# Patient Record
Sex: MALE | Race: WHITE | Employment: OTHER | URBAN - METROPOLITAN AREA
[De-identification: names, ages, dates, MRNs, and addresses within clinical notes are randomized per-mention and may not be internally consistent; named-entity substitution may affect disease eponyms.]

---

## 2021-02-03 ENCOUNTER — HOSPITAL ENCOUNTER (EMERGENCY)
Age: 71
Discharge: HOME | End: 2021-02-03
Payer: MEDICARE

## 2021-02-03 ENCOUNTER — APPOINTMENT (OUTPATIENT)
Dept: GENERAL RADIOLOGY | Age: 71
End: 2021-02-03
Payer: MEDICARE

## 2021-02-03 VITALS
DIASTOLIC BLOOD PRESSURE: 113 MMHG | RESPIRATION RATE: 13 BRPM | SYSTOLIC BLOOD PRESSURE: 179 MMHG | WEIGHT: 190 LBS | OXYGEN SATURATION: 97 % | BODY MASS INDEX: 28.79 KG/M2 | TEMPERATURE: 98.6 F | HEIGHT: 68 IN | HEART RATE: 100 BPM

## 2021-02-03 DIAGNOSIS — R51.9 ACUTE NONINTRACTABLE HEADACHE, UNSPECIFIED HEADACHE TYPE: ICD-10-CM

## 2021-02-03 DIAGNOSIS — M79.10 MYALGIA: ICD-10-CM

## 2021-02-03 DIAGNOSIS — Z20.822 PERSON UNDER INVESTIGATION FOR COVID-19: Primary | ICD-10-CM

## 2021-02-03 LAB
RAPID INFLUENZA  B AGN: NEGATIVE
RAPID INFLUENZA A AGN: NEGATIVE

## 2021-02-03 PROCEDURE — U0002 COVID-19 LAB TEST NON-CDC: HCPCS

## 2021-02-03 PROCEDURE — 6370000000 HC RX 637 (ALT 250 FOR IP): Performed by: PHYSICIAN ASSISTANT

## 2021-02-03 PROCEDURE — 87804 INFLUENZA ASSAY W/OPTIC: CPT

## 2021-02-03 PROCEDURE — 71045 X-RAY EXAM CHEST 1 VIEW: CPT

## 2021-02-03 PROCEDURE — 99283 EMERGENCY DEPT VISIT LOW MDM: CPT

## 2021-02-03 PROCEDURE — U0003 INFECTIOUS AGENT DETECTION BY NUCLEIC ACID (DNA OR RNA); SEVERE ACUTE RESPIRATORY SYNDROME CORONAVIRUS 2 (SARS-COV-2) (CORONAVIRUS DISEASE [COVID-19]), AMPLIFIED PROBE TECHNIQUE, MAKING USE OF HIGH THROUGHPUT TECHNOLOGIES AS DESCRIBED BY CMS-2020-01-R: HCPCS

## 2021-02-03 RX ORDER — ACETAMINOPHEN 500 MG
1000 TABLET ORAL ONCE
Status: COMPLETED | OUTPATIENT
Start: 2021-02-03 | End: 2021-02-03

## 2021-02-03 RX ORDER — LANOLIN ALCOHOL/MO/W.PET/CERES
3 CREAM (GRAM) TOPICAL DAILY
COMMUNITY

## 2021-02-03 RX ORDER — HALOPERIDOL 5 MG
5 TABLET ORAL 2 TIMES DAILY
COMMUNITY

## 2021-02-03 RX ADMIN — ACETAMINOPHEN 1000 MG: 500 TABLET ORAL at 16:50

## 2021-02-03 ASSESSMENT — PAIN SCALES - GENERAL: PAINLEVEL_OUTOF10: 1

## 2021-02-03 ASSESSMENT — ENCOUNTER SYMPTOMS
RESPIRATORY NEGATIVE: 1
DIARRHEA: 0
CHEST TIGHTNESS: 0
NAUSEA: 0
ABDOMINAL PAIN: 0
BACK PAIN: 0
SHORTNESS OF BREATH: 0
VOMITING: 0
COUGH: 0
COLOR CHANGE: 0
CONSTIPATION: 0

## 2021-02-04 ENCOUNTER — CARE COORDINATION (OUTPATIENT)
Dept: CARE COORDINATION | Age: 71
End: 2021-02-04

## 2021-02-04 LAB — SARS-COV-2, PCR: NOT DETECTED

## 2021-02-04 NOTE — CARE COORDINATION
exposure line [phone], local [facility] department [place] [facility]: ([phone]) and PCP office for questions related to their healthcare. CTN/ACM provided contact information for future needs. Reviewed and educated patient on any new and changed medications related to discharge diagnosis     Patient/family/caregiver given information for [employer] and agrees to enroll no    Plan for follow-up call in 5-7 days based on severity of symptoms and risk factors.     COVID Education sent to pt to review via [employer]

## 2021-02-10 ENCOUNTER — CARE COORDINATION (OUTPATIENT)
Dept: CARE COORDINATION | Age: 71
End: 2021-02-10

## 2024-08-31 ENCOUNTER — HOSPITAL ENCOUNTER (INPATIENT)
Age: 74
LOS: 4 days | Discharge: HOME HEALTH | DRG: 698 | End: 2024-09-04
Attending: STUDENT IN AN ORGANIZED HEALTH CARE EDUCATION/TRAINING PROGRAM | Admitting: INTERNAL MEDICINE
Payer: MEDICARE

## 2024-08-31 ENCOUNTER — APPOINTMENT (OUTPATIENT)
Dept: CT IMAGING | Age: 74
DRG: 698 | End: 2024-08-31
Payer: MEDICARE

## 2024-08-31 DIAGNOSIS — N30.01 ACUTE CYSTITIS WITH HEMATURIA: ICD-10-CM

## 2024-08-31 DIAGNOSIS — N32.0 BLADDER OUTLET OBSTRUCTION: ICD-10-CM

## 2024-08-31 DIAGNOSIS — N17.9 ACUTE RENAL FAILURE, UNSPECIFIED ACUTE RENAL FAILURE TYPE (HCC): Primary | ICD-10-CM

## 2024-08-31 DIAGNOSIS — E87.1 HYPONATREMIA: ICD-10-CM

## 2024-08-31 PROBLEM — N39.0 UTI (URINARY TRACT INFECTION): Status: ACTIVE | Noted: 2024-08-31

## 2024-08-31 PROBLEM — R31.9 HEMATURIA: Status: ACTIVE | Noted: 2024-08-31

## 2024-08-31 PROBLEM — A41.9 SEPSIS (HCC): Status: ACTIVE | Noted: 2024-08-31

## 2024-08-31 LAB
ALBUMIN SERPL-MCNC: 3.9 G/DL (ref 3.4–5)
ALBUMIN/GLOB SERPL: 1.1 {RATIO} (ref 1.1–2.2)
ALP SERPL-CCNC: 96 U/L (ref 40–129)
ALT SERPL-CCNC: 17 U/L (ref 10–40)
ANION GAP SERPL CALCULATED.3IONS-SCNC: 17 MMOL/L (ref 3–16)
AST SERPL-CCNC: 16 U/L (ref 15–37)
BACTERIA URNS QL MICRO: ABNORMAL /HPF
BASOPHILS # BLD: 0 K/UL (ref 0–0.2)
BASOPHILS NFR BLD: 0 %
BILIRUB SERPL-MCNC: 1.4 MG/DL (ref 0–1)
BILIRUB UR QL STRIP.AUTO: NEGATIVE
BUN SERPL-MCNC: 70 MG/DL (ref 7–20)
CALCIUM SERPL-MCNC: 8.7 MG/DL (ref 8.3–10.6)
CHLORIDE SERPL-SCNC: 97 MMOL/L (ref 99–110)
CLARITY UR: ABNORMAL
CO2 SERPL-SCNC: 15 MMOL/L (ref 21–32)
COLOR UR: YELLOW
CREAT SERPL-MCNC: 5 MG/DL (ref 0.8–1.3)
DEPRECATED RDW RBC AUTO: 15.7 % (ref 12.4–15.4)
EOSINOPHIL # BLD: 0 K/UL (ref 0–0.6)
EOSINOPHIL NFR BLD: 0 %
EPI CELLS #/AREA URNS AUTO: 0 /HPF (ref 0–5)
GFR SERPLBLD CREATININE-BSD FMLA CKD-EPI: 11 ML/MIN/{1.73_M2}
GLUCOSE SERPL-MCNC: 160 MG/DL (ref 70–99)
GLUCOSE UR STRIP.AUTO-MCNC: NEGATIVE MG/DL
HCT VFR BLD AUTO: 44.6 % (ref 40.5–52.5)
HGB BLD-MCNC: 14.3 G/DL (ref 13.5–17.5)
HGB UR QL STRIP.AUTO: ABNORMAL
HYALINE CASTS #/AREA URNS AUTO: 1 /LPF (ref 0–8)
KETONES UR STRIP.AUTO-MCNC: NEGATIVE MG/DL
LEUKOCYTE ESTERASE UR QL STRIP.AUTO: ABNORMAL
LIPASE SERPL-CCNC: 21 U/L (ref 13–60)
LYMPHOCYTES # BLD: 1.5 K/UL (ref 1–5.1)
LYMPHOCYTES NFR BLD: 8 %
MCH RBC QN AUTO: 27.8 PG (ref 26–34)
MCHC RBC AUTO-ENTMCNC: 32.1 G/DL (ref 31–36)
MCV RBC AUTO: 86.6 FL (ref 80–100)
METAMYELOCYTES NFR BLD MANUAL: 1 %
MONOCYTES # BLD: 1.7 K/UL (ref 0–1.3)
MONOCYTES NFR BLD: 9 %
NEUTROPHILS # BLD: 15.3 K/UL (ref 1.7–7.7)
NEUTROPHILS NFR BLD: 68 %
NEUTS BAND NFR BLD MANUAL: 14 % (ref 0–7)
NITRITE UR QL STRIP.AUTO: POSITIVE
PH UR STRIP.AUTO: 6 [PH] (ref 5–8)
PLATELET # BLD AUTO: 235 K/UL (ref 135–450)
PMV BLD AUTO: 7.3 FL (ref 5–10.5)
POTASSIUM SERPL-SCNC: 4.5 MMOL/L (ref 3.5–5.1)
PROT SERPL-MCNC: 7.6 G/DL (ref 6.4–8.2)
PROT UR STRIP.AUTO-MCNC: 100 MG/DL
RBC # BLD AUTO: 5.15 M/UL (ref 4.2–5.9)
RBC CLUMPS #/AREA URNS AUTO: 108 /HPF (ref 0–4)
SODIUM SERPL-SCNC: 129 MMOL/L (ref 136–145)
SP GR UR STRIP.AUTO: 1.01 (ref 1–1.03)
UA COMPLETE W REFLEX CULTURE PNL UR: YES
UA DIPSTICK W REFLEX MICRO PNL UR: YES
URN SPEC COLLECT METH UR: ABNORMAL
UROBILINOGEN UR STRIP-ACNC: 0.2 E.U./DL
WBC # BLD AUTO: 18.4 K/UL (ref 4–11)
WBC #/AREA URNS AUTO: 2950 /HPF (ref 0–5)

## 2024-08-31 PROCEDURE — 2580000003 HC RX 258: Performed by: PHYSICIAN ASSISTANT

## 2024-08-31 PROCEDURE — 2500000003 HC RX 250 WO HCPCS: Performed by: INTERNAL MEDICINE

## 2024-08-31 PROCEDURE — 81001 URINALYSIS AUTO W/SCOPE: CPT

## 2024-08-31 PROCEDURE — 51702 INSERT TEMP BLADDER CATH: CPT

## 2024-08-31 PROCEDURE — 96361 HYDRATE IV INFUSION ADD-ON: CPT

## 2024-08-31 PROCEDURE — 87086 URINE CULTURE/COLONY COUNT: CPT

## 2024-08-31 PROCEDURE — 80053 COMPREHEN METABOLIC PANEL: CPT

## 2024-08-31 PROCEDURE — 99285 EMERGENCY DEPT VISIT HI MDM: CPT

## 2024-08-31 PROCEDURE — 74176 CT ABD & PELVIS W/O CONTRAST: CPT

## 2024-08-31 PROCEDURE — 96374 THER/PROPH/DIAG INJ IV PUSH: CPT

## 2024-08-31 PROCEDURE — 85025 COMPLETE CBC W/AUTO DIFF WBC: CPT

## 2024-08-31 PROCEDURE — 6360000002 HC RX W HCPCS: Performed by: PHYSICIAN ASSISTANT

## 2024-08-31 PROCEDURE — 6370000000 HC RX 637 (ALT 250 FOR IP): Performed by: UROLOGY

## 2024-08-31 PROCEDURE — 2580000003 HC RX 258: Performed by: INTERNAL MEDICINE

## 2024-08-31 PROCEDURE — 2060000000 HC ICU INTERMEDIATE R&B

## 2024-08-31 PROCEDURE — 96375 TX/PRO/DX INJ NEW DRUG ADDON: CPT

## 2024-08-31 PROCEDURE — 6370000000 HC RX 637 (ALT 250 FOR IP): Performed by: INTERNAL MEDICINE

## 2024-08-31 PROCEDURE — 83690 ASSAY OF LIPASE: CPT

## 2024-08-31 PROCEDURE — 6360000002 HC RX W HCPCS: Performed by: INTERNAL MEDICINE

## 2024-08-31 RX ORDER — 0.9 % SODIUM CHLORIDE 0.9 %
500 INTRAVENOUS SOLUTION INTRAVENOUS PRN
Status: DISCONTINUED | OUTPATIENT
Start: 2024-08-31 | End: 2024-09-04 | Stop reason: HOSPADM

## 2024-08-31 RX ORDER — OLANZAPINE 5 MG/1
5 TABLET ORAL EVERY 8 HOURS PRN
Status: DISCONTINUED | OUTPATIENT
Start: 2024-08-31 | End: 2024-08-31 | Stop reason: SDUPTHER

## 2024-08-31 RX ORDER — OLANZAPINE 5 MG/1
5 TABLET ORAL NIGHTLY
COMMUNITY

## 2024-08-31 RX ORDER — HEPARIN SODIUM 5000 [USP'U]/ML
5000 INJECTION, SOLUTION INTRAVENOUS; SUBCUTANEOUS EVERY 8 HOURS SCHEDULED
Status: DISCONTINUED | OUTPATIENT
Start: 2024-08-31 | End: 2024-09-04 | Stop reason: HOSPADM

## 2024-08-31 RX ORDER — 0.9 % SODIUM CHLORIDE 0.9 %
500 INTRAVENOUS SOLUTION INTRAVENOUS ONCE
Status: COMPLETED | OUTPATIENT
Start: 2024-08-31 | End: 2024-08-31

## 2024-08-31 RX ORDER — OLANZAPINE 5 MG/1
5 TABLET ORAL EVERY 8 HOURS PRN
COMMUNITY

## 2024-08-31 RX ORDER — NIFEDIPINE 30 MG/1
90 TABLET, EXTENDED RELEASE ORAL DAILY
Status: DISCONTINUED | OUTPATIENT
Start: 2024-09-01 | End: 2024-09-04 | Stop reason: HOSPADM

## 2024-08-31 RX ORDER — POTASSIUM CHLORIDE 1500 MG/1
40 TABLET, EXTENDED RELEASE ORAL PRN
Status: DISCONTINUED | OUTPATIENT
Start: 2024-08-31 | End: 2024-08-31

## 2024-08-31 RX ORDER — SODIUM CHLORIDE 9 MG/ML
INJECTION, SOLUTION INTRAVENOUS CONTINUOUS
Status: DISCONTINUED | OUTPATIENT
Start: 2024-08-31 | End: 2024-09-01

## 2024-08-31 RX ORDER — ONDANSETRON 4 MG/1
4 TABLET, ORALLY DISINTEGRATING ORAL EVERY 8 HOURS PRN
Status: DISCONTINUED | OUTPATIENT
Start: 2024-08-31 | End: 2024-09-04 | Stop reason: HOSPADM

## 2024-08-31 RX ORDER — SENNOSIDES A AND B 8.6 MG/1
1 TABLET, FILM COATED ORAL DAILY PRN
Status: DISCONTINUED | OUTPATIENT
Start: 2024-08-31 | End: 2024-09-04 | Stop reason: HOSPADM

## 2024-08-31 RX ORDER — SODIUM BICARBONATE 325 MG/1
1300 TABLET ORAL 2 TIMES DAILY
Status: ON HOLD | COMMUNITY
End: 2024-09-04 | Stop reason: HOSPADM

## 2024-08-31 RX ORDER — CARVEDILOL 6.25 MG/1
6.25 TABLET ORAL 2 TIMES DAILY WITH MEALS
Status: DISCONTINUED | OUTPATIENT
Start: 2024-08-31 | End: 2024-09-04 | Stop reason: HOSPADM

## 2024-08-31 RX ORDER — ACETAMINOPHEN 650 MG/1
650 SUPPOSITORY RECTAL EVERY 6 HOURS PRN
Status: DISCONTINUED | OUTPATIENT
Start: 2024-08-31 | End: 2024-09-04 | Stop reason: HOSPADM

## 2024-08-31 RX ORDER — OLANZAPINE 5 MG/1
10 TABLET ORAL NIGHTLY
Status: DISCONTINUED | OUTPATIENT
Start: 2024-08-31 | End: 2024-09-04 | Stop reason: HOSPADM

## 2024-08-31 RX ORDER — TAMSULOSIN HYDROCHLORIDE 0.4 MG/1
0.4 CAPSULE ORAL DAILY
Status: DISCONTINUED | OUTPATIENT
Start: 2024-08-31 | End: 2024-09-04 | Stop reason: HOSPADM

## 2024-08-31 RX ORDER — HALOPERIDOL 5 MG/1
5 TABLET ORAL 2 TIMES DAILY
Status: DISCONTINUED | OUTPATIENT
Start: 2024-08-31 | End: 2024-08-31

## 2024-08-31 RX ORDER — ONDANSETRON 2 MG/ML
4 INJECTION INTRAMUSCULAR; INTRAVENOUS EVERY 6 HOURS PRN
Status: DISCONTINUED | OUTPATIENT
Start: 2024-08-31 | End: 2024-09-04 | Stop reason: HOSPADM

## 2024-08-31 RX ORDER — SODIUM CHLORIDE 9 MG/ML
INJECTION, SOLUTION INTRAVENOUS PRN
Status: DISCONTINUED | OUTPATIENT
Start: 2024-08-31 | End: 2024-09-04 | Stop reason: HOSPADM

## 2024-08-31 RX ORDER — ONDANSETRON 2 MG/ML
4 INJECTION INTRAMUSCULAR; INTRAVENOUS EVERY 30 MIN PRN
Status: DISCONTINUED | OUTPATIENT
Start: 2024-08-31 | End: 2024-08-31

## 2024-08-31 RX ORDER — VITAMIN B COMPLEX
1000 TABLET ORAL DAILY
Status: DISCONTINUED | OUTPATIENT
Start: 2024-09-01 | End: 2024-09-04 | Stop reason: HOSPADM

## 2024-08-31 RX ORDER — HYDRALAZINE HYDROCHLORIDE 20 MG/ML
10 INJECTION INTRAMUSCULAR; INTRAVENOUS EVERY 6 HOURS PRN
Status: DISCONTINUED | OUTPATIENT
Start: 2024-08-31 | End: 2024-09-04 | Stop reason: HOSPADM

## 2024-08-31 RX ORDER — SODIUM CHLORIDE 0.9 % (FLUSH) 0.9 %
5-40 SYRINGE (ML) INJECTION EVERY 12 HOURS SCHEDULED
Status: DISCONTINUED | OUTPATIENT
Start: 2024-08-31 | End: 2024-09-04 | Stop reason: HOSPADM

## 2024-08-31 RX ORDER — POTASSIUM CHLORIDE 7.45 MG/ML
10 INJECTION INTRAVENOUS PRN
Status: DISCONTINUED | OUTPATIENT
Start: 2024-08-31 | End: 2024-08-31

## 2024-08-31 RX ORDER — OLANZAPINE 5 MG/1
5 TABLET ORAL DAILY
Status: DISCONTINUED | OUTPATIENT
Start: 2024-09-01 | End: 2024-09-04 | Stop reason: HOSPADM

## 2024-08-31 RX ORDER — SODIUM BICARBONATE 650 MG/1
1300 TABLET ORAL 2 TIMES DAILY
Status: DISCONTINUED | OUTPATIENT
Start: 2024-08-31 | End: 2024-09-02

## 2024-08-31 RX ORDER — CARVEDILOL 6.25 MG/1
6.25 TABLET ORAL 2 TIMES DAILY WITH MEALS
COMMUNITY

## 2024-08-31 RX ORDER — MORPHINE SULFATE 4 MG/ML
4 INJECTION, SOLUTION INTRAMUSCULAR; INTRAVENOUS EVERY 30 MIN PRN
Status: DISCONTINUED | OUTPATIENT
Start: 2024-08-31 | End: 2024-08-31 | Stop reason: HOSPADM

## 2024-08-31 RX ORDER — ACETAMINOPHEN 325 MG/1
650 TABLET ORAL EVERY 6 HOURS PRN
Status: DISCONTINUED | OUTPATIENT
Start: 2024-08-31 | End: 2024-09-04 | Stop reason: HOSPADM

## 2024-08-31 RX ORDER — NIFEDIPINE 90 MG/1
90 TABLET, FILM COATED, EXTENDED RELEASE ORAL DAILY
COMMUNITY

## 2024-08-31 RX ORDER — LANOLIN ALCOHOL/MO/W.PET/CERES
3 CREAM (GRAM) TOPICAL NIGHTLY PRN
Status: DISCONTINUED | OUTPATIENT
Start: 2024-08-31 | End: 2024-09-04 | Stop reason: HOSPADM

## 2024-08-31 RX ORDER — SODIUM CHLORIDE 0.9 % (FLUSH) 0.9 %
5-40 SYRINGE (ML) INJECTION PRN
Status: DISCONTINUED | OUTPATIENT
Start: 2024-08-31 | End: 2024-09-04 | Stop reason: HOSPADM

## 2024-08-31 RX ORDER — OLANZAPINE 5 MG/1
5 TABLET ORAL NIGHTLY
Status: DISCONTINUED | OUTPATIENT
Start: 2024-08-31 | End: 2024-08-31 | Stop reason: SDUPTHER

## 2024-08-31 RX ADMIN — SODIUM CHLORIDE, PRESERVATIVE FREE 10 ML: 5 INJECTION INTRAVENOUS at 23:03

## 2024-08-31 RX ADMIN — HEPARIN SODIUM 5000 UNITS: 5000 INJECTION INTRAVENOUS; SUBCUTANEOUS at 22:56

## 2024-08-31 RX ADMIN — ONDANSETRON 4 MG: 2 INJECTION INTRAMUSCULAR; INTRAVENOUS at 11:10

## 2024-08-31 RX ADMIN — TAMSULOSIN HYDROCHLORIDE 0.4 MG: 0.4 CAPSULE ORAL at 22:56

## 2024-08-31 RX ADMIN — HEPARIN SODIUM 5000 UNITS: 5000 INJECTION INTRAVENOUS; SUBCUTANEOUS at 17:50

## 2024-08-31 RX ADMIN — SODIUM BICARBONATE: 84 INJECTION, SOLUTION INTRAVENOUS at 14:46

## 2024-08-31 RX ADMIN — CEFEPIME 2000 MG: 2 INJECTION, POWDER, FOR SOLUTION INTRAVENOUS at 17:55

## 2024-08-31 RX ADMIN — WATER 1000 MG: 1 INJECTION INTRAMUSCULAR; INTRAVENOUS; SUBCUTANEOUS at 12:31

## 2024-08-31 RX ADMIN — SODIUM BICARBONATE 1300 MG: 650 TABLET ORAL at 22:55

## 2024-08-31 RX ADMIN — SODIUM CHLORIDE 500 ML: 9 INJECTION, SOLUTION INTRAVENOUS at 11:01

## 2024-08-31 RX ADMIN — MORPHINE SULFATE 4 MG: 4 INJECTION, SOLUTION INTRAMUSCULAR; INTRAVENOUS at 11:11

## 2024-08-31 RX ADMIN — OLANZAPINE 10 MG: 5 TABLET, FILM COATED ORAL at 22:55

## 2024-08-31 ASSESSMENT — LIFESTYLE VARIABLES
HOW OFTEN DO YOU HAVE A DRINK CONTAINING ALCOHOL: NEVER
HOW MANY STANDARD DRINKS CONTAINING ALCOHOL DO YOU HAVE ON A TYPICAL DAY: PATIENT DOES NOT DRINK

## 2024-08-31 ASSESSMENT — PAIN DESCRIPTION - LOCATION: LOCATION: ABDOMEN

## 2024-08-31 ASSESSMENT — PAIN DESCRIPTION - DESCRIPTORS: DESCRIPTORS: ACHING

## 2024-08-31 ASSESSMENT — PAIN SCALES - GENERAL: PAINLEVEL_OUTOF10: 8

## 2024-08-31 ASSESSMENT — PAIN - FUNCTIONAL ASSESSMENT: PAIN_FUNCTIONAL_ASSESSMENT: NONE - DENIES PAIN

## 2024-08-31 NOTE — ED PROVIDER NOTES
In addition to the advanced practice provider, I personally saw Tye EAGLE Resendez and performed a substantive portion of the visit including all aspects of the medical decision making.    Medical Decision Making    Patient presents from nursing facility.  Had catheter placed 3 days ago draining small amount of blood  Now with RLQ abd pain and constipation.  [facility], [facility] has hx of BPH with outflow obstruction.   Patient uncertain as to why the Guerra catheter was placed.  He does have history of dementia and is a poor historian.  Does not think he has ever needed dialysis before.    On exam pt is resting comfortably  Cardiac RRR, no murmur  Lungs clear bilaterally, no increased work of breathing  Abdomen soft nontender  Guerra catheter is in place.  There appears to be chronic erosive changes to the urethral meatus.  He is not tender over the urethra.  There is thin yellow urine noted in the Guerra output bag.      Is this patient to be included in the SEP-1 Core Measure due to severe sepsis or septic shock?   No     Exclusion criteria - the patient is NOT to be included for SEP-1 Core Measure due to:  Alternative explanation for abnormal labs/vitals that do not relate to sepsis, see MDM for further explanation  Respiratory rate elevation likely secondary to pain from misplaced Guerra catheter.  Leukocytosis likely secondary to pain and stress response from Guerra catheter balloon sitting in his prostatic urethra    Screenings     [place] Coma Scale  Eye Opening: Spontaneous  Best Verbal Response: Oriented  Best Motor Response: Obeys commands  Mary Coma Scale Score: 15     CT ABDOMEN PELVIS WO CONTRAST Additional Contrast? None   Final Result   1. Misplaced Guerra catheter with the balloon in the prostatic urethra.  There   is also severe bladder distension consistent with bladder outlet obstruction   and a small amount of dependent increased attenuation within the bladder of   uncertain etiology.  Urinalysis 
DIFFERENTIAL - Abnormal; Notable for the following components:       Result Value    WBC 18.4 (*)     RDW 15.7 (*)     Neutrophils Absolute 15.3 (*)     Monocytes Absolute 1.7 (*)     Bands Relative 14 (*)     Metamyelocytes Relative 1 (*)     All other components within normal limits   COMPREHENSIVE METABOLIC PANEL W/ REFLEX TO MG FOR LOW K - Abnormal; Notable for the following components:    Sodium 129 (*)     Chloride 97 (*)     CO2 15 (*)     Anion Gap 17 (*)     Glucose 160 (*)     BUN 70 (*)     Creatinine 5.0 (*)     Est, Glom Filt Rate 11 (*)     Total Bilirubin 1.4 (*)     All other components within normal limits   URINALYSIS WITH REFLEX TO CULTURE - Abnormal; Notable for the following components:    Clarity, UA TURBID (*)     Blood, Urine LARGE (*)     Protein,  (*)     Nitrite, Urine POSITIVE (*)     Leukocyte Esterase, Urine LARGE (*)     All other components within normal limits   MICROSCOPIC URINALYSIS - Abnormal; Notable for the following components:    Bacteria, UA 4+ (*)     WBC, UA 2950 (*)     RBC,  (*)     All other components within normal limits   CULTURE, URINE   LIPASE   BLOOD GAS, VENOUS   URINALYSIS       When ordered only abnormal lab results are displayed. All other labs were within normal range or not returned as of this dictation.    EKG: When ordered, EKG's are interpreted by the Emergency Department Physician in the absence of a cardiologist.  Please see their note for interpretation of EKG.    RADIOLOGY:   Non-plain film images such as CT, Ultrasound and MRI are read by the radiologist. Plain radiographic images are visualized and preliminarily interpreted by the ED Provider with the below findings:        Interpretation per the Radiologist below, if available at the time of this note:    CT ABDOMEN PELVIS WO CONTRAST Additional Contrast? None   Final Result   1. Misplaced Guerra catheter with the balloon in the prostatic urethra.  There   is also severe bladder

## 2024-08-31 NOTE — ED NOTES
How does patient ambulate?   []Low Fall Risk (ambulates by themselves without support)  [x]Stand by assist   []Contact Guard   []Front wheel walker  []Wheelchair   []Steady  []Bed bound  []History of Lower Extremity Amputation  []Unknown, did not assess in the emergency department   How does patient take pills?  [x]Whole with Water  []Crushed in applesauce  []Crushed in pudding  []Other  []Unknown no oral medications were given in the ED  Is patient alert?   [x]Alert  []Drowsy but responds to voice  []Doesn't respond to voice but responds to painful stimuli  []Unresponsive  Is patient oriented?   [x]To person  [x]To place  [x]To time  [x]To situation  []Confused  []Agitated  [x]Follows commands  If patient is disoriented or from a Skill Nursing Facility has family been notified of admission?   []Yes   []No  Patient belongings?   [x]Cell phone  [x]Wallet   []Dentures  [x]Clothing  Any specific patient or family belongings/needs/dynamics?     Miscellaneous comments/pending orders?  A&O, AMBULATORY, ABLE TO MAKE NEEDS KNOWN     If there are any additional questions please reach out to the Emergency Department.    [id number]

## 2024-08-31 NOTE — H&P
History and Physical  Dr. Diehl  8/31/2024    PCP: Chris Persaud MD    Cc:   Chief Complaint   Patient presents with    Constipation    Urinary Catheter     From [facility] assisted living via [facility] cc constipation (last BM 3 days ago) and problems with urinary catheter x 3 days (blood in catheter bag and decreased urinary output). Pt states attempted to treat constipation with miralax x 2 days, but without improvement. Denies pain.       HPI:  Tye Resendez is a 73 y.o. male who has no past medical history on file.     Patient presents with DIMA (acute kidney injury) (HCC).  HPI  (1-3 for expanded problem focused, ?4 for detailed/comprehensive)       73 y.o. male who presents to the emergency department with a chief complaint of some right lower quadrant abdominal pain, constipation, catheter not draining, distention, decreased appetite.  He states this began 3 days ago.  A small hard bowel movement 3 days ago has not had any bowel movement since then.  He states [facility] and he states it is only been draining blood.  He states he is not even had a full bag of urine drainage into his leg bag since this was placed.  He states he believes it was placed due to kidney failure.  He denies fevers.     ER discovers patient has misplaced Guerra with balloon in the prostatic urethra with severe bladder distention consistent with bladder outlet obstruction. new Guerra catheter was placed and had over 1300 cc drained.     Has leukocytosis of 18,000 with bandemia, hyponatremia of 129. Creatinine is 5.0 and BUN is 70. Started on Rocephin.     Urology and renal consulted to see    Problem list of hospitalization thus far:  Active Hospital Problems    Diagnosis     DIMA (acute kidney injury) (HCC) [N17.9]     UTI (urinary tract infection) [N39.0]     Hematuria [R31.9]     Hyponatremia [E87.1]     Sepsis (HCC) [A41.9]          Review of Systems: (1 system for EPF, 2-9 for detailed,

## 2024-08-31 NOTE — CONSULTS
[facility] Consult Note  [facility]    Provider: Shadia Abrams MD Patient ID:  Admission Date: 2024 Name: Tye Resendez  OR Date: n/a MRN: [id number]   Patient Location: [facility]-3368/[id number] : 1950  Attending: Abhijeet Diehl MD Date of Service: 2024  PCP: Chris Persaud MD     Diagnoses:  1. Acute renal failure, unspecified acute renal failure type (HCC)    2. Bladder outlet obstruction    3. Acute cystitis with hematuria    4. Hyponatremia        Assessment/Plan:  Malpositioned Kinney    73 year old w BPH and malpositioned kinney causing hematuria, poor drainage. Kinney now repositioned.     Leave kinney. Plan outpatient followup for voiding trial. Hematuria apparently noted when balloon in urethra but has now cleared. Anticipate improvement in Cr now that kinney is draining. Urine culture pending. Would treat UTI as indicated. Flomax started.     Will sign off. Outpatient followup requested. Please call with concerns.     All the patients questions were answered in detail. He understands the plan as listed above.                                                                                                                                                      CC:   Chief Complaint   Patient presents with    Constipation    Urinary Catheter     From [facility] assisted living via [facility] cc constipation (last BM 3 days ago) and problems with urinary catheter x 3 days (blood in catheter bag and decreased urinary output). Pt states attempted to treat constipation with miralax x 2 days, but without improvement. Denies pain.       HPI: Tye Resendez is a 73 y.o. male with urinary retention. [facility], not in [facility]. Came to ER with pain, distension. CT showed balloon in urethra, kinney since repositioned with >1L output. Pt now no complaints. Limited historian. Urine yellow.     Past medical History:   He has a past medical history of BPH with urinary

## 2024-08-31 NOTE — CONSULTS
[facility]  Consultation Note    Reason for Consult: DIMA on CKD 4, hyponatremia, low serum bicarbonate  Requesting Physician:  Dr. TED Diehl    CHIEF COMPLAINT: Abdominal distention and poor urine output through Guerra catheter    History obtained from records and patient.    HISTORY OF PRESENT ILLNESS:                Tye Resendez  is 73 y.o. y.o. male with significant past medical history of CKD 4, creatinine 2022 was around 3, [facility], [facility], Guerra catheter placed around 3 days ago, who presents with abdominal distention and decreased urine output in the Guerra catheter.  On presentation to the ED BUN was up to 70 with a creatinine of 5.  Sodium was 129 with serum bicarbonate of 15.  Anion gap of 17.  Lowest SBP in the 130s range.  Denies any diarrhea.  One episode of vomiting.  No regular NSAID use.  CT showed misplaced Guerra catheter with balloon in the prostatic urethra.  Severe bladder distention.  Severe bilateral hydronephrosis.  Guerra catheter was replaced with 1.3 L of urine output.  Denies any shortness of breath.      Past Medical History:     has no past medical history on file.   Past Surgical History:     has no past surgical history on file.   Current Medications:    Current Facility-Administered Medications: morphine injection 4 mg, 4 mg, IntraVENous, Q30 Min PRN  ondansetron (ZOFRAN) injection 4 mg, 4 mg, IntraVENous, Q30 Min PRN  Allergies:    Patient has no known allergies.   Social History:     reports that he has quit smoking. He has never used smokeless tobacco. He reports that he does not currently use alcohol. He reports that he does not use drugs.   Family History:   family history is not on file.     REVIEW OF SYSTEMS:    System review was done , pertinent positives are mentioned in the HPI    Positive fatigue  No chest pain nor palpitations  No SOB, coughing nor hemoptysis  No abdominal pain, nausea, vomiting nor diarrhea.  Has abdominal

## 2024-09-01 LAB
ANION GAP SERPL CALCULATED.3IONS-SCNC: 17 MMOL/L (ref 3–16)
BACTERIA UR CULT: NORMAL
BASE EXCESS BLDV CALC-SCNC: 1.4 MMOL/L (ref -3–3)
BASOPHILS # BLD: 0 K/UL (ref 0–0.2)
BASOPHILS NFR BLD: 0.3 %
BUN SERPL-MCNC: 71 MG/DL (ref 7–20)
CALCIUM SERPL-MCNC: 8 MG/DL (ref 8.3–10.6)
CHLORIDE SERPL-SCNC: 99 MMOL/L (ref 99–110)
CO2 BLDV-SCNC: 57 MMOL/L
CO2 SERPL-SCNC: 19 MMOL/L (ref 21–32)
COHGB MFR BLDV: 3.5 % (ref 0–1.5)
CREAT SERPL-MCNC: 4.7 MG/DL (ref 0.8–1.3)
DEPRECATED RDW RBC AUTO: 15.6 % (ref 12.4–15.4)
EOSINOPHIL # BLD: 0 K/UL (ref 0–0.6)
EOSINOPHIL NFR BLD: 0.4 %
GFR SERPLBLD CREATININE-BSD FMLA CKD-EPI: 12 ML/MIN/{1.73_M2}
GLUCOSE SERPL-MCNC: 120 MG/DL (ref 70–99)
HCO3 BLDV-SCNC: 24.4 MMOL/L (ref 23–29)
HCT VFR BLD AUTO: 38.1 % (ref 40.5–52.5)
HGB BLD-MCNC: 13 G/DL (ref 13.5–17.5)
LACTATE BLDV-SCNC: 0.7 MMOL/L (ref 0.4–2)
LYMPHOCYTES # BLD: 1 K/UL (ref 1–5.1)
LYMPHOCYTES NFR BLD: 10 %
MCH RBC QN AUTO: 29.5 PG (ref 26–34)
MCHC RBC AUTO-ENTMCNC: 34.1 G/DL (ref 31–36)
MCV RBC AUTO: 86.5 FL (ref 80–100)
METHGB MFR BLDV: 0.4 %
MONOCYTES # BLD: 1.1 K/UL (ref 0–1.3)
MONOCYTES NFR BLD: 11.6 %
NEUTROPHILS # BLD: 7.6 K/UL (ref 1.7–7.7)
NEUTROPHILS NFR BLD: 77.7 %
O2 CT VFR BLDV CALC: 19 VOL %
O2 THERAPY: ABNORMAL
PCO2 BLDV: 33.1 MMHG (ref 40–50)
PH BLDV: 7.48 [PH] (ref 7.35–7.45)
PLATELET # BLD AUTO: 192 K/UL (ref 135–450)
PMV BLD AUTO: 7 FL (ref 5–10.5)
PO2 BLDV: 151 MMHG (ref 25–40)
POTASSIUM SERPL-SCNC: 3.8 MMOL/L (ref 3.5–5.1)
PROCALCITONIN SERPL IA-MCNC: 1.12 NG/ML (ref 0–0.15)
RBC # BLD AUTO: 4.41 M/UL (ref 4.2–5.9)
SAO2 % BLDV: 100 %
SODIUM SERPL-SCNC: 135 MMOL/L (ref 136–145)
WBC # BLD AUTO: 9.7 K/UL (ref 4–11)

## 2024-09-01 PROCEDURE — 84133 ASSAY OF URINE POTASSIUM: CPT

## 2024-09-01 PROCEDURE — 82803 BLOOD GASES ANY COMBINATION: CPT

## 2024-09-01 PROCEDURE — 84145 PROCALCITONIN (PCT): CPT

## 2024-09-01 PROCEDURE — 6370000000 HC RX 637 (ALT 250 FOR IP): Performed by: INTERNAL MEDICINE

## 2024-09-01 PROCEDURE — 80048 BASIC METABOLIC PNL TOTAL CA: CPT

## 2024-09-01 PROCEDURE — 2060000000 HC ICU INTERMEDIATE R&B

## 2024-09-01 PROCEDURE — 83605 ASSAY OF LACTIC ACID: CPT

## 2024-09-01 PROCEDURE — 2580000003 HC RX 258: Performed by: INTERNAL MEDICINE

## 2024-09-01 PROCEDURE — 2500000003 HC RX 250 WO HCPCS: Performed by: INTERNAL MEDICINE

## 2024-09-01 PROCEDURE — 84300 ASSAY OF URINE SODIUM: CPT

## 2024-09-01 PROCEDURE — 6360000002 HC RX W HCPCS: Performed by: INTERNAL MEDICINE

## 2024-09-01 PROCEDURE — 85025 COMPLETE CBC W/AUTO DIFF WBC: CPT

## 2024-09-01 PROCEDURE — 83935 ASSAY OF URINE OSMOLALITY: CPT

## 2024-09-01 PROCEDURE — 36415 COLL VENOUS BLD VENIPUNCTURE: CPT

## 2024-09-01 PROCEDURE — 6370000000 HC RX 637 (ALT 250 FOR IP): Performed by: UROLOGY

## 2024-09-01 RX ORDER — POTASSIUM CHLORIDE 1500 MG/1
20 TABLET, EXTENDED RELEASE ORAL ONCE
Status: COMPLETED | OUTPATIENT
Start: 2024-09-01 | End: 2024-09-01

## 2024-09-01 RX ORDER — HYDROCODONE BITARTRATE AND ACETAMINOPHEN 7.5; 325 MG/1; MG/1
1 TABLET ORAL EVERY 6 HOURS PRN
Status: DISCONTINUED | OUTPATIENT
Start: 2024-09-01 | End: 2024-09-04 | Stop reason: HOSPADM

## 2024-09-01 RX ORDER — SODIUM CHLORIDE, SODIUM LACTATE, POTASSIUM CHLORIDE, CALCIUM CHLORIDE 600; 310; 30; 20 MG/100ML; MG/100ML; MG/100ML; MG/100ML
INJECTION, SOLUTION INTRAVENOUS CONTINUOUS
Status: ACTIVE | OUTPATIENT
Start: 2024-09-01 | End: 2024-09-02

## 2024-09-01 RX ADMIN — SODIUM CHLORIDE, POTASSIUM CHLORIDE, SODIUM LACTATE AND CALCIUM CHLORIDE: 600; 310; 30; 20 INJECTION, SOLUTION INTRAVENOUS at 16:08

## 2024-09-01 RX ADMIN — POTASSIUM CHLORIDE 20 MEQ: 1500 TABLET, EXTENDED RELEASE ORAL at 10:43

## 2024-09-01 RX ADMIN — BENZOCAINE AND MENTHOL 1 LOZENGE: 15; 3.6 LOZENGE ORAL at 20:32

## 2024-09-01 RX ADMIN — SODIUM CHLORIDE, POTASSIUM CHLORIDE, SODIUM LACTATE AND CALCIUM CHLORIDE: 600; 310; 30; 20 INJECTION, SOLUTION INTRAVENOUS at 22:08

## 2024-09-01 RX ADMIN — CARVEDILOL 6.25 MG: 6.25 TABLET, FILM COATED ORAL at 16:02

## 2024-09-01 RX ADMIN — HEPARIN SODIUM 5000 UNITS: 5000 INJECTION INTRAVENOUS; SUBCUTANEOUS at 16:02

## 2024-09-01 RX ADMIN — SODIUM BICARBONATE: 84 INJECTION, SOLUTION INTRAVENOUS at 06:53

## 2024-09-01 RX ADMIN — HEPARIN SODIUM 5000 UNITS: 5000 INJECTION INTRAVENOUS; SUBCUTANEOUS at 05:51

## 2024-09-01 RX ADMIN — HEPARIN SODIUM 5000 UNITS: 5000 INJECTION INTRAVENOUS; SUBCUTANEOUS at 20:33

## 2024-09-01 RX ADMIN — BENZOCAINE AND MENTHOL 1 LOZENGE: 15; 3.6 LOZENGE ORAL at 05:50

## 2024-09-01 RX ADMIN — SODIUM BICARBONATE 1300 MG: 650 TABLET ORAL at 08:10

## 2024-09-01 RX ADMIN — TAMSULOSIN HYDROCHLORIDE 0.4 MG: 0.4 CAPSULE ORAL at 08:10

## 2024-09-01 RX ADMIN — SODIUM CHLORIDE, PRESERVATIVE FREE 10 ML: 5 INJECTION INTRAVENOUS at 08:11

## 2024-09-01 RX ADMIN — CEFEPIME 1000 MG: 1 INJECTION, POWDER, FOR SOLUTION INTRAMUSCULAR; INTRAVENOUS at 16:09

## 2024-09-01 RX ADMIN — OLANZAPINE 5 MG: 5 TABLET, FILM COATED ORAL at 08:10

## 2024-09-01 RX ADMIN — Medication 1000 UNITS: at 08:11

## 2024-09-01 RX ADMIN — CARVEDILOL 6.25 MG: 6.25 TABLET, FILM COATED ORAL at 08:11

## 2024-09-01 RX ADMIN — NIFEDIPINE 90 MG: 30 TABLET, FILM COATED, EXTENDED RELEASE ORAL at 08:10

## 2024-09-01 RX ADMIN — SODIUM BICARBONATE 1300 MG: 650 TABLET ORAL at 20:32

## 2024-09-01 RX ADMIN — OLANZAPINE 10 MG: 5 TABLET, FILM COATED ORAL at 20:32

## 2024-09-01 RX ADMIN — SODIUM CHLORIDE, PRESERVATIVE FREE 10 ML: 5 INJECTION INTRAVENOUS at 20:33

## 2024-09-02 LAB
ANION GAP SERPL CALCULATED.3IONS-SCNC: 14 MMOL/L (ref 3–16)
BASOPHILS # BLD: 0 K/UL (ref 0–0.2)
BASOPHILS NFR BLD: 0.4 %
BUN SERPL-MCNC: 66 MG/DL (ref 7–20)
CALCIUM SERPL-MCNC: 8.2 MG/DL (ref 8.3–10.6)
CHLORIDE SERPL-SCNC: 103 MMOL/L (ref 99–110)
CO2 SERPL-SCNC: 22 MMOL/L (ref 21–32)
CREAT SERPL-MCNC: 4.2 MG/DL (ref 0.8–1.3)
DEPRECATED RDW RBC AUTO: 15.6 % (ref 12.4–15.4)
EOSINOPHIL # BLD: 0.1 K/UL (ref 0–0.6)
EOSINOPHIL NFR BLD: 1.7 %
GFR SERPLBLD CREATININE-BSD FMLA CKD-EPI: 14 ML/MIN/{1.73_M2}
GLUCOSE SERPL-MCNC: 110 MG/DL (ref 70–99)
HCT VFR BLD AUTO: 40.8 % (ref 40.5–52.5)
HGB BLD-MCNC: 13.3 G/DL (ref 13.5–17.5)
LYMPHOCYTES # BLD: 1.5 K/UL (ref 1–5.1)
LYMPHOCYTES NFR BLD: 19.7 %
MCH RBC QN AUTO: 28.6 PG (ref 26–34)
MCHC RBC AUTO-ENTMCNC: 32.6 G/DL (ref 31–36)
MCV RBC AUTO: 87.7 FL (ref 80–100)
MONOCYTES # BLD: 1 K/UL (ref 0–1.3)
MONOCYTES NFR BLD: 12.6 %
NEUTROPHILS # BLD: 5 K/UL (ref 1.7–7.7)
NEUTROPHILS NFR BLD: 65.6 %
OSMOLALITY UR: 334 MOSM/KG (ref 390–1070)
PLATELET # BLD AUTO: 216 K/UL (ref 135–450)
PMV BLD AUTO: 7.6 FL (ref 5–10.5)
POTASSIUM SERPL-SCNC: 3.7 MMOL/L (ref 3.5–5.1)
POTASSIUM UR-SCNC: 38.2 MMOL/L
PROCALCITONIN SERPL IA-MCNC: 0.88 NG/ML (ref 0–0.15)
RBC # BLD AUTO: 4.65 M/UL (ref 4.2–5.9)
SODIUM SERPL-SCNC: 139 MMOL/L (ref 136–145)
SODIUM UR-SCNC: 58 MMOL/L
WBC # BLD AUTO: 7.6 K/UL (ref 4–11)

## 2024-09-02 PROCEDURE — 6370000000 HC RX 637 (ALT 250 FOR IP): Performed by: INTERNAL MEDICINE

## 2024-09-02 PROCEDURE — 80048 BASIC METABOLIC PNL TOTAL CA: CPT

## 2024-09-02 PROCEDURE — 6370000000 HC RX 637 (ALT 250 FOR IP): Performed by: UROLOGY

## 2024-09-02 PROCEDURE — 6360000002 HC RX W HCPCS: Performed by: INTERNAL MEDICINE

## 2024-09-02 PROCEDURE — 2580000003 HC RX 258: Performed by: INTERNAL MEDICINE

## 2024-09-02 PROCEDURE — 85025 COMPLETE CBC W/AUTO DIFF WBC: CPT

## 2024-09-02 PROCEDURE — 2060000000 HC ICU INTERMEDIATE R&B

## 2024-09-02 PROCEDURE — 84145 PROCALCITONIN (PCT): CPT

## 2024-09-02 PROCEDURE — 36415 COLL VENOUS BLD VENIPUNCTURE: CPT

## 2024-09-02 RX ORDER — SODIUM CHLORIDE, SODIUM LACTATE, POTASSIUM CHLORIDE, CALCIUM CHLORIDE 600; 310; 30; 20 MG/100ML; MG/100ML; MG/100ML; MG/100ML
INJECTION, SOLUTION INTRAVENOUS CONTINUOUS
Status: DISCONTINUED | OUTPATIENT
Start: 2024-09-02 | End: 2024-09-03

## 2024-09-02 RX ADMIN — CARVEDILOL 6.25 MG: 6.25 TABLET, FILM COATED ORAL at 08:34

## 2024-09-02 RX ADMIN — HEPARIN SODIUM 5000 UNITS: 5000 INJECTION INTRAVENOUS; SUBCUTANEOUS at 05:26

## 2024-09-02 RX ADMIN — SODIUM CHLORIDE, PRESERVATIVE FREE 10 ML: 5 INJECTION INTRAVENOUS at 08:34

## 2024-09-02 RX ADMIN — OLANZAPINE 5 MG: 5 TABLET, FILM COATED ORAL at 08:34

## 2024-09-02 RX ADMIN — HEPARIN SODIUM 5000 UNITS: 5000 INJECTION INTRAVENOUS; SUBCUTANEOUS at 15:49

## 2024-09-02 RX ADMIN — SODIUM BICARBONATE 1300 MG: 650 TABLET ORAL at 08:34

## 2024-09-02 RX ADMIN — CEFEPIME 1000 MG: 1 INJECTION, POWDER, FOR SOLUTION INTRAMUSCULAR; INTRAVENOUS at 17:14

## 2024-09-02 RX ADMIN — CARVEDILOL 6.25 MG: 6.25 TABLET, FILM COATED ORAL at 15:49

## 2024-09-02 RX ADMIN — Medication 1000 UNITS: at 08:34

## 2024-09-02 RX ADMIN — TAMSULOSIN HYDROCHLORIDE 0.4 MG: 0.4 CAPSULE ORAL at 08:34

## 2024-09-02 RX ADMIN — OLANZAPINE 10 MG: 5 TABLET, FILM COATED ORAL at 20:25

## 2024-09-02 RX ADMIN — SODIUM CHLORIDE, POTASSIUM CHLORIDE, SODIUM LACTATE AND CALCIUM CHLORIDE: 600; 310; 30; 20 INJECTION, SOLUTION INTRAVENOUS at 17:13

## 2024-09-02 RX ADMIN — NIFEDIPINE 90 MG: 30 TABLET, FILM COATED, EXTENDED RELEASE ORAL at 08:34

## 2024-09-02 RX ADMIN — HEPARIN SODIUM 5000 UNITS: 5000 INJECTION INTRAVENOUS; SUBCUTANEOUS at 20:28

## 2024-09-02 NOTE — PLAN OF CARE
Problem: Discharge Planning  Goal: Discharge to home or other facility with appropriate resources  Outcome: Progressing  Flowsheets (Taken 9/1/2024 2037)  Discharge to home or other facility with appropriate resources:   Identify barriers to discharge with patient and caregiver   Arrange for needed discharge resources and transportation as appropriate   Identify discharge learning needs (meds, wound care, etc)   Refer to discharge planning if patient needs post-hospital services based on physician order or complex needs related to functional status, cognitive ability or social support system     Problem: Safety - Adult  Goal: Free from fall injury  9/2/2024 0057 by Aaliyah Jesus, RN  Outcome: Progressing  9/2/2024 0054 by Aaliyah Jesus, RN  Flowsheets (Taken 9/2/2024 0054)  Free From Fall Injury: Instruct family/caregiver on patient safety  Note: Pt up with assistance. Call out appropriately.

## 2024-09-03 LAB
ANION GAP SERPL CALCULATED.3IONS-SCNC: 14 MMOL/L (ref 3–16)
BASOPHILS # BLD: 0.1 K/UL (ref 0–0.2)
BASOPHILS NFR BLD: 0.8 %
BUN SERPL-MCNC: 52 MG/DL (ref 7–20)
CALCIUM SERPL-MCNC: 9 MG/DL (ref 8.3–10.6)
CHLORIDE SERPL-SCNC: 106 MMOL/L (ref 99–110)
CO2 SERPL-SCNC: 21 MMOL/L (ref 21–32)
CREAT SERPL-MCNC: 3.3 MG/DL (ref 0.8–1.3)
DEPRECATED RDW RBC AUTO: 15.6 % (ref 12.4–15.4)
EOSINOPHIL # BLD: 0.2 K/UL (ref 0–0.6)
EOSINOPHIL NFR BLD: 3.1 %
GFR SERPLBLD CREATININE-BSD FMLA CKD-EPI: 19 ML/MIN/{1.73_M2}
GLUCOSE SERPL-MCNC: 113 MG/DL (ref 70–99)
HCT VFR BLD AUTO: 43.1 % (ref 40.5–52.5)
HGB BLD-MCNC: 13.8 G/DL (ref 13.5–17.5)
LYMPHOCYTES # BLD: 1.7 K/UL (ref 1–5.1)
LYMPHOCYTES NFR BLD: 25.3 %
MCH RBC QN AUTO: 28.2 PG (ref 26–34)
MCHC RBC AUTO-ENTMCNC: 32.2 G/DL (ref 31–36)
MCV RBC AUTO: 87.8 FL (ref 80–100)
MONOCYTES # BLD: 0.9 K/UL (ref 0–1.3)
MONOCYTES NFR BLD: 12.6 %
NEUTROPHILS # BLD: 4 K/UL (ref 1.7–7.7)
NEUTROPHILS NFR BLD: 58.2 %
PLATELET # BLD AUTO: 257 K/UL (ref 135–450)
PMV BLD AUTO: 7.4 FL (ref 5–10.5)
POTASSIUM SERPL-SCNC: 4.6 MMOL/L (ref 3.5–5.1)
RBC # BLD AUTO: 4.91 M/UL (ref 4.2–5.9)
SODIUM SERPL-SCNC: 141 MMOL/L (ref 136–145)
WBC # BLD AUTO: 6.8 K/UL (ref 4–11)

## 2024-09-03 PROCEDURE — 2580000003 HC RX 258: Performed by: INTERNAL MEDICINE

## 2024-09-03 PROCEDURE — 6360000002 HC RX W HCPCS: Performed by: INTERNAL MEDICINE

## 2024-09-03 PROCEDURE — 2060000000 HC ICU INTERMEDIATE R&B

## 2024-09-03 PROCEDURE — 97165 OT EVAL LOW COMPLEX 30 MIN: CPT

## 2024-09-03 PROCEDURE — 85025 COMPLETE CBC W/AUTO DIFF WBC: CPT

## 2024-09-03 PROCEDURE — 6370000000 HC RX 637 (ALT 250 FOR IP): Performed by: INTERNAL MEDICINE

## 2024-09-03 PROCEDURE — 6370000000 HC RX 637 (ALT 250 FOR IP): Performed by: UROLOGY

## 2024-09-03 PROCEDURE — 97161 PT EVAL LOW COMPLEX 20 MIN: CPT

## 2024-09-03 PROCEDURE — 36415 COLL VENOUS BLD VENIPUNCTURE: CPT

## 2024-09-03 PROCEDURE — 97530 THERAPEUTIC ACTIVITIES: CPT

## 2024-09-03 PROCEDURE — 97116 GAIT TRAINING THERAPY: CPT

## 2024-09-03 PROCEDURE — 80048 BASIC METABOLIC PNL TOTAL CA: CPT

## 2024-09-03 RX ADMIN — Medication 1000 UNITS: at 07:46

## 2024-09-03 RX ADMIN — HEPARIN SODIUM 5000 UNITS: 5000 INJECTION INTRAVENOUS; SUBCUTANEOUS at 20:30

## 2024-09-03 RX ADMIN — SODIUM CHLORIDE, PRESERVATIVE FREE 10 ML: 5 INJECTION INTRAVENOUS at 07:46

## 2024-09-03 RX ADMIN — HEPARIN SODIUM 5000 UNITS: 5000 INJECTION INTRAVENOUS; SUBCUTANEOUS at 06:24

## 2024-09-03 RX ADMIN — NIFEDIPINE 90 MG: 30 TABLET, FILM COATED, EXTENDED RELEASE ORAL at 07:46

## 2024-09-03 RX ADMIN — HEPARIN SODIUM 5000 UNITS: 5000 INJECTION INTRAVENOUS; SUBCUTANEOUS at 14:06

## 2024-09-03 RX ADMIN — OLANZAPINE 10 MG: 5 TABLET, FILM COATED ORAL at 20:30

## 2024-09-03 RX ADMIN — TAMSULOSIN HYDROCHLORIDE 0.4 MG: 0.4 CAPSULE ORAL at 07:47

## 2024-09-03 RX ADMIN — OLANZAPINE 5 MG: 5 TABLET, FILM COATED ORAL at 07:46

## 2024-09-03 RX ADMIN — CARVEDILOL 6.25 MG: 6.25 TABLET, FILM COATED ORAL at 07:46

## 2024-09-03 RX ADMIN — CEFEPIME 1000 MG: 1 INJECTION, POWDER, FOR SOLUTION INTRAMUSCULAR; INTRAVENOUS at 16:29

## 2024-09-03 RX ADMIN — CARVEDILOL 6.25 MG: 6.25 TABLET, FILM COATED ORAL at 16:25

## 2024-09-03 NOTE — PLAN OF CARE
Problem: Discharge Planning  Goal: Discharge to home or other facility with appropriate resources  9/3/2024 1705 by Angelique Jaramillo, RN  Outcome: Progressing     Problem: Safety - Adult  Goal: Free from fall injury  9/3/2024 1705 by Angelique Jaramillo, RN  Outcome: Progressing

## 2024-09-03 NOTE — PLAN OF CARE
Problem: Discharge Planning  Goal: Discharge to home or other facility with appropriate resources  9/3/2024 0839 by Crystal Nicholson, RN  Outcome: Progressing  Flowsheets (Taken 9/3/2024 0747)  Discharge to home or other facility with appropriate resources:   Identify barriers to discharge with patient and caregiver   Arrange for needed discharge resources and transportation as appropriate     Problem: Safety - Adult  Goal: Free from fall injury  9/3/2024 0839 by Crystal Nicholson, RN  Outcome: Progressing  Flowsheets (Taken 9/3/2024 0838)  Free From Fall Injury: Instruct family/caregiver on patient safety

## 2024-09-04 VITALS
OXYGEN SATURATION: 99 % | DIASTOLIC BLOOD PRESSURE: 86 MMHG | TEMPERATURE: 97.3 F | WEIGHT: 222.4 LBS | RESPIRATION RATE: 16 BRPM | BODY MASS INDEX: 33.71 KG/M2 | SYSTOLIC BLOOD PRESSURE: 135 MMHG | HEIGHT: 68 IN | HEART RATE: 82 BPM

## 2024-09-04 LAB
ANION GAP SERPL CALCULATED.3IONS-SCNC: 12 MMOL/L (ref 3–16)
BASOPHILS # BLD: 0 K/UL (ref 0–0.2)
BASOPHILS NFR BLD: 0.5 %
BUN SERPL-MCNC: 39 MG/DL (ref 7–20)
CALCIUM SERPL-MCNC: 9 MG/DL (ref 8.3–10.6)
CHLORIDE SERPL-SCNC: 110 MMOL/L (ref 99–110)
CO2 SERPL-SCNC: 20 MMOL/L (ref 21–32)
CREAT SERPL-MCNC: 2.7 MG/DL (ref 0.8–1.3)
DEPRECATED RDW RBC AUTO: 15.2 % (ref 12.4–15.4)
EOSINOPHIL # BLD: 0.2 K/UL (ref 0–0.6)
EOSINOPHIL NFR BLD: 2.9 %
GFR SERPLBLD CREATININE-BSD FMLA CKD-EPI: 24 ML/MIN/{1.73_M2}
GLUCOSE SERPL-MCNC: 97 MG/DL (ref 70–99)
HCT VFR BLD AUTO: 43.5 % (ref 40.5–52.5)
HGB BLD-MCNC: 14.1 G/DL (ref 13.5–17.5)
LYMPHOCYTES # BLD: 1.7 K/UL (ref 1–5.1)
LYMPHOCYTES NFR BLD: 23.7 %
MCH RBC QN AUTO: 28.5 PG (ref 26–34)
MCHC RBC AUTO-ENTMCNC: 32.5 G/DL (ref 31–36)
MCV RBC AUTO: 87.7 FL (ref 80–100)
MONOCYTES # BLD: 0.8 K/UL (ref 0–1.3)
MONOCYTES NFR BLD: 11.6 %
NEUTROPHILS # BLD: 4.4 K/UL (ref 1.7–7.7)
NEUTROPHILS NFR BLD: 61.3 %
PLATELET # BLD AUTO: 300 K/UL (ref 135–450)
PMV BLD AUTO: 7.2 FL (ref 5–10.5)
POTASSIUM SERPL-SCNC: 4.3 MMOL/L (ref 3.5–5.1)
PROCALCITONIN SERPL IA-MCNC: 0.37 NG/ML (ref 0–0.15)
RBC # BLD AUTO: 4.96 M/UL (ref 4.2–5.9)
SODIUM SERPL-SCNC: 142 MMOL/L (ref 136–145)
WBC # BLD AUTO: 7.2 K/UL (ref 4–11)

## 2024-09-04 PROCEDURE — 6360000002 HC RX W HCPCS: Performed by: INTERNAL MEDICINE

## 2024-09-04 PROCEDURE — 2580000003 HC RX 258: Performed by: INTERNAL MEDICINE

## 2024-09-04 PROCEDURE — 85025 COMPLETE CBC W/AUTO DIFF WBC: CPT

## 2024-09-04 PROCEDURE — 84145 PROCALCITONIN (PCT): CPT

## 2024-09-04 PROCEDURE — 6370000000 HC RX 637 (ALT 250 FOR IP): Performed by: UROLOGY

## 2024-09-04 PROCEDURE — 80048 BASIC METABOLIC PNL TOTAL CA: CPT

## 2024-09-04 PROCEDURE — 36415 COLL VENOUS BLD VENIPUNCTURE: CPT

## 2024-09-04 PROCEDURE — 6370000000 HC RX 637 (ALT 250 FOR IP): Performed by: INTERNAL MEDICINE

## 2024-09-04 RX ORDER — TAMSULOSIN HYDROCHLORIDE 0.4 MG/1
0.4 CAPSULE ORAL DAILY
Qty: 30 CAPSULE | Refills: 3 | Status: SHIPPED | OUTPATIENT
Start: 2024-09-05

## 2024-09-04 RX ORDER — TAMSULOSIN HYDROCHLORIDE 0.4 MG/1
0.4 CAPSULE ORAL DAILY
Qty: 30 CAPSULE | Refills: 3 | Status: SHIPPED | OUTPATIENT
Start: 2024-09-05 | End: 2024-09-04

## 2024-09-04 RX ADMIN — SODIUM CHLORIDE, PRESERVATIVE FREE 10 ML: 5 INJECTION INTRAVENOUS at 08:20

## 2024-09-04 RX ADMIN — NIFEDIPINE 90 MG: 30 TABLET, FILM COATED, EXTENDED RELEASE ORAL at 08:17

## 2024-09-04 RX ADMIN — OLANZAPINE 5 MG: 5 TABLET, FILM COATED ORAL at 08:17

## 2024-09-04 RX ADMIN — CARVEDILOL 6.25 MG: 6.25 TABLET, FILM COATED ORAL at 08:17

## 2024-09-04 RX ADMIN — TAMSULOSIN HYDROCHLORIDE 0.4 MG: 0.4 CAPSULE ORAL at 08:17

## 2024-09-04 RX ADMIN — HEPARIN SODIUM 5000 UNITS: 5000 INJECTION INTRAVENOUS; SUBCUTANEOUS at 05:18

## 2024-09-04 RX ADMIN — Medication 1000 UNITS: at 08:17

## 2024-09-04 NOTE — CARE COORDINATION
Pt has received information and a pamphlet regarding [facility] that will follow up with pt after discharge.  Pt has agreed to this service and [facility] will be contacting patient or patient's family to schedule an after discharge follow up visit at the patient's home.  Pt denied questions at this time and was receptive to this discharge planning intervention. Pamphlet left with patient/family.
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
## 2024-09-04 NOTE — DISCHARGE INSTR - COC
Continuity of Care Form    Patient Name: Tye Resendez   :  1950  MRN:  [id number]    Admit date:  2024  Discharge date:  ***    Code Status Order: Full Code   Advance Directives:   Advance Care Flowsheet Documentation             Admitting Physician:  Abhijeet Diehl MD  PCP: Chris Persaud MD    Discharging Nurse: ***  Discharging Hospital Unit/Room#: 3TN-3368/3368-01  Discharging Unit Phone Number: ***    Emergency Contact:   Extended Emergency Contact Information  Primary Emergency Contact: Arabella Resendez  Home Phone: [phone]  Mobile Phone: [phone]  Relation: Spouse    Past Surgical History:  History reviewed. No pertinent surgical history.    Immunization History:   Immunization History   Administered Date(s) Administered    COVID-19, PFIZER, (age 12y+), IM, 30mcg/0.3mL 2023       Active Problems:  Patient Active Problem List   Diagnosis Code    DIMA (acute kidney injury) ([facility]) N17.9    UTI (urinary tract infection) N39.0    Hematuria R31.9    Hyponatremia E87.1    Sepsis ([facility]) A41.9       Isolation/Infection:   Isolation            No Isolation          Patient Infection Status       None to display                     Nurse Assessment:  Last Vital Signs: BP (!) 149/87   Pulse 86   Temp 97.6 °F (36.4 °C) (Oral)   Resp 18   Ht 1.727 m (5' 8\")   Wt 100.9 kg (222 lb 6.4 oz)   SpO2 96%   BMI 33.82 kg/m²     Last documented pain score (0-10 scale): Pain Level: 8  Last Weight:   Wt Readings from Last 1 Encounters:   24 100.9 kg (222 lb 6.4 oz)     Mental Status:  {IP PT MENTAL STATUS:}    IV Access:  { MURALI IV ACCESS:602618766}    Nursing Mobility/ADLs:  Walking   {CHP DME ADLs:188500542}  Transfer  {CHP DME ADLs:390927825}  Bathing  {CHP DME ADLs:129817503}  Dressing  {CHP DME ADLs:418535451}  Toileting  {CHP DME ADLs:252387640}  Feeding  {CHP DME ADLs:181465167}  Med Admin  {CHP DME ADLs:383253591}  Med Delivery   { MURALI MED Delivery:429041717}    Wound Care

## 2024-09-04 NOTE — CARE COORDINATION
Case Management -  Discharge Note      Patient Name: Tye Resendez                   YOB: 1950            Readmission Risk (Low < 19, Mod (19-27), High > 27): Readmission Risk Score: 11.8    Current PCP: Chris Persaud MD      (IMM) Important Message from Medicare:    Has pt received appropriate IMM before discharge if required: yes  Date: 9/4/2024    PT AM-PAC: 19 /24  OT AM-PAC: 19 /24    Patient/patient representative has been educated on the benefits of HH  as well as the possible risks of declining recommended services. Patient/patient representative has acknowledged the information provided and decided on the following discharge plan. Patient/ patient representative has been provided freedom of choice regarding service provider, supported by basic dialogue that supports the patient's individualized plan of care/goals.    Home Care Information:   Is patient resuming current home health care services: Yes - RN - Jackie Frost at [facility] [phone] reports [facility] has been changing catheter monthly.              [facility]  [address]  [place]  [address]  Phone: [phone]  Fax:[phone]   Services: RN PT/OT   Home Health Order Obtained: on file    Ruby @ [facility] agency notified of discharge.  Ruby is aware pt would like HHC billed through his [facility] benefits. CM provided Ruby with [facility] contact who is aware of order for HHC PT/OT   RN informed ABDOUL that wife came to bedside reporting that Pt does not wish to continue with [facility].      CM placed referrals to [facility] spoke with Arslan in Admissions [phone]     And   [facility] [phone] [address]  _ jackie [facility] can accept. (Cm explained [facility] is first choice, awaiting reply).     CM called Juan at [facility] to report Pt is not returning to their HHC agency .     CM awaiting call back from [facility].       [facility]  [address], [place]

## 2024-09-04 NOTE — DISCHARGE SUMMARY
[facility] -- Physician Discharge Summary     Tye Resendez  1950  MRN: [id number]    Admit Date: 8/31/2024  Discharge Date: No discharge date for patient encounter.    Attending MD: Abhijeet Diehl MD  Discharging MD: Abhijeet Diehl MD  PCP: Chris Persaud MD [address] [facility] ML: [id number] [facility] [address] [phone]    Admission Diagnosis: Bladder outlet obstruction [N32.0]  Acute cystitis with hematuria [N30.01]  Sepsis (HCC) [A41.9]  Acute renal failure, unspecified acute renal failure type (HCC) [N17.9]  DISCHARGE DIAGNOSIS: same    Full Hospital Problem List:  Active Hospital Problems    Diagnosis Date Noted    DIMA (acute kidney injury) (HCC) [N17.9] 08/31/2024    UTI (urinary tract infection) [N39.0] 08/31/2024    Hematuria [R31.9] 08/31/2024    Hyponatremia [E87.1] 08/31/2024    Sepsis (HCC) [A41.9] 08/31/2024           Hospital Course:   73 y.o. male who presents to the emergency department with a chief complaint of some right lower quadrant abdominal pain, constipation, catheter not draining, distention, decreased appetite.  He states this began 3 days ago.  A small hard bowel movement 3 days ago has not had any bowel movement since then.  [facility] and he states it is only been draining blood.  He states he is not even had a full bag of urine drainage into his leg bag since this was placed.  He states he believes it was placed due to kidney failure.  He denies fevers.      ER discovers patient has misplaced Guerra with balloon in the prostatic urethra with severe bladder distention consistent with bladder outlet obstruction. new Guerra catheter was placed and had over 1300 cc drained.      Has leukocytosis of 18,000 with bandemia, hyponatremia of 129. Creatinine is 5.0 and BUN is 70. Started on Rocephin.      Urology and renal consulted to see  Guerra is replaced in ER  Urology evaluated patient and says no further intervention

## 2024-09-04 NOTE — CARE COORDINATION
09/04/24 1120   IMM Letter   IMM Letter given to Patient/Family/Significant other/Guardian/POA/by: IMM given by CM   IMM Letter date given: 09/04/24   IMM Letter time given: 1050

## 2024-09-30 PROBLEM — N39.0 UTI (URINARY TRACT INFECTION): Status: RESOLVED | Noted: 2024-08-31 | Resolved: 2024-09-30

## 2024-10-01 ENCOUNTER — APPOINTMENT (OUTPATIENT)
Dept: CT IMAGING | Age: 74
DRG: 602 | End: 2024-10-01
Payer: MEDICARE

## 2024-10-01 ENCOUNTER — APPOINTMENT (OUTPATIENT)
Dept: VASCULAR LAB | Age: 74
DRG: 602 | End: 2024-10-01
Payer: MEDICARE

## 2024-10-01 ENCOUNTER — HOSPITAL ENCOUNTER (INPATIENT)
Age: 74
LOS: 7 days | Discharge: HOME | DRG: 602 | End: 2024-10-08
Attending: INTERNAL MEDICINE | Admitting: INTERNAL MEDICINE
Payer: MEDICARE

## 2024-10-01 DIAGNOSIS — N18.9 CHRONIC KIDNEY DISEASE, UNSPECIFIED CKD STAGE: ICD-10-CM

## 2024-10-01 DIAGNOSIS — R93.6 ABNORMAL COMPUTED TOMOGRAPHY OF LOWER EXTREMITY: ICD-10-CM

## 2024-10-01 DIAGNOSIS — L02.416 ABSCESS OF LEFT LEG: ICD-10-CM

## 2024-10-01 DIAGNOSIS — L03.116 CELLULITIS OF LEFT LOWER EXTREMITY: Primary | ICD-10-CM

## 2024-10-01 DIAGNOSIS — R73.9 HYPERGLYCEMIA: ICD-10-CM

## 2024-10-01 PROBLEM — M79.89 SOFT TISSUE MASS: Status: ACTIVE | Noted: 2024-10-01

## 2024-10-01 LAB
ALBUMIN SERPL-MCNC: 3.1 G/DL (ref 3.4–5)
ALBUMIN/GLOB SERPL: 0.6 {RATIO} (ref 1.1–2.2)
ALP SERPL-CCNC: 193 U/L (ref 40–129)
ALT SERPL-CCNC: 142 U/L (ref 10–40)
ANION GAP SERPL CALCULATED.3IONS-SCNC: 17 MMOL/L (ref 3–16)
AST SERPL-CCNC: 97 U/L (ref 15–37)
BASOPHILS # BLD: 0.1 K/UL (ref 0–0.2)
BASOPHILS NFR BLD: 0.6 %
BILIRUB SERPL-MCNC: 0.4 MG/DL (ref 0–1)
BUN SERPL-MCNC: 47 MG/DL (ref 7–20)
CALCIUM SERPL-MCNC: 9.2 MG/DL (ref 8.3–10.6)
CHLORIDE SERPL-SCNC: 103 MMOL/L (ref 99–110)
CO2 SERPL-SCNC: 15 MMOL/L (ref 21–32)
CREAT SERPL-MCNC: 3.2 MG/DL (ref 0.8–1.3)
DEPRECATED RDW RBC AUTO: 15.8 % (ref 12.4–15.4)
ECHO BSA: 2.12 M2
EOSINOPHIL # BLD: 0 K/UL (ref 0–0.6)
EOSINOPHIL NFR BLD: 0.4 %
GFR SERPLBLD CREATININE-BSD FMLA CKD-EPI: 19 ML/MIN/{1.73_M2}
GLUCOSE SERPL-MCNC: 204 MG/DL (ref 70–99)
HCT VFR BLD AUTO: 38.8 % (ref 40.5–52.5)
HGB BLD-MCNC: 12.2 G/DL (ref 13.5–17.5)
LACTATE BLDV-SCNC: 1.9 MMOL/L (ref 0.4–1.9)
LYMPHOCYTES # BLD: 1 K/UL (ref 1–5.1)
LYMPHOCYTES NFR BLD: 7.7 %
MCH RBC QN AUTO: 27.5 PG (ref 26–34)
MCHC RBC AUTO-ENTMCNC: 31.4 G/DL (ref 31–36)
MCV RBC AUTO: 87.7 FL (ref 80–100)
MONOCYTES # BLD: 0.9 K/UL (ref 0–1.3)
MONOCYTES NFR BLD: 6.6 %
NEUTROPHILS # BLD: 11.1 K/UL (ref 1.7–7.7)
NEUTROPHILS NFR BLD: 84.7 %
PLATELET # BLD AUTO: 493 K/UL (ref 135–450)
PMV BLD AUTO: 7.4 FL (ref 5–10.5)
POTASSIUM SERPL-SCNC: 4.1 MMOL/L (ref 3.5–5.1)
PROT SERPL-MCNC: 7.9 G/DL (ref 6.4–8.2)
RBC # BLD AUTO: 4.42 M/UL (ref 4.2–5.9)
SODIUM SERPL-SCNC: 135 MMOL/L (ref 136–145)
WBC # BLD AUTO: 13.1 K/UL (ref 4–11)

## 2024-10-01 PROCEDURE — 93971 EXTREMITY STUDY: CPT | Performed by: INTERNAL MEDICINE

## 2024-10-01 PROCEDURE — 2580000003 HC RX 258: Performed by: PHYSICIAN ASSISTANT

## 2024-10-01 PROCEDURE — 99223 1ST HOSP IP/OBS HIGH 75: CPT | Performed by: NURSE PRACTITIONER

## 2024-10-01 PROCEDURE — 85025 COMPLETE CBC W/AUTO DIFF WBC: CPT

## 2024-10-01 PROCEDURE — 51702 INSERT TEMP BLADDER CATH: CPT

## 2024-10-01 PROCEDURE — 96365 THER/PROPH/DIAG IV INF INIT: CPT

## 2024-10-01 PROCEDURE — 83605 ASSAY OF LACTIC ACID: CPT

## 2024-10-01 PROCEDURE — 94760 N-INVAS EAR/PLS OXIMETRY 1: CPT

## 2024-10-01 PROCEDURE — 99285 EMERGENCY DEPT VISIT HI MDM: CPT

## 2024-10-01 PROCEDURE — 1200000000 HC SEMI PRIVATE

## 2024-10-01 PROCEDURE — 6370000000 HC RX 637 (ALT 250 FOR IP): Performed by: INTERNAL MEDICINE

## 2024-10-01 PROCEDURE — 2580000003 HC RX 258: Performed by: INTERNAL MEDICINE

## 2024-10-01 PROCEDURE — 87040 BLOOD CULTURE FOR BACTERIA: CPT

## 2024-10-01 PROCEDURE — 6360000002 HC RX W HCPCS: Performed by: PHYSICIAN ASSISTANT

## 2024-10-01 PROCEDURE — 73700 CT LOWER EXTREMITY W/O DYE: CPT

## 2024-10-01 PROCEDURE — 93971 EXTREMITY STUDY: CPT

## 2024-10-01 PROCEDURE — 80053 COMPREHEN METABOLIC PANEL: CPT

## 2024-10-01 RX ORDER — SODIUM CHLORIDE 9 MG/ML
INJECTION, SOLUTION INTRAVENOUS PRN
Status: DISCONTINUED | OUTPATIENT
Start: 2024-10-01 | End: 2024-10-08 | Stop reason: HOSPADM

## 2024-10-01 RX ORDER — OLANZAPINE 5 MG/1
5 TABLET ORAL DAILY
Status: DISCONTINUED | OUTPATIENT
Start: 2024-10-02 | End: 2024-10-08 | Stop reason: HOSPADM

## 2024-10-01 RX ORDER — SODIUM CHLORIDE 0.9 % (FLUSH) 0.9 %
5-40 SYRINGE (ML) INJECTION EVERY 12 HOURS SCHEDULED
Status: DISCONTINUED | OUTPATIENT
Start: 2024-10-01 | End: 2024-10-08 | Stop reason: HOSPADM

## 2024-10-01 RX ORDER — ACETAMINOPHEN 650 MG/1
650 SUPPOSITORY RECTAL EVERY 6 HOURS PRN
Status: DISCONTINUED | OUTPATIENT
Start: 2024-10-01 | End: 2024-10-08 | Stop reason: HOSPADM

## 2024-10-01 RX ORDER — SODIUM BICARBONATE 325 MG/1
1300 TABLET ORAL 2 TIMES DAILY
COMMUNITY

## 2024-10-01 RX ORDER — HALOPERIDOL 5 MG/1
5 TABLET ORAL 2 TIMES DAILY
Status: DISCONTINUED | OUTPATIENT
Start: 2024-10-01 | End: 2024-10-01

## 2024-10-01 RX ORDER — OLANZAPINE 5 MG/1
5 TABLET ORAL EVERY 8 HOURS PRN
Status: DISCONTINUED | OUTPATIENT
Start: 2024-10-01 | End: 2024-10-01

## 2024-10-01 RX ORDER — TAMSULOSIN HYDROCHLORIDE 0.4 MG/1
0.4 CAPSULE ORAL DAILY
Status: DISCONTINUED | OUTPATIENT
Start: 2024-10-02 | End: 2024-10-08 | Stop reason: HOSPADM

## 2024-10-01 RX ORDER — SODIUM CHLORIDE 0.9 % (FLUSH) 0.9 %
5-40 SYRINGE (ML) INJECTION PRN
Status: DISCONTINUED | OUTPATIENT
Start: 2024-10-01 | End: 2024-10-08 | Stop reason: HOSPADM

## 2024-10-01 RX ORDER — NIFEDIPINE 30 MG/1
90 TABLET, EXTENDED RELEASE ORAL DAILY
Status: DISCONTINUED | OUTPATIENT
Start: 2024-10-02 | End: 2024-10-08 | Stop reason: HOSPADM

## 2024-10-01 RX ORDER — OLANZAPINE 5 MG/1
5 TABLET ORAL NIGHTLY
Status: DISCONTINUED | OUTPATIENT
Start: 2024-10-01 | End: 2024-10-01

## 2024-10-01 RX ORDER — OLANZAPINE 5 MG/1
10 TABLET ORAL NIGHTLY
Status: DISCONTINUED | OUTPATIENT
Start: 2024-10-01 | End: 2024-10-08 | Stop reason: HOSPADM

## 2024-10-01 RX ORDER — POLYETHYLENE GLYCOL 3350 17 G/17G
17 POWDER, FOR SOLUTION ORAL DAILY PRN
Status: DISCONTINUED | OUTPATIENT
Start: 2024-10-01 | End: 2024-10-08 | Stop reason: HOSPADM

## 2024-10-01 RX ORDER — CARVEDILOL 6.25 MG/1
6.25 TABLET ORAL 2 TIMES DAILY WITH MEALS
Status: DISCONTINUED | OUTPATIENT
Start: 2024-10-02 | End: 2024-10-08 | Stop reason: HOSPADM

## 2024-10-01 RX ORDER — LANOLIN ALCOHOL/MO/W.PET/CERES
3 CREAM (GRAM) TOPICAL NIGHTLY PRN
Status: DISCONTINUED | OUTPATIENT
Start: 2024-10-01 | End: 2024-10-08 | Stop reason: HOSPADM

## 2024-10-01 RX ORDER — VITAMIN B COMPLEX
1000 TABLET ORAL DAILY
Status: DISCONTINUED | OUTPATIENT
Start: 2024-10-02 | End: 2024-10-08 | Stop reason: HOSPADM

## 2024-10-01 RX ORDER — ACETAMINOPHEN 325 MG/1
650 TABLET ORAL EVERY 6 HOURS PRN
Status: DISCONTINUED | OUTPATIENT
Start: 2024-10-01 | End: 2024-10-08 | Stop reason: HOSPADM

## 2024-10-01 RX ADMIN — CEFEPIME 2000 MG: 2 INJECTION, POWDER, FOR SOLUTION INTRAVENOUS at 15:37

## 2024-10-01 RX ADMIN — OLANZAPINE 10 MG: 5 TABLET, FILM COATED ORAL at 19:46

## 2024-10-01 RX ADMIN — VANCOMYCIN HYDROCHLORIDE 1750 MG: 10 INJECTION, POWDER, LYOPHILIZED, FOR SOLUTION INTRAVENOUS at 17:43

## 2024-10-01 RX ADMIN — Medication 10 ML: at 19:46

## 2024-10-01 ASSESSMENT — PAIN - FUNCTIONAL ASSESSMENT: PAIN_FUNCTIONAL_ASSESSMENT: NONE - DENIES PAIN

## 2024-10-01 NOTE — FLOWSHEET NOTE
10/01/24 1941   Nursing Delirium Screening Scale (Nu-DESC)   Disorientation 0   Innappropriate Behavior 0   Innappropriate Communication 0   Illusions/Hallucinations 0   Psychomotor Retardation 0   Nu-DESC Score (calculated) 0

## 2024-10-01 NOTE — ED PROVIDER NOTES
[facility] EMERGENCY DEPARTMENT  EMERGENCY DEPARTMENT ENCOUNTER        Pt Name: Tye Resendez  MRN: [id number]  Birthdate 1950  Date of evaluation: 10/1/2024  Provider: Lizbeth Delacruz PA-C  PCP: Chris Persaud MD  Note Started: 12:45 PM EDT 10/1/24      CARLOS EDUARDO. I have evaluated this patient.        CHIEF COMPLAINT       Chief Complaint   Patient presents with    Leg Pain     Pt arrives from home by wife. Pt C/O L lower leg pain for 4 days.        HISTORY OF PRESENT ILLNESS: 1 or more Elements     History From: patient, wif e   Limitations to history : dementia    Tye Resendez is a 73 y.o. male who presents for evaluation of concern for an infection in his left leg.  Reports swelling and redness x 1 week.  History of similar symptoms treated with antibiotics per wife.  No injury or trauma.  No fevers or chills.  No drainage.  No additional information is able to be obtained at this time.    Nursing Notes were all reviewed and agreed with or any disagreements were addressed in the HPI.    REVIEW OF SYSTEMS :      Review of Systems   Constitutional:  Negative for appetite change, chills and fever.   HENT:  Negative for congestion and rhinorrhea.    Respiratory:  Negative for cough, shortness of breath and wheezing.    Cardiovascular:  Positive for leg swelling. Negative for chest pain.   Gastrointestinal:  Negative for abdominal pain, diarrhea, nausea and vomiting.   Genitourinary:  Negative for difficulty urinating, dysuria and hematuria.   Musculoskeletal:  Negative for neck pain and neck stiffness.   Skin:  Positive for color change. Negative for rash.   Neurological:  Negative for weakness, numbness and headaches.       Positives and Pertinent negatives as per HPI.     SURGICAL HISTORY   No past surgical history on file.    CURRENTMEDICATIONS       Previous Medications    CARVEDILOL (COREG) 6.25 MG TABLET    Take 1 tablet by mouth 2 times daily (with meals)    HALOPERIDOL (HALDOL) 5 MG

## 2024-10-01 NOTE — ED NOTES
How does patient ambulate?   []Low Fall Risk (ambulates by themselves without support)  []Stand by assist   []Contact Guard   []Front wheel walker  []Wheelchair   []Steady  []Bed bound  []History of Lower Extremity Amputation  [x]Unknown, did not assess in the emergency department   How does patient take pills?  []Whole with Water  []Crushed in applesauce  []Crushed in pudding  []Other  [x]Unknown no oral medications were given in the ED  Is patient alert?   [x]Alert  []Drowsy but responds to voice  []Doesn't respond to voice but responds to painful stimuli  []Unresponsive  Is patient oriented?   [x]To person  []To place  []To time  []To situation  [x]Confused  []Agitated  [x]Follows commands    Any specific patient or family belongings/needs/dynamics?   N/a  Miscellaneous comments/pending orders?  N/a     If there are any additional questions please reach out to the Emergency Department.

## 2024-10-01 NOTE — ACP (ADVANCE CARE PLANNING)
Advanced Care Planning Note.    Purpose of Encounter: Advanced care planning in light of hospitalization  Parties In Attendance: Patient,    Decisional Capacity: Yes  Subjective: Patient  understand that this conversation is to address long term care goal  Objective: Patient mated to the hospital with left lower extremity cellulitis with possible underlying mass  Goals of Care Determination: Patient would pursue CPR and Intubation if required.  Code Status: full code  Time spent on Advanced care Plannin minutes  Advanced Care Planning Documents: documented patient's wishes, would like Wife Arabella to make medical decisions if unable to make decisions    Clara Jones MD  10/1/2024 4:30 PM

## 2024-10-01 NOTE — H&P
Take 1 tablet by mouth 2 times daily (with meals)      NIFEdipine (ADALAT CC) 90 MG extended release tablet Take 1 tablet by mouth daily      OLANZapine (ZYPREXA) 5 MG tablet Take 1 tablet by mouth nightly      vitamin D (CHOLECALCIFEROL) 25 MCG (1000 UT) TABS tablet Take 1 tablet by mouth daily      OLANZapine (ZYPREXA) 5 MG tablet Take 1 tablet by mouth every 8 hours as needed (For schizophrenia)      haloperidol (HALDOL) 5 MG tablet Take 1 tablet by mouth 2 times daily      melatonin 3 MG TABS tablet Take 1 tablet by mouth daily         Allergies:  No Known Allergies     Social History:  Patient Lives at home   reports that he has quit smoking. He has never used smokeless tobacco. He reports that he does not currently use alcohol. He reports that he does not use drugs.       Physical Exam:  /76   Pulse 89   Temp 97.6 °F (36.4 °C) (Oral)   Resp 18   Ht 1.727 m (5' 8\")   Wt 93.4 kg (206 lb)   SpO2 98%   BMI 31.32 kg/m²     General appearance:  Appears comfortable. AAOx3  HEENT: atraumatic, Pupils equal, muscous membranes moist, no masses appreciated  Cardiovascular: Regular rate and rhythm no murmurs appreciated  Respiratory: CTAB no wheezing  Gastrointestinal: Abdomen soft, non-tender, BS+  EXT: no edema  Neurology: no gross focal deficts  Psychiatry: Appropriate affect. Not agitated  Skin:left lower ext swelling with erythema   Labs:  CBC:   Lab Results   Component Value Date/Time    WBC 13.1 10/01/2024 12:42 PM    RBC 4.42 10/01/2024 12:42 PM    HGB 12.2 10/01/2024 12:42 PM    HCT 38.8 10/01/2024 12:42 PM    MCV 87.7 10/01/2024 12:42 PM    MCH 27.5 10/01/2024 12:42 PM    MCHC 31.4 10/01/2024 12:42 PM    RDW 15.8 10/01/2024 12:42 PM     10/01/2024 12:42 PM    MPV 7.4 10/01/2024 12:42 PM     BMP:    Lab Results   Component Value Date/Time     10/01/2024 12:42 PM    K 4.1 10/01/2024 12:42 PM    K 4.5 08/31/2024 10:01 AM     10/01/2024 12:42 PM    CO2 15 10/01/2024 12:42 PM    BUN 47

## 2024-10-02 LAB
ALBUMIN SERPL-MCNC: 2.9 G/DL (ref 3.4–5)
ALP SERPL-CCNC: 190 U/L (ref 40–129)
ALT SERPL-CCNC: 140 U/L (ref 10–40)
ANION GAP SERPL CALCULATED.3IONS-SCNC: 17 MMOL/L (ref 3–16)
AST SERPL-CCNC: 90 U/L (ref 15–37)
BASOPHILS # BLD: 0.1 K/UL (ref 0–0.2)
BASOPHILS NFR BLD: 0.8 %
BILIRUB DIRECT SERPL-MCNC: 0.2 MG/DL (ref 0–0.3)
BILIRUB INDIRECT SERPL-MCNC: 0.4 MG/DL (ref 0–1)
BILIRUB SERPL-MCNC: 0.6 MG/DL (ref 0–1)
BUN SERPL-MCNC: 44 MG/DL (ref 7–20)
CALCIUM SERPL-MCNC: 9.2 MG/DL (ref 8.3–10.6)
CHLORIDE SERPL-SCNC: 106 MMOL/L (ref 99–110)
CO2 SERPL-SCNC: 14 MMOL/L (ref 21–32)
CREAT SERPL-MCNC: 2.8 MG/DL (ref 0.8–1.3)
DEPRECATED RDW RBC AUTO: 15.8 % (ref 12.4–15.4)
EOSINOPHIL # BLD: 0 K/UL (ref 0–0.6)
EOSINOPHIL NFR BLD: 0.3 %
GFR SERPLBLD CREATININE-BSD FMLA CKD-EPI: 23 ML/MIN/{1.73_M2}
GLUCOSE SERPL-MCNC: 116 MG/DL (ref 70–99)
HCT VFR BLD AUTO: 36.3 % (ref 40.5–52.5)
HGB BLD-MCNC: 12.2 G/DL (ref 13.5–17.5)
LYMPHOCYTES # BLD: 1.4 K/UL (ref 1–5.1)
LYMPHOCYTES NFR BLD: 11.7 %
MCH RBC QN AUTO: 29.2 PG (ref 26–34)
MCHC RBC AUTO-ENTMCNC: 33.6 G/DL (ref 31–36)
MCV RBC AUTO: 87.1 FL (ref 80–100)
MONOCYTES # BLD: 1.2 K/UL (ref 0–1.3)
MONOCYTES NFR BLD: 10.1 %
NEUTROPHILS # BLD: 9.1 K/UL (ref 1.7–7.7)
NEUTROPHILS NFR BLD: 77.1 %
PLATELET # BLD AUTO: 491 K/UL (ref 135–450)
PMV BLD AUTO: 7 FL (ref 5–10.5)
POTASSIUM SERPL-SCNC: 4.4 MMOL/L (ref 3.5–5.1)
PROT SERPL-MCNC: 7.4 G/DL (ref 6.4–8.2)
RBC # BLD AUTO: 4.17 M/UL (ref 4.2–5.9)
SODIUM SERPL-SCNC: 137 MMOL/L (ref 136–145)
WBC # BLD AUTO: 11.8 K/UL (ref 4–11)

## 2024-10-02 PROCEDURE — 80048 BASIC METABOLIC PNL TOTAL CA: CPT

## 2024-10-02 PROCEDURE — 99232 SBSQ HOSP IP/OBS MODERATE 35: CPT | Performed by: NURSE PRACTITIONER

## 2024-10-02 PROCEDURE — 1200000000 HC SEMI PRIVATE

## 2024-10-02 PROCEDURE — 6370000000 HC RX 637 (ALT 250 FOR IP): Performed by: INTERNAL MEDICINE

## 2024-10-02 PROCEDURE — 97165 OT EVAL LOW COMPLEX 30 MIN: CPT

## 2024-10-02 PROCEDURE — 6360000002 HC RX W HCPCS: Performed by: INTERNAL MEDICINE

## 2024-10-02 PROCEDURE — 2580000003 HC RX 258: Performed by: INTERNAL MEDICINE

## 2024-10-02 PROCEDURE — 97161 PT EVAL LOW COMPLEX 20 MIN: CPT

## 2024-10-02 PROCEDURE — 2500000003 HC RX 250 WO HCPCS: Performed by: INTERNAL MEDICINE

## 2024-10-02 PROCEDURE — 80076 HEPATIC FUNCTION PANEL: CPT

## 2024-10-02 PROCEDURE — 36415 COLL VENOUS BLD VENIPUNCTURE: CPT

## 2024-10-02 PROCEDURE — 85025 COMPLETE CBC W/AUTO DIFF WBC: CPT

## 2024-10-02 PROCEDURE — 97535 SELF CARE MNGMENT TRAINING: CPT

## 2024-10-02 PROCEDURE — 97530 THERAPEUTIC ACTIVITIES: CPT

## 2024-10-02 PROCEDURE — 97116 GAIT TRAINING THERAPY: CPT

## 2024-10-02 RX ADMIN — Medication 1000 UNITS: at 11:09

## 2024-10-02 RX ADMIN — Medication 10 ML: at 11:10

## 2024-10-02 RX ADMIN — OLANZAPINE 5 MG: 5 TABLET, FILM COATED ORAL at 11:09

## 2024-10-02 RX ADMIN — OLANZAPINE 10 MG: 5 TABLET, FILM COATED ORAL at 19:52

## 2024-10-02 RX ADMIN — CARVEDILOL 6.25 MG: 6.25 TABLET, FILM COATED ORAL at 18:18

## 2024-10-02 RX ADMIN — Medication 10 ML: at 19:52

## 2024-10-02 RX ADMIN — NIFEDIPINE 90 MG: 30 TABLET, FILM COATED, EXTENDED RELEASE ORAL at 11:09

## 2024-10-02 RX ADMIN — CEFEPIME 1000 MG: 1 INJECTION, POWDER, FOR SOLUTION INTRAMUSCULAR; INTRAVENOUS at 16:17

## 2024-10-02 RX ADMIN — SODIUM BICARBONATE: 84 INJECTION, SOLUTION INTRAVENOUS at 13:20

## 2024-10-02 RX ADMIN — CARVEDILOL 6.25 MG: 6.25 TABLET, FILM COATED ORAL at 11:12

## 2024-10-03 ENCOUNTER — APPOINTMENT (OUTPATIENT)
Dept: MRI IMAGING | Age: 74
DRG: 602 | End: 2024-10-03
Payer: MEDICARE

## 2024-10-03 LAB
ALBUMIN SERPL-MCNC: 2.7 G/DL (ref 3.4–5)
ALP SERPL-CCNC: 190 U/L (ref 40–129)
ALT SERPL-CCNC: 158 U/L (ref 10–40)
ANION GAP SERPL CALCULATED.3IONS-SCNC: 15 MMOL/L (ref 3–16)
AST SERPL-CCNC: 89 U/L (ref 15–37)
BACTERIA URNS QL MICRO: ABNORMAL /HPF
BASOPHILS # BLD: 0.1 K/UL (ref 0–0.2)
BASOPHILS NFR BLD: 1 %
BILIRUB DIRECT SERPL-MCNC: 0.2 MG/DL (ref 0–0.3)
BILIRUB INDIRECT SERPL-MCNC: 0.3 MG/DL (ref 0–1)
BILIRUB SERPL-MCNC: 0.5 MG/DL (ref 0–1)
BILIRUB UR QL STRIP.AUTO: NEGATIVE
BUN SERPL-MCNC: 40 MG/DL (ref 7–20)
CALCIUM SERPL-MCNC: 9.4 MG/DL (ref 8.3–10.6)
CHLORIDE SERPL-SCNC: 103 MMOL/L (ref 99–110)
CHLORIDE UR-SCNC: 59 MMOL/L
CLARITY UR: ABNORMAL
CO2 SERPL-SCNC: 21 MMOL/L (ref 21–32)
COLOR UR: YELLOW
CREAT SERPL-MCNC: 2.5 MG/DL (ref 0.8–1.3)
CREAT UR-MCNC: 60.7 MG/DL (ref 39–259)
DEPRECATED RDW RBC AUTO: 15.9 % (ref 12.4–15.4)
EOSINOPHIL # BLD: 0.1 K/UL (ref 0–0.6)
EOSINOPHIL NFR BLD: 0.5 %
EPI CELLS #/AREA URNS AUTO: 4 /HPF (ref 0–5)
GFR SERPLBLD CREATININE-BSD FMLA CKD-EPI: 26 ML/MIN/{1.73_M2}
GLUCOSE SERPL-MCNC: 126 MG/DL (ref 70–99)
GLUCOSE UR STRIP.AUTO-MCNC: NEGATIVE MG/DL
HCT VFR BLD AUTO: 39.1 % (ref 40.5–52.5)
HGB BLD-MCNC: 12.5 G/DL (ref 13.5–17.5)
HGB UR QL STRIP.AUTO: ABNORMAL
HYALINE CASTS #/AREA URNS AUTO: 6 /LPF (ref 0–8)
KETONES UR STRIP.AUTO-MCNC: NEGATIVE MG/DL
LEUKOCYTE ESTERASE UR QL STRIP.AUTO: ABNORMAL
LYMPHOCYTES # BLD: 1.8 K/UL (ref 1–5.1)
LYMPHOCYTES NFR BLD: 12.5 %
MAGNESIUM SERPL-MCNC: 2.2 MG/DL (ref 1.8–2.4)
MCH RBC QN AUTO: 27.6 PG (ref 26–34)
MCHC RBC AUTO-ENTMCNC: 31.9 G/DL (ref 31–36)
MCV RBC AUTO: 86.5 FL (ref 80–100)
MICROALBUMIN UR DL<=1MG/L-MCNC: 14.2 MG/DL
MICROALBUMIN/CREAT UR: 233.9 MG/G (ref 0–30)
MONOCYTES # BLD: 1.2 K/UL (ref 0–1.3)
MONOCYTES NFR BLD: 8.2 %
NEUTROPHILS # BLD: 11.2 K/UL (ref 1.7–7.7)
NEUTROPHILS NFR BLD: 77.8 %
NITRITE UR QL STRIP.AUTO: NEGATIVE
PH UR STRIP.AUTO: 7 [PH] (ref 5–8)
PHOSPHATE SERPL-MCNC: 3.5 MG/DL (ref 2.5–4.9)
PLATELET # BLD AUTO: 561 K/UL (ref 135–450)
PMV BLD AUTO: 7.3 FL (ref 5–10.5)
POTASSIUM SERPL-SCNC: 4.5 MMOL/L (ref 3.5–5.1)
POTASSIUM UR-SCNC: 20 MMOL/L
PROT SERPL-MCNC: 7.6 G/DL (ref 6.4–8.2)
PROT UR STRIP.AUTO-MCNC: 100 MG/DL
PROT UR-MCNC: 68.2 MG/DL
PROT/CREAT UR-RTO: 1.1 MG/DL
RBC # BLD AUTO: 4.52 M/UL (ref 4.2–5.9)
RBC CLUMPS #/AREA URNS AUTO: 1 /HPF (ref 0–4)
SODIUM SERPL-SCNC: 139 MMOL/L (ref 136–145)
SODIUM UR-SCNC: 84 MMOL/L
SP GR UR STRIP.AUTO: 1.01 (ref 1–1.03)
UA DIPSTICK W REFLEX MICRO PNL UR: YES
URN SPEC COLLECT METH UR: ABNORMAL
UROBILINOGEN UR STRIP-ACNC: 1 E.U./DL
UUN UR-MCNC: 472 MG/DL (ref 800–1666)
WBC # BLD AUTO: 14.4 K/UL (ref 4–11)
WBC #/AREA URNS AUTO: 87 /HPF (ref 0–5)

## 2024-10-03 PROCEDURE — 97530 THERAPEUTIC ACTIVITIES: CPT

## 2024-10-03 PROCEDURE — 84156 ASSAY OF PROTEIN URINE: CPT

## 2024-10-03 PROCEDURE — 2580000003 HC RX 258: Performed by: INTERNAL MEDICINE

## 2024-10-03 PROCEDURE — 83735 ASSAY OF MAGNESIUM: CPT

## 2024-10-03 PROCEDURE — 85025 COMPLETE CBC W/AUTO DIFF WBC: CPT

## 2024-10-03 PROCEDURE — 36415 COLL VENOUS BLD VENIPUNCTURE: CPT

## 2024-10-03 PROCEDURE — 84300 ASSAY OF URINE SODIUM: CPT

## 2024-10-03 PROCEDURE — 87070 CULTURE OTHR SPECIMN AEROBIC: CPT

## 2024-10-03 PROCEDURE — 6370000000 HC RX 637 (ALT 250 FOR IP): Performed by: INTERNAL MEDICINE

## 2024-10-03 PROCEDURE — 87205 SMEAR GRAM STAIN: CPT

## 2024-10-03 PROCEDURE — 80076 HEPATIC FUNCTION PANEL: CPT

## 2024-10-03 PROCEDURE — 82043 UR ALBUMIN QUANTITATIVE: CPT

## 2024-10-03 PROCEDURE — 82570 ASSAY OF URINE CREATININE: CPT

## 2024-10-03 PROCEDURE — 1200000000 HC SEMI PRIVATE

## 2024-10-03 PROCEDURE — 80069 RENAL FUNCTION PANEL: CPT

## 2024-10-03 PROCEDURE — 2500000003 HC RX 250 WO HCPCS: Performed by: INTERNAL MEDICINE

## 2024-10-03 PROCEDURE — 6360000002 HC RX W HCPCS: Performed by: INTERNAL MEDICINE

## 2024-10-03 PROCEDURE — 97110 THERAPEUTIC EXERCISES: CPT

## 2024-10-03 PROCEDURE — 87086 URINE CULTURE/COLONY COUNT: CPT

## 2024-10-03 PROCEDURE — 73718 MRI LOWER EXTREMITY W/O DYE: CPT

## 2024-10-03 PROCEDURE — 81001 URINALYSIS AUTO W/SCOPE: CPT

## 2024-10-03 PROCEDURE — 84540 ASSAY OF URINE/UREA-N: CPT

## 2024-10-03 PROCEDURE — 84133 ASSAY OF URINE POTASSIUM: CPT

## 2024-10-03 PROCEDURE — 82436 ASSAY OF URINE CHLORIDE: CPT

## 2024-10-03 RX ORDER — SODIUM CHLORIDE 9 MG/ML
INJECTION, SOLUTION INTRAVENOUS CONTINUOUS
Status: DISCONTINUED | OUTPATIENT
Start: 2024-10-03 | End: 2024-10-04

## 2024-10-03 RX ADMIN — ACETAMINOPHEN 650 MG: 325 TABLET ORAL at 19:53

## 2024-10-03 RX ADMIN — CARVEDILOL 6.25 MG: 6.25 TABLET, FILM COATED ORAL at 09:19

## 2024-10-03 RX ADMIN — Medication 10 ML: at 09:19

## 2024-10-03 RX ADMIN — OLANZAPINE 10 MG: 5 TABLET, FILM COATED ORAL at 19:54

## 2024-10-03 RX ADMIN — SODIUM BICARBONATE: 84 INJECTION, SOLUTION INTRAVENOUS at 03:52

## 2024-10-03 RX ADMIN — Medication 10 ML: at 19:54

## 2024-10-03 RX ADMIN — CEFEPIME 1000 MG: 1 INJECTION, POWDER, FOR SOLUTION INTRAMUSCULAR; INTRAVENOUS at 15:31

## 2024-10-03 RX ADMIN — CARVEDILOL 6.25 MG: 6.25 TABLET, FILM COATED ORAL at 16:55

## 2024-10-03 RX ADMIN — Medication 1000 UNITS: at 09:19

## 2024-10-03 RX ADMIN — SODIUM CHLORIDE: 9 INJECTION, SOLUTION INTRAVENOUS at 12:51

## 2024-10-03 RX ADMIN — OLANZAPINE 5 MG: 5 TABLET, FILM COATED ORAL at 09:19

## 2024-10-03 RX ADMIN — NIFEDIPINE 90 MG: 30 TABLET, FILM COATED, EXTENDED RELEASE ORAL at 09:19

## 2024-10-03 ASSESSMENT — PAIN SCALES - GENERAL
PAINLEVEL_OUTOF10: 0
PAINLEVEL_OUTOF10: 3

## 2024-10-03 ASSESSMENT — PAIN DESCRIPTION - ORIENTATION: ORIENTATION: LEFT

## 2024-10-03 ASSESSMENT — PAIN DESCRIPTION - DESCRIPTORS: DESCRIPTORS: ACHING

## 2024-10-03 ASSESSMENT — PAIN DESCRIPTION - LOCATION: LOCATION: LEG

## 2024-10-04 ENCOUNTER — APPOINTMENT (OUTPATIENT)
Dept: INTERVENTIONAL RADIOLOGY/VASCULAR | Age: 74
DRG: 602 | End: 2024-10-04
Payer: MEDICARE

## 2024-10-04 ENCOUNTER — APPOINTMENT (OUTPATIENT)
Dept: ULTRASOUND IMAGING | Age: 74
DRG: 602 | End: 2024-10-04
Payer: MEDICARE

## 2024-10-04 LAB
ALBUMIN SERPL-MCNC: 3.1 G/DL (ref 3.4–5)
ALP SERPL-CCNC: 201 U/L (ref 40–129)
ALT SERPL-CCNC: 185 U/L (ref 10–40)
ANION GAP SERPL CALCULATED.3IONS-SCNC: 12 MMOL/L (ref 3–16)
AST SERPL-CCNC: 97 U/L (ref 15–37)
BACTERIA UR CULT: NORMAL
BASOPHILS # BLD: 0.1 K/UL (ref 0–0.2)
BASOPHILS NFR BLD: 0.6 %
BILIRUB DIRECT SERPL-MCNC: 0.2 MG/DL (ref 0–0.3)
BILIRUB INDIRECT SERPL-MCNC: 0.3 MG/DL (ref 0–1)
BILIRUB SERPL-MCNC: 0.5 MG/DL (ref 0–1)
BUN SERPL-MCNC: 36 MG/DL (ref 7–20)
CALCIUM SERPL-MCNC: 9.2 MG/DL (ref 8.3–10.6)
CHLORIDE SERPL-SCNC: 103 MMOL/L (ref 99–110)
CO2 SERPL-SCNC: 23 MMOL/L (ref 21–32)
CREAT SERPL-MCNC: 2.4 MG/DL (ref 0.8–1.3)
DEPRECATED RDW RBC AUTO: 15.5 % (ref 12.4–15.4)
EOSINOPHIL # BLD: 0.1 K/UL (ref 0–0.6)
EOSINOPHIL NFR BLD: 0.7 %
GFR SERPLBLD CREATININE-BSD FMLA CKD-EPI: 27 ML/MIN/{1.73_M2}
GLUCOSE SERPL-MCNC: 114 MG/DL (ref 70–99)
HAV IGM SERPL QL IA: NORMAL
HBV CORE IGM SERPL QL IA: NORMAL
HBV SURFACE AG SERPL QL IA: NORMAL
HCT VFR BLD AUTO: 36.7 % (ref 40.5–52.5)
HCV AB SERPL QL IA: NORMAL
HGB BLD-MCNC: 11.9 G/DL (ref 13.5–17.5)
INR PPP: 1.23 (ref 0.85–1.15)
LYMPHOCYTES # BLD: 1.5 K/UL (ref 1–5.1)
LYMPHOCYTES NFR BLD: 11.8 %
MAGNESIUM SERPL-MCNC: 2.1 MG/DL (ref 1.8–2.4)
MCH RBC QN AUTO: 28 PG (ref 26–34)
MCHC RBC AUTO-ENTMCNC: 32.4 G/DL (ref 31–36)
MCV RBC AUTO: 86.5 FL (ref 80–100)
MONOCYTES # BLD: 1.1 K/UL (ref 0–1.3)
MONOCYTES NFR BLD: 8.8 %
NEUTROPHILS # BLD: 9.9 K/UL (ref 1.7–7.7)
NEUTROPHILS NFR BLD: 78.1 %
PHOSPHATE SERPL-MCNC: 3.8 MG/DL (ref 2.5–4.9)
PLATELET # BLD AUTO: 602 K/UL (ref 135–450)
PMV BLD AUTO: 7.3 FL (ref 5–10.5)
POTASSIUM SERPL-SCNC: 4.1 MMOL/L (ref 3.5–5.1)
PROT SERPL-MCNC: 7.2 G/DL (ref 6.4–8.2)
PROTHROMBIN TIME: 15.7 SEC (ref 11.9–14.9)
RBC # BLD AUTO: 4.24 M/UL (ref 4.2–5.9)
SODIUM SERPL-SCNC: 138 MMOL/L (ref 136–145)
WBC # BLD AUTO: 12.6 K/UL (ref 4–11)

## 2024-10-04 PROCEDURE — 80076 HEPATIC FUNCTION PANEL: CPT

## 2024-10-04 PROCEDURE — 86015 ACTIN ANTIBODY EACH: CPT

## 2024-10-04 PROCEDURE — 86038 ANTINUCLEAR ANTIBODIES: CPT

## 2024-10-04 PROCEDURE — 2580000003 HC RX 258: Performed by: INTERNAL MEDICINE

## 2024-10-04 PROCEDURE — 83735 ASSAY OF MAGNESIUM: CPT

## 2024-10-04 PROCEDURE — 82390 ASSAY OF CERULOPLASMIN: CPT

## 2024-10-04 PROCEDURE — 1200000000 HC SEMI PRIVATE

## 2024-10-04 PROCEDURE — 76705 ECHO EXAM OF ABDOMEN: CPT

## 2024-10-04 PROCEDURE — 83550 IRON BINDING TEST: CPT

## 2024-10-04 PROCEDURE — 83540 ASSAY OF IRON: CPT

## 2024-10-04 PROCEDURE — 82104 ALPHA-1-ANTITRYPSIN PHENO: CPT

## 2024-10-04 PROCEDURE — 80074 ACUTE HEPATITIS PANEL: CPT

## 2024-10-04 PROCEDURE — 36415 COLL VENOUS BLD VENIPUNCTURE: CPT

## 2024-10-04 PROCEDURE — 83516 IMMUNOASSAY NONANTIBODY: CPT

## 2024-10-04 PROCEDURE — 82103 ALPHA-1-ANTITRYPSIN TOTAL: CPT

## 2024-10-04 PROCEDURE — 6370000000 HC RX 637 (ALT 250 FOR IP): Performed by: INTERNAL MEDICINE

## 2024-10-04 PROCEDURE — 85025 COMPLETE CBC W/AUTO DIFF WBC: CPT

## 2024-10-04 PROCEDURE — 6360000002 HC RX W HCPCS: Performed by: INTERNAL MEDICINE

## 2024-10-04 PROCEDURE — 85610 PROTHROMBIN TIME: CPT

## 2024-10-04 PROCEDURE — 80069 RENAL FUNCTION PANEL: CPT

## 2024-10-04 PROCEDURE — 82728 ASSAY OF FERRITIN: CPT

## 2024-10-04 RX ADMIN — CARVEDILOL 6.25 MG: 6.25 TABLET, FILM COATED ORAL at 17:28

## 2024-10-04 RX ADMIN — ACETAMINOPHEN 650 MG: 325 TABLET ORAL at 19:42

## 2024-10-04 RX ADMIN — Medication 10 ML: at 19:42

## 2024-10-04 RX ADMIN — Medication 1000 UNITS: at 14:53

## 2024-10-04 RX ADMIN — SODIUM CHLORIDE: 9 INJECTION, SOLUTION INTRAVENOUS at 05:35

## 2024-10-04 RX ADMIN — OLANZAPINE 5 MG: 5 TABLET, FILM COATED ORAL at 14:54

## 2024-10-04 RX ADMIN — TAMSULOSIN HYDROCHLORIDE 0.4 MG: 0.4 CAPSULE ORAL at 14:54

## 2024-10-04 RX ADMIN — Medication 10 ML: at 08:45

## 2024-10-04 RX ADMIN — CEFEPIME 1000 MG: 1 INJECTION, POWDER, FOR SOLUTION INTRAMUSCULAR; INTRAVENOUS at 17:32

## 2024-10-04 RX ADMIN — OLANZAPINE 10 MG: 5 TABLET, FILM COATED ORAL at 19:42

## 2024-10-04 RX ADMIN — NIFEDIPINE 90 MG: 30 TABLET, FILM COATED, EXTENDED RELEASE ORAL at 14:54

## 2024-10-04 ASSESSMENT — PAIN SCALES - GENERAL
PAINLEVEL_OUTOF10: 4
PAINLEVEL_OUTOF10: 2

## 2024-10-04 ASSESSMENT — PAIN DESCRIPTION - LOCATION: LOCATION: LEG

## 2024-10-04 ASSESSMENT — PAIN DESCRIPTION - DESCRIPTORS: DESCRIPTORS: ACHING

## 2024-10-04 ASSESSMENT — PAIN DESCRIPTION - ORIENTATION: ORIENTATION: LEFT

## 2024-10-05 LAB
ALBUMIN SERPL-MCNC: 2.8 G/DL (ref 3.4–5)
ALP SERPL-CCNC: 200 U/L (ref 40–129)
ALT SERPL-CCNC: 148 U/L (ref 10–40)
ANA SER QL IA: NEGATIVE
ANION GAP SERPL CALCULATED.3IONS-SCNC: 15 MMOL/L (ref 3–16)
AST SERPL-CCNC: 59 U/L (ref 15–37)
BACTERIA BLD CULT ORG #2: NORMAL
BACTERIA BLD CULT: NORMAL
BASOPHILS # BLD: 0.1 K/UL (ref 0–0.2)
BASOPHILS NFR BLD: 0.4 %
BILIRUB DIRECT SERPL-MCNC: 0.2 MG/DL (ref 0–0.3)
BILIRUB INDIRECT SERPL-MCNC: 0.3 MG/DL (ref 0–1)
BILIRUB SERPL-MCNC: 0.5 MG/DL (ref 0–1)
BUN SERPL-MCNC: 36 MG/DL (ref 7–20)
CALCIUM SERPL-MCNC: 9.5 MG/DL (ref 8.3–10.6)
CHLORIDE SERPL-SCNC: 106 MMOL/L (ref 99–110)
CO2 SERPL-SCNC: 19 MMOL/L (ref 21–32)
CREAT SERPL-MCNC: 2.4 MG/DL (ref 0.8–1.3)
DEPRECATED RDW RBC AUTO: 15.8 % (ref 12.4–15.4)
EOSINOPHIL # BLD: 0.1 K/UL (ref 0–0.6)
EOSINOPHIL NFR BLD: 1 %
FERRITIN SERPL IA-MCNC: 678 NG/ML (ref 30–400)
GFR SERPLBLD CREATININE-BSD FMLA CKD-EPI: 27 ML/MIN/{1.73_M2}
GLUCOSE SERPL-MCNC: 111 MG/DL (ref 70–99)
HCT VFR BLD AUTO: 38.3 % (ref 40.5–52.5)
HGB BLD-MCNC: 12.3 G/DL (ref 13.5–17.5)
IRON SATN MFR SERPL: 12 % (ref 20–50)
IRON SERPL-MCNC: 16 UG/DL (ref 59–158)
LYMPHOCYTES # BLD: 1.6 K/UL (ref 1–5.1)
LYMPHOCYTES NFR BLD: 12 %
MAGNESIUM SERPL-MCNC: 2.2 MG/DL (ref 1.8–2.4)
MCH RBC QN AUTO: 28.1 PG (ref 26–34)
MCHC RBC AUTO-ENTMCNC: 32.1 G/DL (ref 31–36)
MCV RBC AUTO: 87.3 FL (ref 80–100)
MONOCYTES # BLD: 1.2 K/UL (ref 0–1.3)
MONOCYTES NFR BLD: 9.2 %
NEUTROPHILS # BLD: 10.1 K/UL (ref 1.7–7.7)
NEUTROPHILS NFR BLD: 77.4 %
PHOSPHATE SERPL-MCNC: 3.5 MG/DL (ref 2.5–4.9)
PLATELET # BLD AUTO: 677 K/UL (ref 135–450)
PMV BLD AUTO: 7.2 FL (ref 5–10.5)
POTASSIUM SERPL-SCNC: 4.5 MMOL/L (ref 3.5–5.1)
PROT SERPL-MCNC: 7.7 G/DL (ref 6.4–8.2)
RBC # BLD AUTO: 4.38 M/UL (ref 4.2–5.9)
SODIUM SERPL-SCNC: 140 MMOL/L (ref 136–145)
TIBC SERPL-MCNC: 131 UG/DL (ref 260–445)
WBC # BLD AUTO: 13.1 K/UL (ref 4–11)

## 2024-10-05 PROCEDURE — 82947 ASSAY GLUCOSE BLOOD QUANT: CPT

## 2024-10-05 PROCEDURE — 82040 ASSAY OF SERUM ALBUMIN: CPT

## 2024-10-05 PROCEDURE — 84520 ASSAY OF UREA NITROGEN: CPT

## 2024-10-05 PROCEDURE — 87070 CULTURE OTHR SPECIMN AEROBIC: CPT

## 2024-10-05 PROCEDURE — 87205 SMEAR GRAM STAIN: CPT

## 2024-10-05 PROCEDURE — 86403 PARTICLE AGGLUT ANTBDY SCRN: CPT

## 2024-10-05 PROCEDURE — 87077 CULTURE AEROBIC IDENTIFY: CPT

## 2024-10-05 PROCEDURE — 2580000003 HC RX 258: Performed by: INTERNAL MEDICINE

## 2024-10-05 PROCEDURE — 82435 ASSAY OF BLOOD CHLORIDE: CPT

## 2024-10-05 PROCEDURE — 80076 HEPATIC FUNCTION PANEL: CPT

## 2024-10-05 PROCEDURE — 36415 COLL VENOUS BLD VENIPUNCTURE: CPT

## 2024-10-05 PROCEDURE — 83735 ASSAY OF MAGNESIUM: CPT

## 2024-10-05 PROCEDURE — 84100 ASSAY OF PHOSPHORUS: CPT

## 2024-10-05 PROCEDURE — 1200000000 HC SEMI PRIVATE

## 2024-10-05 PROCEDURE — 80048 BASIC METABOLIC PNL TOTAL CA: CPT

## 2024-10-05 PROCEDURE — 97110 THERAPEUTIC EXERCISES: CPT

## 2024-10-05 PROCEDURE — 97535 SELF CARE MNGMENT TRAINING: CPT

## 2024-10-05 PROCEDURE — 85025 COMPLETE CBC W/AUTO DIFF WBC: CPT

## 2024-10-05 PROCEDURE — 87186 SC STD MICRODIL/AGAR DIL: CPT

## 2024-10-05 PROCEDURE — 82310 ASSAY OF CALCIUM: CPT

## 2024-10-05 PROCEDURE — 6370000000 HC RX 637 (ALT 250 FOR IP): Performed by: INTERNAL MEDICINE

## 2024-10-05 PROCEDURE — 82565 ASSAY OF CREATININE: CPT

## 2024-10-05 PROCEDURE — 82374 ASSAY BLOOD CARBON DIOXIDE: CPT

## 2024-10-05 PROCEDURE — 84295 ASSAY OF SERUM SODIUM: CPT

## 2024-10-05 PROCEDURE — 6360000002 HC RX W HCPCS: Performed by: INTERNAL MEDICINE

## 2024-10-05 RX ADMIN — NIFEDIPINE 90 MG: 30 TABLET, FILM COATED, EXTENDED RELEASE ORAL at 08:09

## 2024-10-05 RX ADMIN — OLANZAPINE 10 MG: 5 TABLET, FILM COATED ORAL at 20:34

## 2024-10-05 RX ADMIN — CEFEPIME 1000 MG: 1 INJECTION, POWDER, FOR SOLUTION INTRAMUSCULAR; INTRAVENOUS at 16:43

## 2024-10-05 RX ADMIN — Medication 1000 UNITS: at 08:09

## 2024-10-05 RX ADMIN — TAMSULOSIN HYDROCHLORIDE 0.4 MG: 0.4 CAPSULE ORAL at 08:08

## 2024-10-05 RX ADMIN — CARVEDILOL 6.25 MG: 6.25 TABLET, FILM COATED ORAL at 16:43

## 2024-10-05 RX ADMIN — Medication 10 ML: at 08:09

## 2024-10-05 RX ADMIN — Medication 10 ML: at 20:35

## 2024-10-05 RX ADMIN — ACETAMINOPHEN 650 MG: 325 TABLET ORAL at 14:02

## 2024-10-05 RX ADMIN — OLANZAPINE 5 MG: 5 TABLET, FILM COATED ORAL at 08:08

## 2024-10-05 RX ADMIN — CARVEDILOL 6.25 MG: 6.25 TABLET, FILM COATED ORAL at 08:09

## 2024-10-05 ASSESSMENT — PAIN SCALES - GENERAL
PAINLEVEL_OUTOF10: 0
PAINLEVEL_OUTOF10: 5

## 2024-10-05 ASSESSMENT — PAIN DESCRIPTION - LOCATION: LOCATION: LEG

## 2024-10-05 ASSESSMENT — PAIN DESCRIPTION - ORIENTATION: ORIENTATION: LEFT

## 2024-10-05 ASSESSMENT — PAIN DESCRIPTION - DESCRIPTORS: DESCRIPTORS: ACHING;THROBBING

## 2024-10-05 NOTE — CARE COORDINATION
10/05/24 1606   Readmission Assessment   Number of Days since last admission? 8-30 days   Previous Disposition Home with Family  (and [facility])   Who is being Interviewed Patient   What was the patient's/caregiver's perception as to why they think they needed to return back to the hospital? Other (Comment)  (Swelling in lower leg.)   Did you visit your Primary Care Physician after you left the hospital, before you returned this time? Yes   Did you see a specialist, such as Cardiac, Pulmonary, Orthopedic Physician, etc. after you left the hospital? No   Who advised the patient to return to the hospital? Self-referral   Does the patient report anything that got in the way of taking their medications? No   In our efforts to provide the best possible care to you and others like you, can you think of anything that we could have done to help you after you left the hospital the first time, so that you might not have needed to return so soon? Other (Comment)  (Nothing else, patient states he had an injury that brought him back.)     Electronically signed by SHAWANDA Mercado on 10/5/24 at 4:07 PM EDT

## 2024-10-05 NOTE — DISCHARGE INSTR - COC
1628.   Colostomy/Ileostomy/Ileal Conduit: No       Date of Last BM: ***    Intake/Output Summary (Last 24 hours) at 10/5/2024 1602  Last data filed at 10/5/2024 0809  Gross per 24 hour   Intake 1201.76 ml   Output 2500 ml   Net -1298.24 ml     I/O last 3 completed shifts:  In: 1754.2 [I.V.:1665.1; IV Piggyback:89]  Out: 3700 [Urine:3700]    Safety Concerns:     At Risk for Falls    Impairments/Disabilities:      None    Nutrition Therapy:  Current Nutrition Therapy:   - Oral Diet:  General    Routes of Feeding: Oral  Liquids: No Restrictions  Daily Fluid Restriction: no  Last Modified Barium Swallow with Video (Video Swallowing Test): not done    Treatments at the Time of Hospital Discharge:   Respiratory Treatments: ***  Oxygen Therapy:  is not on home oxygen therapy.  Ventilator:    - No ventilator support    Rehab Therapies: Physical Therapy and Occupational Therapy  Weight Bearing Status/Restrictions: No weight bearing restrictions; ROM ankle and knee as tolerated  Other Medical Equipment (for information only, NOT a DME order):  walker  Other Treatments: Once daily dry dressing to be changed on the left lower leg.     Patient's personal belongings (please select all that are sent with patient):  Earl    RN SIGNATURE:  Electronically signed by Babs Schwab RN on 10/8/24 at 12:58 PM EDT    CASE MANAGEMENT/SOCIAL WORK SECTION    Inpatient Status Date: 10/1/24    Readmission Risk Assessment Score:  Readmission Risk              Risk of Unplanned Readmission:  22           Discharging to Facility/ Agency   [facility]   (Aka: [facility])  [address] [place] [address]  Phone: [phone]  Fax: [phone]         / signature: Electronically signed by SHAWANDA Mercado on 10/5/24 at 4:03 PM EDT    PHYSICIAN SECTION    Prognosis: Fair    Condition at Discharge: Stable    Rehab Potential (if transferring to Rehab): Fair    Recommended Labs or Other Treatments

## 2024-10-05 NOTE — CARE COORDINATION
Case Management Assessment  Initial Evaluation    Date/Time of Evaluation: 10/5/2024 3:51 PM  Assessment Completed by: SHAWANDA Mercado    If patient is discharged prior to next notation, then this note serves as note for discharge by case management.    Patient Name: Tye Resendez                   YOB: 1950  Diagnosis: Hyperglycemia [R73.9]  Soft tissue mass [M79.89]  Cellulitis of left lower extremity [L03.116]  Abnormal computed tomography of lower extremity [R93.6]  Chronic kidney disease, unspecified CKD stage [N18.9]                   Date / Time: 10/1/2024 12:01 PM    Patient Admission Status: Inpatient   Readmission Risk (Low < 19, Mod (19-27), High > 27): Readmission Risk Score: 18.9    Current PCP: Chris Persaud MD  PCP verified by CM? Yes    Chart Reviewed: Yes      History Provided by: Patient  Patient Orientation: Alert and Oriented, Person, Place, Situation    Patient Cognition: Alert    Hospitalization in the last 30 days (Readmission):  No    If yes, Readmission Assessment in  Navigator will be completed.    Advance Directives:      Code Status: Full Code   Patient's Primary Decision Maker is: Legal Next of Kin      Discharge Planning:    Patient lives with: Spouse/Significant Other Type of Home: Assisted living  Primary Care Giver: Self  Patient Support Systems include: Spouse/Significant Other   Current Financial resources: Medicare, [place] ([facility])  Current community resources: Other (Comment) ([facility])  Current services prior to admission: None            Current DME:              Type of Home Care services:  None    ADLS  Prior functional level: Independent in ADLs/IADLs  Current functional level: Other (see comment) (TBD pending PT and OT evaluations)    PT AM-PAC: 18 /24  OT AM-PAC: 18 /24    Family can provide assistance at DC: Yes  Would you like Case Management to discuss the discharge plan with any other family members/significant others, and if so, who?

## 2024-10-06 ENCOUNTER — ANESTHESIA EVENT (OUTPATIENT)
Dept: OPERATING ROOM | Age: 74
End: 2024-10-06
Payer: MEDICARE

## 2024-10-06 ENCOUNTER — ANESTHESIA (OUTPATIENT)
Dept: OPERATING ROOM | Age: 74
End: 2024-10-06
Payer: MEDICARE

## 2024-10-06 PROBLEM — R22.42 LEG MASS, LEFT: Status: ACTIVE | Noted: 2024-10-06

## 2024-10-06 PROBLEM — L02.416 ABSCESS OF LEFT LEG: Status: ACTIVE | Noted: 2024-10-06

## 2024-10-06 LAB
ALBUMIN SERPL-MCNC: 2.9 G/DL (ref 3.4–5)
ANION GAP SERPL CALCULATED.3IONS-SCNC: 16 MMOL/L (ref 3–16)
BACTERIA SPEC AEROBE CULT: NORMAL
BASOPHILS # BLD: 0.1 K/UL (ref 0–0.2)
BASOPHILS NFR BLD: 0.6 %
BUN SERPL-MCNC: 40 MG/DL (ref 7–20)
CALCIUM SERPL-MCNC: 9.7 MG/DL (ref 8.3–10.6)
CHLORIDE SERPL-SCNC: 103 MMOL/L (ref 99–110)
CO2 SERPL-SCNC: 20 MMOL/L (ref 21–32)
CREAT SERPL-MCNC: 2.7 MG/DL (ref 0.8–1.3)
DEPRECATED RDW RBC AUTO: 15.8 % (ref 12.4–15.4)
EOSINOPHIL # BLD: 0.1 K/UL (ref 0–0.6)
EOSINOPHIL NFR BLD: 0.8 %
GFR SERPLBLD CREATININE-BSD FMLA CKD-EPI: 24 ML/MIN/{1.73_M2}
GLUCOSE SERPL-MCNC: 108 MG/DL (ref 70–99)
GRAM STN SPEC: NORMAL
HCT VFR BLD AUTO: 37.9 % (ref 40.5–52.5)
HGB BLD-MCNC: 12.5 G/DL (ref 13.5–17.5)
LYMPHOCYTES # BLD: 1.7 K/UL (ref 1–5.1)
LYMPHOCYTES NFR BLD: 14.3 %
MCH RBC QN AUTO: 28.8 PG (ref 26–34)
MCHC RBC AUTO-ENTMCNC: 33.1 G/DL (ref 31–36)
MCV RBC AUTO: 87.2 FL (ref 80–100)
MONOCYTES # BLD: 1.2 K/UL (ref 0–1.3)
MONOCYTES NFR BLD: 10 %
NEUTROPHILS # BLD: 8.8 K/UL (ref 1.7–7.7)
NEUTROPHILS NFR BLD: 74.3 %
PHOSPHATE SERPL-MCNC: 4.2 MG/DL (ref 2.5–4.9)
PLATELET # BLD AUTO: 646 K/UL (ref 135–450)
PMV BLD AUTO: 7 FL (ref 5–10.5)
POTASSIUM SERPL-SCNC: 4.8 MMOL/L (ref 3.5–5.1)
RBC # BLD AUTO: 4.35 M/UL (ref 4.2–5.9)
SODIUM SERPL-SCNC: 139 MMOL/L (ref 136–145)
WBC # BLD AUTO: 11.8 K/UL (ref 4–11)

## 2024-10-06 PROCEDURE — 82040 ASSAY OF SERUM ALBUMIN: CPT

## 2024-10-06 PROCEDURE — 6370000000 HC RX 637 (ALT 250 FOR IP): Performed by: ORTHOPAEDIC SURGERY

## 2024-10-06 PROCEDURE — 7100000000 HC PACU RECOVERY - FIRST 15 MIN: Performed by: ORTHOPAEDIC SURGERY

## 2024-10-06 PROCEDURE — 82310 ASSAY OF CALCIUM: CPT

## 2024-10-06 PROCEDURE — 88311 DECALCIFY TISSUE: CPT

## 2024-10-06 PROCEDURE — 3700000000 HC ANESTHESIA ATTENDED CARE: Performed by: ORTHOPAEDIC SURGERY

## 2024-10-06 PROCEDURE — 87070 CULTURE OTHR SPECIMN AEROBIC: CPT

## 2024-10-06 PROCEDURE — 2580000003 HC RX 258: Performed by: ORTHOPAEDIC SURGERY

## 2024-10-06 PROCEDURE — 7100000001 HC PACU RECOVERY - ADDTL 15 MIN: Performed by: ORTHOPAEDIC SURGERY

## 2024-10-06 PROCEDURE — 6370000000 HC RX 637 (ALT 250 FOR IP): Performed by: INTERNAL MEDICINE

## 2024-10-06 PROCEDURE — 87075 CULTR BACTERIA EXCEPT BLOOD: CPT

## 2024-10-06 PROCEDURE — 6360000002 HC RX W HCPCS: Performed by: ANESTHESIOLOGY

## 2024-10-06 PROCEDURE — 2709999900 HC NON-CHARGEABLE SUPPLY: Performed by: ORTHOPAEDIC SURGERY

## 2024-10-06 PROCEDURE — 3700000001 HC ADD 15 MINUTES (ANESTHESIA): Performed by: ORTHOPAEDIC SURGERY

## 2024-10-06 PROCEDURE — 0JBP0ZX EXCISION OF LEFT LOWER LEG SUBCUTANEOUS TISSUE AND FASCIA, OPEN APPROACH, DIAGNOSTIC: ICD-10-PCS | Performed by: ORTHOPAEDIC SURGERY

## 2024-10-06 PROCEDURE — 36415 COLL VENOUS BLD VENIPUNCTURE: CPT

## 2024-10-06 PROCEDURE — 80048 BASIC METABOLIC PNL TOTAL CA: CPT

## 2024-10-06 PROCEDURE — 2500000003 HC RX 250 WO HCPCS: Performed by: ANESTHESIOLOGY

## 2024-10-06 PROCEDURE — 3600000012 HC SURGERY LEVEL 2 ADDTL 15MIN: Performed by: ORTHOPAEDIC SURGERY

## 2024-10-06 PROCEDURE — 85025 COMPLETE CBC W/AUTO DIFF WBC: CPT

## 2024-10-06 PROCEDURE — 6360000002 HC RX W HCPCS: Performed by: ORTHOPAEDIC SURGERY

## 2024-10-06 PROCEDURE — 3600000002 HC SURGERY LEVEL 2 BASE: Performed by: ORTHOPAEDIC SURGERY

## 2024-10-06 PROCEDURE — 2580000003 HC RX 258: Performed by: INTERNAL MEDICINE

## 2024-10-06 PROCEDURE — 84295 ASSAY OF SERUM SODIUM: CPT

## 2024-10-06 PROCEDURE — 1200000000 HC SEMI PRIVATE

## 2024-10-06 PROCEDURE — 82565 ASSAY OF CREATININE: CPT

## 2024-10-06 PROCEDURE — 88305 TISSUE EXAM BY PATHOLOGIST: CPT

## 2024-10-06 PROCEDURE — 84100 ASSAY OF PHOSPHORUS: CPT

## 2024-10-06 PROCEDURE — 88333 PATH CONSLTJ SURG CYTO XM 1: CPT

## 2024-10-06 PROCEDURE — 82374 ASSAY BLOOD CARBON DIOXIDE: CPT

## 2024-10-06 PROCEDURE — 88313 SPECIAL STAINS GROUP 2: CPT

## 2024-10-06 PROCEDURE — 84520 ASSAY OF UREA NITROGEN: CPT

## 2024-10-06 PROCEDURE — 0Y9J0ZZ DRAINAGE OF LEFT LOWER LEG, OPEN APPROACH: ICD-10-PCS | Performed by: ORTHOPAEDIC SURGERY

## 2024-10-06 PROCEDURE — 82435 ASSAY OF BLOOD CHLORIDE: CPT

## 2024-10-06 PROCEDURE — 82947 ASSAY GLUCOSE BLOOD QUANT: CPT

## 2024-10-06 PROCEDURE — 87205 SMEAR GRAM STAIN: CPT

## 2024-10-06 RX ORDER — FENTANYL CITRATE 50 UG/ML
25 INJECTION, SOLUTION INTRAMUSCULAR; INTRAVENOUS EVERY 5 MIN PRN
Status: DISCONTINUED | OUTPATIENT
Start: 2024-10-06 | End: 2024-10-06 | Stop reason: HOSPADM

## 2024-10-06 RX ORDER — ASPIRIN 325 MG
325 TABLET, DELAYED RELEASE (ENTERIC COATED) ORAL 2 TIMES DAILY
Status: DISCONTINUED | OUTPATIENT
Start: 2024-10-06 | End: 2024-10-08 | Stop reason: HOSPADM

## 2024-10-06 RX ORDER — SODIUM CHLORIDE 0.9 % (FLUSH) 0.9 %
5-40 SYRINGE (ML) INJECTION EVERY 12 HOURS SCHEDULED
Status: DISCONTINUED | OUTPATIENT
Start: 2024-10-06 | End: 2024-10-08 | Stop reason: HOSPADM

## 2024-10-06 RX ORDER — SODIUM CHLORIDE 450 MG/100ML
INJECTION, SOLUTION INTRAVENOUS CONTINUOUS
Status: DISCONTINUED | OUTPATIENT
Start: 2024-10-06 | End: 2024-10-07

## 2024-10-06 RX ORDER — PROPOFOL 10 MG/ML
INJECTION, EMULSION INTRAVENOUS
Status: DISCONTINUED | OUTPATIENT
Start: 2024-10-06 | End: 2024-10-06 | Stop reason: SDUPTHER

## 2024-10-06 RX ORDER — SODIUM CHLORIDE 0.9 % (FLUSH) 0.9 %
5-40 SYRINGE (ML) INJECTION PRN
Status: DISCONTINUED | OUTPATIENT
Start: 2024-10-06 | End: 2024-10-08 | Stop reason: HOSPADM

## 2024-10-06 RX ORDER — SODIUM CHLORIDE 9 MG/ML
INJECTION, SOLUTION INTRAVENOUS CONTINUOUS
Status: DISCONTINUED | OUTPATIENT
Start: 2024-10-06 | End: 2024-10-07

## 2024-10-06 RX ORDER — HYDROMORPHONE HYDROCHLORIDE 2 MG/ML
0.5 INJECTION, SOLUTION INTRAMUSCULAR; INTRAVENOUS; SUBCUTANEOUS EVERY 5 MIN PRN
Status: DISCONTINUED | OUTPATIENT
Start: 2024-10-06 | End: 2024-10-06 | Stop reason: HOSPADM

## 2024-10-06 RX ORDER — DIPHENHYDRAMINE HYDROCHLORIDE 50 MG/ML
12.5 INJECTION INTRAMUSCULAR; INTRAVENOUS
Status: DISCONTINUED | OUTPATIENT
Start: 2024-10-06 | End: 2024-10-06 | Stop reason: HOSPADM

## 2024-10-06 RX ORDER — OXYCODONE HYDROCHLORIDE 5 MG/1
5 TABLET ORAL
Status: DISCONTINUED | OUTPATIENT
Start: 2024-10-06 | End: 2024-10-06 | Stop reason: HOSPADM

## 2024-10-06 RX ORDER — PROCHLORPERAZINE EDISYLATE 5 MG/ML
5 INJECTION INTRAMUSCULAR; INTRAVENOUS
Status: DISCONTINUED | OUTPATIENT
Start: 2024-10-06 | End: 2024-10-06 | Stop reason: HOSPADM

## 2024-10-06 RX ORDER — SODIUM CHLORIDE 9 MG/ML
INJECTION, SOLUTION INTRAVENOUS PRN
Status: DISCONTINUED | OUTPATIENT
Start: 2024-10-06 | End: 2024-10-06 | Stop reason: HOSPADM

## 2024-10-06 RX ORDER — ONDANSETRON 2 MG/ML
INJECTION INTRAMUSCULAR; INTRAVENOUS
Status: DISCONTINUED | OUTPATIENT
Start: 2024-10-06 | End: 2024-10-06 | Stop reason: SDUPTHER

## 2024-10-06 RX ORDER — IPRATROPIUM BROMIDE AND ALBUTEROL SULFATE 2.5; .5 MG/3ML; MG/3ML
1 SOLUTION RESPIRATORY (INHALATION)
Status: DISCONTINUED | OUTPATIENT
Start: 2024-10-06 | End: 2024-10-06 | Stop reason: HOSPADM

## 2024-10-06 RX ORDER — SODIUM CHLORIDE 0.9 % (FLUSH) 0.9 %
5-40 SYRINGE (ML) INJECTION PRN
Status: DISCONTINUED | OUTPATIENT
Start: 2024-10-06 | End: 2024-10-06 | Stop reason: HOSPADM

## 2024-10-06 RX ORDER — LIDOCAINE HYDROCHLORIDE 20 MG/ML
INJECTION, SOLUTION EPIDURAL; INFILTRATION; INTRACAUDAL; PERINEURAL
Status: DISCONTINUED | OUTPATIENT
Start: 2024-10-06 | End: 2024-10-06 | Stop reason: SDUPTHER

## 2024-10-06 RX ORDER — SODIUM CHLORIDE 9 MG/ML
INJECTION, SOLUTION INTRAVENOUS PRN
Status: DISCONTINUED | OUTPATIENT
Start: 2024-10-06 | End: 2024-10-08 | Stop reason: HOSPADM

## 2024-10-06 RX ORDER — ONDANSETRON 2 MG/ML
4 INJECTION INTRAMUSCULAR; INTRAVENOUS
Status: DISCONTINUED | OUTPATIENT
Start: 2024-10-06 | End: 2024-10-06 | Stop reason: HOSPADM

## 2024-10-06 RX ORDER — HYDRALAZINE HYDROCHLORIDE 20 MG/ML
5 INJECTION INTRAMUSCULAR; INTRAVENOUS EVERY 4 HOURS PRN
Status: DISCONTINUED | OUTPATIENT
Start: 2024-10-06 | End: 2024-10-08 | Stop reason: HOSPADM

## 2024-10-06 RX ORDER — LORAZEPAM 2 MG/ML
0.5 INJECTION INTRAMUSCULAR
Status: DISCONTINUED | OUTPATIENT
Start: 2024-10-06 | End: 2024-10-06 | Stop reason: HOSPADM

## 2024-10-06 RX ORDER — LABETALOL HYDROCHLORIDE 5 MG/ML
10 INJECTION, SOLUTION INTRAVENOUS
Status: DISCONTINUED | OUTPATIENT
Start: 2024-10-06 | End: 2024-10-06 | Stop reason: HOSPADM

## 2024-10-06 RX ORDER — HYDROMORPHONE HYDROCHLORIDE 1 MG/ML
0.5 INJECTION, SOLUTION INTRAMUSCULAR; INTRAVENOUS; SUBCUTANEOUS
Status: DISCONTINUED | OUTPATIENT
Start: 2024-10-06 | End: 2024-10-08 | Stop reason: HOSPADM

## 2024-10-06 RX ORDER — SODIUM CHLORIDE 0.9 % (FLUSH) 0.9 %
5-40 SYRINGE (ML) INJECTION EVERY 12 HOURS SCHEDULED
Status: DISCONTINUED | OUTPATIENT
Start: 2024-10-06 | End: 2024-10-06 | Stop reason: HOSPADM

## 2024-10-06 RX ORDER — DEXAMETHASONE SODIUM PHOSPHATE 4 MG/ML
INJECTION, SOLUTION INTRA-ARTICULAR; INTRALESIONAL; INTRAMUSCULAR; INTRAVENOUS; SOFT TISSUE
Status: DISCONTINUED | OUTPATIENT
Start: 2024-10-06 | End: 2024-10-06 | Stop reason: SDUPTHER

## 2024-10-06 RX ORDER — NALOXONE HYDROCHLORIDE 0.4 MG/ML
INJECTION, SOLUTION INTRAMUSCULAR; INTRAVENOUS; SUBCUTANEOUS PRN
Status: DISCONTINUED | OUTPATIENT
Start: 2024-10-06 | End: 2024-10-06 | Stop reason: HOSPADM

## 2024-10-06 RX ADMIN — ONDANSETRON 4 MG: 2 INJECTION INTRAMUSCULAR; INTRAVENOUS at 14:29

## 2024-10-06 RX ADMIN — Medication 10 ML: at 20:21

## 2024-10-06 RX ADMIN — Medication 10 ML: at 08:24

## 2024-10-06 RX ADMIN — PROPOFOL 150 MG: 10 INJECTION, EMULSION INTRAVENOUS at 14:18

## 2024-10-06 RX ADMIN — CARVEDILOL 6.25 MG: 6.25 TABLET, FILM COATED ORAL at 17:54

## 2024-10-06 RX ADMIN — ACETAMINOPHEN 650 MG: 325 TABLET ORAL at 08:21

## 2024-10-06 RX ADMIN — LIDOCAINE HYDROCHLORIDE 100 MG: 20 INJECTION, SOLUTION EPIDURAL; INFILTRATION; INTRACAUDAL; PERINEURAL at 14:15

## 2024-10-06 RX ADMIN — OLANZAPINE 10 MG: 5 TABLET, FILM COATED ORAL at 20:21

## 2024-10-06 RX ADMIN — CEFEPIME 1000 MG: 1 INJECTION, POWDER, FOR SOLUTION INTRAMUSCULAR; INTRAVENOUS at 16:05

## 2024-10-06 RX ADMIN — TAMSULOSIN HYDROCHLORIDE 0.4 MG: 0.4 CAPSULE ORAL at 08:21

## 2024-10-06 RX ADMIN — DEXAMETHASONE SODIUM PHOSPHATE 4 MG: 4 INJECTION, SOLUTION INTRAMUSCULAR; INTRAVENOUS at 14:29

## 2024-10-06 RX ADMIN — CARVEDILOL 6.25 MG: 6.25 TABLET, FILM COATED ORAL at 08:21

## 2024-10-06 RX ADMIN — OLANZAPINE 5 MG: 5 TABLET, FILM COATED ORAL at 08:21

## 2024-10-06 RX ADMIN — PROPOFOL 30 MG: 10 INJECTION, EMULSION INTRAVENOUS at 14:21

## 2024-10-06 RX ADMIN — NIFEDIPINE 90 MG: 30 TABLET, FILM COATED, EXTENDED RELEASE ORAL at 08:21

## 2024-10-06 RX ADMIN — ACETAMINOPHEN 650 MG: 325 TABLET ORAL at 21:24

## 2024-10-06 RX ADMIN — ASPIRIN 325 MG: 325 TABLET, COATED ORAL at 20:21

## 2024-10-06 RX ADMIN — SODIUM CHLORIDE: 9 INJECTION, SOLUTION INTRAVENOUS at 12:16

## 2024-10-06 RX ADMIN — Medication 25 MG: at 14:28

## 2024-10-06 RX ADMIN — SODIUM CHLORIDE: 4.5 INJECTION, SOLUTION INTRAVENOUS at 15:59

## 2024-10-06 RX ADMIN — Medication 1000 UNITS: at 08:21

## 2024-10-06 ASSESSMENT — PAIN DESCRIPTION - ORIENTATION
ORIENTATION: LEFT
ORIENTATION: LEFT

## 2024-10-06 ASSESSMENT — PAIN SCALES - GENERAL
PAINLEVEL_OUTOF10: 7
PAINLEVEL_OUTOF10: 3
PAINLEVEL_OUTOF10: 5

## 2024-10-06 ASSESSMENT — PAIN DESCRIPTION - LOCATION
LOCATION: LEG
LOCATION: LEG

## 2024-10-06 ASSESSMENT — PAIN DESCRIPTION - DESCRIPTORS
DESCRIPTORS: BURNING;ACHING;DISCOMFORT
DESCRIPTORS: ACHING;THROBBING

## 2024-10-06 NOTE — OP NOTE
[address] [address]                            OPERATIVE REPORT      PATIENT NAME: KENDRA GARCIA                 : 1950  MED REC NO: [id number]                      ROOM: [address] [place] [address]  ACCOUNT NO: [id number]                       ADMIT DATE: 10/01/2024  PROVIDER: Edd Lorenzo MD      DATE OF PROCEDURE:  10/06/2024    SURGEON:  Edd Lorenzo MD    ASSISTANT:  Prudence surgical assistant.    PREOPERATIVE DIAGNOSES:    1. Left leg deep mass.   2. Left leg deep abscess.    POSTOPERATIVE DIAGNOSES:    1. Left leg deep mass.   2. Left leg deep abscess.    PROCEDURE DONE:    1. Excision, tumor/large mass, left leg deep subcutaneous tissue.  2. Incision and drainage, left leg deep abscess.    ANESTHESIA:  General anesthesia.    ESTIMATED BLOOD LOSS:  Less than 50 mL.    COMPLICATIONS:  None.    SPECIMENS:    1. Swab for culture and sensitivity for Gram stain and culture and sensitivity.  2. Deep mass, left leg, for histopathology.    INDICATIONS:  This is a 73-year-old white male with a past medical history for left leg injuries over 50 years ago.  He had a left knee fusion including skin graft on the left leg.  He presented to [facility] with a week history of increasing pain and swelling on the left leg.  He was admitted to the hospital with Orthopedic consultation.  He had a CT scan that showed a large calcified mass in the lower leg.  His MRI also confirmed a large mass.  He was continued on IV antibiotic.  He started having drainage, and we were called again about his drainage.  We made a decision to proceed with incision and drainage of his left leg and excision of the mass.  All risks, benefits, and alternatives were discussed with the patient, and he agreed to proceed with surgical treatment.    Given the patient's Body mass index is 31.32 kg/m². added significant challenge to the procedure. It required significant physical

## 2024-10-06 NOTE — BRIEF OP NOTE
Brief Postoperative Note      Patient: Tye Resendez  YOB: 1950  MRN: [id number]    Date of Procedure: 10/6/2024    Pre-Op Diagnosis Codes:      * Abscess of left leg [L02.416], Soft tissue mass.    Post-Op Diagnosis: Same       Procedure(s):  LEFT LEG DEBRIDEMENT INCISION AND DRAINAGE, BIOPSY AND EXCISION LEFT LEG DEEP MASS.    Surgeon(s):  Edd Lorenzo MD    Assistant:  Surgical Assistant: Prudence Blackburn    Anesthesia: General    Estimated Blood Loss (mL): less than 50     Complications: None    Specimens:   ID Type Source Tests Collected by Time Destination   1 : FLUID LEFT LOWER LEG ABSCESS Body Fluid Fluid CULTURE, SURGICAL Edd Lorenzo MD 10/6/2024 1444    2 : FLUID LEFT LOWER LEG FOR CRYSTALS Joint/Joint Fluid Joint Fluid CRYSTALS, BODY FLUID Edd Lorenzo MD 10/6/2024 1448    A : BIOPSY LEFT LOWER LEG TISSUE Tissue Tissue SURGICAL PATHOLOGY Edd Lorenzo MD 10/6/2024 1445        Implants:  * No implants in log *      Drains:   Urinary Catheter 10/01/24 Guerra (Active)   $ Urethral catheter insertion $ Not inserted for procedure 10/01/24 1904   Catheter Indications Urinary retention (acute or chronic), continuous bladder irrigation or bladder outlet obstruction 10/06/24 0813   Site Assessment Pink;Moist;No urethral drainage 10/06/24 0813   Urine Color Yellow 10/06/24 0813   Urine Appearance Clear 10/06/24 0813   Urine Odor Malodorous 10/06/24 0813   Collection Container Standard 10/06/24 0813   Securement Method Securing device (Describe) 10/06/24 0813   Catheter Care  Perineal wipes 10/06/24 0813   Catheter Best Practices  Drainage tube clipped to bed;Catheter secured to thigh;Tamper seal intact;Bag below bladder;Bag not on floor;Lack of dependent loop in tubing;Drainage bag less than half full 10/06/24 0813   Status Draining;Patent 10/06/24 0813   Output (mL) 1100 mL 10/06/24 0332       [REMOVED] Urinary Catheter 08/31/24 Coude (Removed)   $ Urethral catheter insertion $ Not

## 2024-10-07 ENCOUNTER — APPOINTMENT (OUTPATIENT)
Dept: MRI IMAGING | Age: 74
DRG: 602 | End: 2024-10-07
Payer: MEDICARE

## 2024-10-07 LAB
ALBUMIN SERPL-MCNC: 3.1 G/DL (ref 3.4–5)
ALBUMIN SERPL-MCNC: 3.1 G/DL (ref 3.4–5)
ALP SERPL-CCNC: 166 U/L (ref 40–129)
ALT SERPL-CCNC: 110 U/L (ref 10–40)
ANION GAP SERPL CALCULATED.3IONS-SCNC: 13 MMOL/L (ref 3–16)
AST SERPL-CCNC: 46 U/L (ref 15–37)
BACTERIA SPEC AEROBE CULT: ABNORMAL
BILIRUB DIRECT SERPL-MCNC: 0.2 MG/DL (ref 0–0.3)
BILIRUB INDIRECT SERPL-MCNC: 0.1 MG/DL (ref 0–1)
BILIRUB SERPL-MCNC: 0.3 MG/DL (ref 0–1)
BUN SERPL-MCNC: 43 MG/DL (ref 7–20)
CALCIUM SERPL-MCNC: 9.2 MG/DL (ref 8.3–10.6)
CHLORIDE SERPL-SCNC: 105 MMOL/L (ref 99–110)
CO2 SERPL-SCNC: 21 MMOL/L (ref 21–32)
CREAT SERPL-MCNC: 2.3 MG/DL (ref 0.8–1.3)
GFR SERPLBLD CREATININE-BSD FMLA CKD-EPI: 29 ML/MIN/{1.73_M2}
GLUCOSE SERPL-MCNC: 122 MG/DL (ref 70–99)
GRAM STN SPEC: ABNORMAL
HCT VFR BLD AUTO: 32.8 % (ref 40.5–52.5)
HGB BLD-MCNC: 11 G/DL (ref 13.5–17.5)
ORGANISM: ABNORMAL
PHOSPHATE SERPL-MCNC: 4.2 MG/DL (ref 2.5–4.9)
POTASSIUM SERPL-SCNC: 4.7 MMOL/L (ref 3.5–5.1)
PROT SERPL-MCNC: 7.6 G/DL (ref 6.4–8.2)
SODIUM SERPL-SCNC: 139 MMOL/L (ref 136–145)

## 2024-10-07 PROCEDURE — APPNB45 APP NON BILLABLE 31-45 MINUTES: Performed by: NURSE PRACTITIONER

## 2024-10-07 PROCEDURE — 36415 COLL VENOUS BLD VENIPUNCTURE: CPT

## 2024-10-07 PROCEDURE — 1200000000 HC SEMI PRIVATE

## 2024-10-07 PROCEDURE — 80076 HEPATIC FUNCTION PANEL: CPT

## 2024-10-07 PROCEDURE — 97530 THERAPEUTIC ACTIVITIES: CPT

## 2024-10-07 PROCEDURE — 2580000003 HC RX 258: Performed by: INTERNAL MEDICINE

## 2024-10-07 PROCEDURE — 85018 HEMOGLOBIN: CPT

## 2024-10-07 PROCEDURE — 74183 MRI ABD W/O CNTR FLWD CNTR: CPT

## 2024-10-07 PROCEDURE — 2580000003 HC RX 258: Performed by: ORTHOPAEDIC SURGERY

## 2024-10-07 PROCEDURE — A9577 INJ MULTIHANCE: HCPCS | Performed by: INTERNAL MEDICINE

## 2024-10-07 PROCEDURE — 6360000004 HC RX CONTRAST MEDICATION: Performed by: INTERNAL MEDICINE

## 2024-10-07 PROCEDURE — 99024 POSTOP FOLLOW-UP VISIT: CPT | Performed by: NURSE PRACTITIONER

## 2024-10-07 PROCEDURE — 6360000002 HC RX W HCPCS: Performed by: ORTHOPAEDIC SURGERY

## 2024-10-07 PROCEDURE — 6360000002 HC RX W HCPCS: Performed by: NURSE PRACTITIONER

## 2024-10-07 PROCEDURE — 6370000000 HC RX 637 (ALT 250 FOR IP): Performed by: ORTHOPAEDIC SURGERY

## 2024-10-07 PROCEDURE — 80069 RENAL FUNCTION PANEL: CPT

## 2024-10-07 PROCEDURE — 85014 HEMATOCRIT: CPT

## 2024-10-07 PROCEDURE — 97116 GAIT TRAINING THERAPY: CPT

## 2024-10-07 RX ORDER — HYDROCODONE BITARTRATE AND ACETAMINOPHEN 5; 325 MG/1; MG/1
1 TABLET ORAL EVERY 4 HOURS PRN
Status: DISCONTINUED | OUTPATIENT
Start: 2024-10-07 | End: 2024-10-08 | Stop reason: HOSPADM

## 2024-10-07 RX ORDER — SODIUM CHLORIDE 9 MG/ML
INJECTION, SOLUTION INTRAVENOUS ONCE
Status: COMPLETED | OUTPATIENT
Start: 2024-10-07 | End: 2024-10-07

## 2024-10-07 RX ORDER — SODIUM CHLORIDE 9 MG/ML
INJECTION, SOLUTION INTRAVENOUS CONTINUOUS
Status: DISCONTINUED | OUTPATIENT
Start: 2024-10-07 | End: 2024-10-07

## 2024-10-07 RX ORDER — HYDROCODONE BITARTRATE AND ACETAMINOPHEN 5; 325 MG/1; MG/1
1 TABLET ORAL EVERY 6 HOURS PRN
Qty: 20 TABLET | Refills: 0 | Status: SHIPPED | OUTPATIENT
Start: 2024-10-07 | End: 2024-10-12

## 2024-10-07 RX ADMIN — GADOBENATE DIMEGLUMINE 20 ML: 529 INJECTION, SOLUTION INTRAVENOUS at 12:42

## 2024-10-07 RX ADMIN — HYDROMORPHONE HYDROCHLORIDE 0.5 MG: 1 INJECTION, SOLUTION INTRAMUSCULAR; INTRAVENOUS; SUBCUTANEOUS at 00:02

## 2024-10-07 RX ADMIN — CARVEDILOL 6.25 MG: 6.25 TABLET, FILM COATED ORAL at 16:29

## 2024-10-07 RX ADMIN — SODIUM CHLORIDE: 9 INJECTION, SOLUTION INTRAVENOUS at 12:42

## 2024-10-07 RX ADMIN — NIFEDIPINE 90 MG: 30 TABLET, FILM COATED, EXTENDED RELEASE ORAL at 10:09

## 2024-10-07 RX ADMIN — ASPIRIN 325 MG: 325 TABLET, COATED ORAL at 20:50

## 2024-10-07 RX ADMIN — Medication 1000 UNITS: at 10:09

## 2024-10-07 RX ADMIN — ASPIRIN 325 MG: 325 TABLET, COATED ORAL at 10:08

## 2024-10-07 RX ADMIN — OLANZAPINE 10 MG: 5 TABLET, FILM COATED ORAL at 20:50

## 2024-10-07 RX ADMIN — CARVEDILOL 6.25 MG: 6.25 TABLET, FILM COATED ORAL at 10:11

## 2024-10-07 RX ADMIN — CEFEPIME 1000 MG: 1 INJECTION, POWDER, FOR SOLUTION INTRAMUSCULAR; INTRAVENOUS at 15:26

## 2024-10-07 ASSESSMENT — PAIN DESCRIPTION - ONSET: ONSET: PROGRESSIVE

## 2024-10-07 ASSESSMENT — PAIN DESCRIPTION - FREQUENCY: FREQUENCY: INTERMITTENT

## 2024-10-07 ASSESSMENT — PAIN DESCRIPTION - LOCATION: LOCATION: LEG

## 2024-10-07 ASSESSMENT — PAIN SCALES - GENERAL
PAINLEVEL_OUTOF10: 0
PAINLEVEL_OUTOF10: 4
PAINLEVEL_OUTOF10: 6
PAINLEVEL_OUTOF10: 7

## 2024-10-07 ASSESSMENT — PAIN DESCRIPTION - DESCRIPTORS: DESCRIPTORS: BURNING;ACHING

## 2024-10-07 ASSESSMENT — PAIN DESCRIPTION - ORIENTATION: ORIENTATION: LEFT

## 2024-10-07 NOTE — CARE COORDINATION
CLAUDY reviewed pt's chart and saw patient was recommended for HHC and that [facility] in getting this [facility].  CLAUDY was able to reach Osiris, [phone] ex [phone], who works as the Medical Swer at the [employer] [facility] [employer].  She asked therapy notes be faxed to [phone] and she will take care of ordering the HHC services.  CLAUDY [facility] that [facility]  Faxed all documentation to Osiris to begin HHC set up process.    Electronically signed by SHAWANDA Baker, DEMETRA on 10/7/2024 at 3:06 PM

## 2024-10-08 VITALS
SYSTOLIC BLOOD PRESSURE: 121 MMHG | RESPIRATION RATE: 18 BRPM | WEIGHT: 206 LBS | DIASTOLIC BLOOD PRESSURE: 77 MMHG | TEMPERATURE: 98.1 F | OXYGEN SATURATION: 96 % | HEART RATE: 76 BPM | BODY MASS INDEX: 31.22 KG/M2 | HEIGHT: 68 IN

## 2024-10-08 LAB
ALBUMIN SERPL-MCNC: 2.9 G/DL (ref 3.4–5)
ANION GAP SERPL CALCULATED.3IONS-SCNC: 15 MMOL/L (ref 3–16)
BUN SERPL-MCNC: 46 MG/DL (ref 7–20)
CALCIUM SERPL-MCNC: 9.2 MG/DL (ref 8.3–10.6)
CERULOPLASMIN SERPL-MCNC: 40 MG/DL (ref 15–30)
CHLORIDE SERPL-SCNC: 106 MMOL/L (ref 99–110)
CO2 SERPL-SCNC: 18 MMOL/L (ref 21–32)
CREAT SERPL-MCNC: 2.4 MG/DL (ref 0.8–1.3)
DEPRECATED RDW RBC AUTO: 15.5 % (ref 12.4–15.4)
GFR SERPLBLD CREATININE-BSD FMLA CKD-EPI: 27 ML/MIN/{1.73_M2}
GLUCOSE SERPL-MCNC: 102 MG/DL (ref 70–99)
HCT VFR BLD AUTO: 34.2 % (ref 40.5–52.5)
HGB BLD-MCNC: 10.9 G/DL (ref 13.5–17.5)
MCH RBC QN AUTO: 27.9 PG (ref 26–34)
MCHC RBC AUTO-ENTMCNC: 32 G/DL (ref 31–36)
MCV RBC AUTO: 87.3 FL (ref 80–100)
PHOSPHATE SERPL-MCNC: 4.1 MG/DL (ref 2.5–4.9)
PLATELET # BLD AUTO: 617 K/UL (ref 135–450)
PMV BLD AUTO: 7 FL (ref 5–10.5)
POTASSIUM SERPL-SCNC: 4.2 MMOL/L (ref 3.5–5.1)
RBC # BLD AUTO: 3.91 M/UL (ref 4.2–5.9)
SMA IGG SER-ACNC: 11 UNITS (ref 0–19)
SODIUM SERPL-SCNC: 139 MMOL/L (ref 136–145)
WBC # BLD AUTO: 8.6 K/UL (ref 4–11)

## 2024-10-08 PROCEDURE — 99024 POSTOP FOLLOW-UP VISIT: CPT | Performed by: NURSE PRACTITIONER

## 2024-10-08 PROCEDURE — 97116 GAIT TRAINING THERAPY: CPT

## 2024-10-08 PROCEDURE — 2580000003 HC RX 258: Performed by: ORTHOPAEDIC SURGERY

## 2024-10-08 PROCEDURE — 51798 US URINE CAPACITY MEASURE: CPT

## 2024-10-08 PROCEDURE — 6370000000 HC RX 637 (ALT 250 FOR IP): Performed by: INTERNAL MEDICINE

## 2024-10-08 PROCEDURE — 36415 COLL VENOUS BLD VENIPUNCTURE: CPT

## 2024-10-08 PROCEDURE — 6370000000 HC RX 637 (ALT 250 FOR IP): Performed by: NURSE PRACTITIONER

## 2024-10-08 PROCEDURE — 6360000002 HC RX W HCPCS: Performed by: ORTHOPAEDIC SURGERY

## 2024-10-08 PROCEDURE — 97110 THERAPEUTIC EXERCISES: CPT

## 2024-10-08 PROCEDURE — 6370000000 HC RX 637 (ALT 250 FOR IP): Performed by: ORTHOPAEDIC SURGERY

## 2024-10-08 PROCEDURE — 80069 RENAL FUNCTION PANEL: CPT

## 2024-10-08 PROCEDURE — 85027 COMPLETE CBC AUTOMATED: CPT

## 2024-10-08 PROCEDURE — APPNB45 APP NON BILLABLE 31-45 MINUTES: Performed by: NURSE PRACTITIONER

## 2024-10-08 RX ORDER — ASPIRIN 325 MG
325 TABLET, DELAYED RELEASE (ENTERIC COATED) ORAL 2 TIMES DAILY
Qty: 30 TABLET | Refills: 3 | Status: SHIPPED | OUTPATIENT
Start: 2024-10-08

## 2024-10-08 RX ORDER — CEFUROXIME AXETIL 250 MG/1
250 TABLET ORAL 2 TIMES DAILY
Qty: 14 TABLET | Refills: 0 | Status: SHIPPED | OUTPATIENT
Start: 2024-10-08 | End: 2024-10-15

## 2024-10-08 RX ORDER — FINASTERIDE 5 MG/1
5 TABLET, FILM COATED ORAL DAILY
Status: DISCONTINUED | OUTPATIENT
Start: 2024-10-08 | End: 2024-10-08 | Stop reason: HOSPADM

## 2024-10-08 RX ORDER — FINASTERIDE 5 MG/1
5 TABLET, FILM COATED ORAL DAILY
Qty: 30 TABLET | Refills: 3 | Status: SHIPPED | OUTPATIENT
Start: 2024-10-09

## 2024-10-08 RX ORDER — SODIUM BICARBONATE 650 MG/1
650 TABLET ORAL 2 TIMES DAILY
Status: DISCONTINUED | OUTPATIENT
Start: 2024-10-08 | End: 2024-10-08 | Stop reason: HOSPADM

## 2024-10-08 RX ADMIN — Medication 1000 UNITS: at 08:28

## 2024-10-08 RX ADMIN — FINASTERIDE 5 MG: 5 TABLET, FILM COATED ORAL at 10:29

## 2024-10-08 RX ADMIN — OLANZAPINE 5 MG: 5 TABLET, FILM COATED ORAL at 08:28

## 2024-10-08 RX ADMIN — ASPIRIN 325 MG: 325 TABLET, COATED ORAL at 08:28

## 2024-10-08 RX ADMIN — TAMSULOSIN HYDROCHLORIDE 0.4 MG: 0.4 CAPSULE ORAL at 08:28

## 2024-10-08 RX ADMIN — Medication 5 ML: at 08:28

## 2024-10-08 RX ADMIN — CARVEDILOL 6.25 MG: 6.25 TABLET, FILM COATED ORAL at 08:28

## 2024-10-08 RX ADMIN — SODIUM BICARBONATE 650 MG: 650 TABLET ORAL at 14:56

## 2024-10-08 RX ADMIN — CARVEDILOL 6.25 MG: 6.25 TABLET, FILM COATED ORAL at 17:30

## 2024-10-08 RX ADMIN — SODIUM CHLORIDE, PRESERVATIVE FREE 10 ML: 5 INJECTION INTRAVENOUS at 08:28

## 2024-10-08 RX ADMIN — NIFEDIPINE 90 MG: 30 TABLET, FILM COATED, EXTENDED RELEASE ORAL at 08:28

## 2024-10-08 RX ADMIN — CEFEPIME 1000 MG: 1 INJECTION, POWDER, FOR SOLUTION INTRAMUSCULAR; INTRAVENOUS at 14:58

## 2024-10-08 ASSESSMENT — PAIN DESCRIPTION - ORIENTATION: ORIENTATION: LEFT

## 2024-10-08 ASSESSMENT — PAIN SCALES - GENERAL: PAINLEVEL_OUTOF10: 2

## 2024-10-08 ASSESSMENT — PAIN DESCRIPTION - ONSET: ONSET: GRADUAL

## 2024-10-08 ASSESSMENT — PAIN DESCRIPTION - FREQUENCY: FREQUENCY: INTERMITTENT

## 2024-10-08 ASSESSMENT — PAIN DESCRIPTION - LOCATION: LOCATION: LEG

## 2024-10-08 ASSESSMENT — PAIN DESCRIPTION - DESCRIPTORS: DESCRIPTORS: ACHING

## 2024-10-08 NOTE — PROGRESS NOTES
[facility]    10/4/2024 10:52 AM        Name: Tye Resendez .              Admitted: 10/1/2024  Primary Care Provider: Chris Persaud MD (Tel: [phone])      Subjective:    No chest pian sob or fever    Reviewed interval ancillary notes    Current Medications  0.9 % sodium chloride infusion, Continuous  carvedilol (COREG) tablet 6.25 mg, BID WC  melatonin tablet 3 mg, Nightly PRN  NIFEdipine (PROCARDIA XL) extended release tablet 90 mg, Daily  tamsulosin (FLOMAX) capsule 0.4 mg, Daily  Vitamin D (CHOLECALCIFEROL) tablet 1,000 Units, Daily  sodium chloride flush 0.9 % injection 5-40 mL, 2 times per day  sodium chloride flush 0.9 % injection 5-40 mL, PRN  0.9 % sodium chloride infusion, PRN  polyethylene glycol (GLYCOLAX) packet 17 g, Daily PRN  acetaminophen (TYLENOL) tablet 650 mg, Q6H PRN   Or  acetaminophen (TYLENOL) suppository 650 mg, Q6H PRN  OLANZapine (ZYPREXA) tablet 5 mg, Daily   And  OLANZapine (ZYPREXA) tablet 10 mg, Nightly  cefepime (MAXIPIME) 1,000 mg in sodium chloride 0.9 % 50 mL IVPB (mini-bag), Q24H        Objective:  BP (!) 143/81   Pulse 82   Temp 98.7 °F (37.1 °C) (Oral)   Resp 18   Ht 1.727 m (5' 8\")   Wt 93.4 kg (206 lb)   SpO2 94%   BMI 31.32 kg/m²     Intake/Output Summary (Last 24 hours) at 10/4/2024 1052  Last data filed at 10/4/2024 0359  Gross per 24 hour   Intake 1617.34 ml   Output 1500 ml   Net 117.34 ml      Wt Readings from Last 3 Encounters:   10/01/24 93.4 kg (206 lb)   09/03/24 100.9 kg (222 lb 6.4 oz)   02/03/21 86.2 kg (190 lb)       General appearance:  Appears comfortable. AAOx3  HEENT: atraumatic, Pupils equal, muscous membranes moist, no masses appreciated  Cardiovascular: Regular rate and rhythm no murmurs appreciated  Respiratory: CTAB no wheezing  Gastrointestinal: Abdomen soft, non-tender, BS+  EXT: no edema  Neurology: no gross focal deficts  Psychiatry: 
                                                                  [facility]    10/2/2024 11:45 AM        Name: Tye Resendez .              Admitted: 10/1/2024  Primary Care Provider: Chris Persaud MD (Tel: [phone])      Subjective:    Not in bed no nausea vomiting chest pain    Reviewed interval ancillary notes    Current Medications  sodium bicarbonate 150 mEq in dextrose 5 % 1,000 mL infusion, Continuous  carvedilol (COREG) tablet 6.25 mg, BID WC  melatonin tablet 3 mg, Nightly PRN  NIFEdipine (PROCARDIA XL) extended release tablet 90 mg, Daily  tamsulosin (FLOMAX) capsule 0.4 mg, Daily  Vitamin D (CHOLECALCIFEROL) tablet 1,000 Units, Daily  sodium chloride flush 0.9 % injection 5-40 mL, 2 times per day  sodium chloride flush 0.9 % injection 5-40 mL, PRN  0.9 % sodium chloride infusion, PRN  polyethylene glycol (GLYCOLAX) packet 17 g, Daily PRN  acetaminophen (TYLENOL) tablet 650 mg, Q6H PRN   Or  acetaminophen (TYLENOL) suppository 650 mg, Q6H PRN  OLANZapine (ZYPREXA) tablet 5 mg, Daily   And  OLANZapine (ZYPREXA) tablet 10 mg, Nightly  cefepime (MAXIPIME) 1,000 mg in sodium chloride 0.9 % 50 mL IVPB (mini-bag), Q24H        Objective:  /74   Pulse 84   Temp 98.4 °F (36.9 °C) (Oral)   Resp 18   Ht 1.727 m (5' 8\")   Wt 93.4 kg (206 lb)   SpO2 96%   BMI 31.32 kg/m²     Intake/Output Summary (Last 24 hours) at 10/2/2024 1145  Last data filed at 10/2/2024 1110  Gross per 24 hour   Intake 590 ml   Output 550 ml   Net 40 ml      Wt Readings from Last 3 Encounters:   10/01/24 93.4 kg (206 lb)   09/03/24 100.9 kg (222 lb 6.4 oz)   02/03/21 86.2 kg (190 lb)       General appearance:  Appears comfortable. AAOx3  HEENT: atraumatic, Pupils equal, muscous membranes moist, no masses appreciated  Cardiovascular: Regular rate and rhythm no murmurs appreciated  Respiratory: CTAB no wheezing  Gastrointestinal: Abdomen soft, non-tender, BS+  EXT: no edema  Neurology: no gross 
                                                                  [facility]    10/7/2024 11:45 AM        Name: Tye Resendez .              Admitted: 10/1/2024  Primary Care Provider: Chris Persaud MD (Tel: [phone])      Subjective:    No chest pain sob or fevers  Current Medications  HYDROcodone-acetaminophen (NORCO) 5-325 MG per tablet 1 tablet, Q4H PRN  0.9 % sodium chloride infusion, Continuous  hydrALAZINE (APRESOLINE) injection 5 mg, Q4H PRN  0.45 % sodium chloride infusion, Continuous  sodium chloride flush 0.9 % injection 5-40 mL, 2 times per day  sodium chloride flush 0.9 % injection 5-40 mL, PRN  0.9 % sodium chloride infusion, PRN  aspirin EC tablet 325 mg, BID  HYDROmorphone HCl PF (DILAUDID) injection 0.5 mg, Q3H PRN  carvedilol (COREG) tablet 6.25 mg, BID WC  melatonin tablet 3 mg, Nightly PRN  NIFEdipine (PROCARDIA XL) extended release tablet 90 mg, Daily  tamsulosin (FLOMAX) capsule 0.4 mg, Daily  Vitamin D (CHOLECALCIFEROL) tablet 1,000 Units, Daily  sodium chloride flush 0.9 % injection 5-40 mL, 2 times per day  sodium chloride flush 0.9 % injection 5-40 mL, PRN  0.9 % sodium chloride infusion, PRN  polyethylene glycol (GLYCOLAX) packet 17 g, Daily PRN  acetaminophen (TYLENOL) tablet 650 mg, Q6H PRN   Or  acetaminophen (TYLENOL) suppository 650 mg, Q6H PRN  OLANZapine (ZYPREXA) tablet 5 mg, Daily   And  OLANZapine (ZYPREXA) tablet 10 mg, Nightly  cefepime (MAXIPIME) 1,000 mg in sodium chloride 0.9 % 50 mL IVPB (mini-bag), Q24H        Objective:  /74   Pulse 71   Temp 97.5 °F (36.4 °C) (Axillary)   Resp 12   Ht 1.727 m (5' 8\")   Wt 93.4 kg (206 lb)   SpO2 96%   BMI 31.32 kg/m²     Intake/Output Summary (Last 24 hours) at 10/7/2024 1145  Last data filed at 10/7/2024 1031  Gross per 24 hour   Intake 240 ml   Output 2725 ml   Net -2485 ml      Wt Readings from Last 3 Encounters:   10/01/24 93.4 kg (206 lb)   09/03/24 100.9 kg (222 lb 6.4 oz) 
                Gastroenterology Consult Note        Patient: Tye Resendez  : 1950  Acct#:      Date:  10/4/2024      1. Cellulitis of left lower extremity    2. Abnormal computed tomography of lower extremity    3. Chronic kidney disease, unspecified CKD stage    4. Hyperglycemia        Subjective:       History of Present Illness  Patient is a 73 y.o.  male admitted with Hyperglycemia [R73.9]  Soft tissue mass [M79.89]  Cellulitis of left lower extremity [L03.116]  Abnormal computed tomography of lower extremity [R93.6]  Chronic kidney disease, unspecified CKD stage [N18.9] who is seen in consult for elevated liver enzymes.   He was admitted to [facility] 24. States he presented with constipation. He was treated for UTI and d/c home on Keflex.   Returned to the ED on 10/1/24 for infection in the left leg. He is being treated for cellulitis and IR biopsy is planned to r/o underlying mass. Had elevated liver enzymes at asmission so GI consulted. No h/o liver disease. No alcohol use. No abdominal pain, N/V.   No herbals. May take one tylenol a day if needed. No nsaids.     Past Medical History:   Diagnosis Date    BPH with urinary obstruction     CKD (chronic kidney disease), stage IV (HCC)     Enlarged prostate     Hydronephrosis     Hyperlipidemia     Hypertension     Schizoaffective disorder, bipolar type (HCC)     Schizophrenia (HCC)       No past surgical history on file.   Past Endoscopic History: reports colonoscopy maybe 5 years ago    Admission Meds  No current facility-administered medications on file prior to encounter.     Current Outpatient Medications on File Prior to Encounter   Medication Sig Dispense Refill    sodium bicarbonate 325 MG tablet Take 4 tablets by mouth 2 times daily      tamsulosin (FLOMAX) 0.4 MG capsule Take 1 capsule by mouth daily 30 capsule 3    carvedilol (COREG) 6.25 MG tablet Take 1 tablet by mouth 2 times daily (with meals)      NIFEdipine (ADALAT CC) 90 MG 
               Gastroenterology Progress Note            Tye Resendez is a 73 y.o. male patient.  1. Cellulitis of left lower extremity    2. Abnormal computed tomography of lower extremity    3. Chronic kidney disease, unspecified CKD stage    4. Hyperglycemia        SUBJECTIVE:  no ab pain or n/v    Physical    VITALS:  BP (!) 145/81   Pulse 80   Temp 98.3 °F (36.8 °C) (Oral)   Resp 18   Ht 1.727 m (5' 8\")   Wt 93.4 kg (206 lb)   SpO2 93%   BMI 31.32 kg/m²   TEMPERATURE:  Current - Temp: 98.3 °F (36.8 °C); Max - Temp  Av.6 °F (37 °C)  Min: 98.3 °F (36.8 °C)  Max: 99.4 °F (37.4 °C)    Abdomen soft, ND, NT, no HSM, Bowel sounds normal     Data      Recent Labs     10/03/24  0525 10/04/24  0527 10/05/24  0605   WBC 14.4* 12.6* 13.1*   HGB 12.5* 11.9* 12.3*   HCT 39.1* 36.7* 38.3*   MCV 86.5 86.5 87.3   * 602* 677*     Recent Labs     10/03/24  0525 10/04/24  0526 10/05/24  0605    138 140   K 4.5 4.1 4.5    103 106   CO2 21 23 19*   PHOS 3.5 3.8 3.5   BUN 40* 36* 36*   CREATININE 2.5* 2.4* 2.4*     Recent Labs     10/03/24  0525 10/04/24  0526 10/05/24  0605   AST 89* 97* 59*   * 185* 148*   BILIDIR 0.2 0.2 0.2   BILITOT 0.5 0.5 0.5   ALKPHOS 190* 201* 200*     No results for input(s): \"LIPASE\", \"AMYLASE\" in the last 72 hours.          ASSESSMENT and plan:    Elevated liver enzymes, alk phos - Labs from  with ALT of 55 and other liver enzymes normal.  hepatic panel was normal on 24 except for total bili of 1.4. Then transaminases and alk phos elevated at admission with normal bili. He was d/c home on keflex 24 so elevated liver enzymes could be from DILI from keflex.  Recent CT with small cysts in liver only. Liver US pending from today.   - monitor liver enzymes  - check INR  - check hepatitis panel and liver specific labs          
               Gastroenterology Progress Note            Tye Resendez is a 73 y.o. male patient.  1. Cellulitis of left lower extremity    2. Abnormal computed tomography of lower extremity    3. Chronic kidney disease, unspecified CKD stage    4. Hyperglycemia    5. Abscess of left leg        SUBJECTIVE:  No abdominal pain, feels well     Physical    VITALS:  /74   Pulse 71   Temp 97.5 °F (36.4 °C) (Axillary)   Resp 12   Ht 1.727 m (5' 8\")   Wt 93.4 kg (206 lb)   SpO2 96%   BMI 31.32 kg/m²   TEMPERATURE:  Current - Temp: 97.5 °F (36.4 °C); Max - Temp  Av.5 °F (36.4 °C)  Min: 96.8 °F (36 °C)  Max: 98.1 °F (36.7 °C)    Abdomen soft, ND, NT, no HSM, Bowel sounds normal     Data      Recent Labs     10/05/24  0605 10/06/24  0553 10/07/24  0547   WBC 13.1* 11.8*  --    HGB 12.3* 12.5* 11.0*   HCT 38.3* 37.9* 32.8*   MCV 87.3 87.2  --    * 646*  --      Recent Labs     10/05/24  0605 10/06/24  0553 10/07/24  0547    139 139   K 4.5 4.8 4.7    103 105   CO2 19* 20* 21   PHOS 3.5 4.2 4.2   BUN 36* 40* 43*   CREATININE 2.4* 2.7* 2.3*     Recent Labs     10/05/24  0605 10/07/24  0546   AST 59* 46*   * 110*   BILIDIR 0.2 0.2   BILITOT 0.5 0.3   ALKPHOS 200* 166*     No results for input(s): \"LIPASE\", \"AMYLASE\" in the last 72 hours.          ASSESSMENT and plan:    Elevated liver enzymes, alk phos - Labs from  with ALT of 55 and other liver enzymes normal.  hepatic panel was normal on 24 except for total bili of 1.4. Then transaminases and alk phos elevated at admission with normal bili. He was d/c home on keflex 24 so elevated liver enzymes could be from DILI from keflex.  Recent CT with small cysts in liver only. Liver US shows 1.1 cm liver cyst no bilary ductal dilation. Along with GB polyp. Ferritin elevated but normal % iron sat. LISBETH negative. Vital hepatitis panel negative   10/7 LFT improved  - monitor liver enzymes  - Follow up AMA, alpha-1-antirtypsin, 
   MD Darell Vigil MD Aldo Estella,                  Office: [phone]                      Fax: [phone]             [employer]                     NEPHROLOGY INPATIENT PROGRESS  NOTE:     PATIENT NAME: Tye Resendez  : 1950  MRN: [id number]       RECOMMENDATIONS:   Stopped IV fluid      Urology follow up  Keep kinney   Monitor bladder scan, in past h/o BPH  Keep flomax     Follow-up urine culture, with possibility of UTI on Urine analysis  IV abx per 1' team   no need for dialysis,    at higher risk for decompensation, needing closer monitoring.      Pending leg mass bx   D/C plan from renal stand point: Stable  [facility],  -[facility] w/ us at  [facility] OFFICE   10/18/2024 11:00 AM  (I updated our information in discharge follow up providers' list.)         Thank you for allowing me to participate in this patient's care. Please do not hesitate to contact me anytime. We will follow along with you.       Darell Luis MD,  [facility]  Office: [phone] or Via [employer]  Fax: [phone]        IMPRESSION:       Admitted on:  10/1/2024 12:01 PM   For:    Hospital Problems             Last Modified POA    * (Principal) Soft tissue mass 10/1/2024 Yes         DIMA :   H/O proteinuric CKD: stage 4    Post ATN polyuria    - BL S.Cr.: mid 2s  ,  BL eGFR 20s, last known 9--24     - on admission S.Cr.: 3.2   - Etiology of DIMA - likely pre-renal      Associated problems:   - Volume status: hypovolemic  - HTN : no need for tight control   - Na: hyponatremia - mild   - Azotemia: pre-renal  - Electrolytes:  monitor   - Acid-Base: HIGH  AGMA    - Anemia: of chronic disease: mild       Other major problems: Management per primary and other consulting teams.      Patient Active Problem List   Diagnosis    DIMA (acute kidney injury) (HCC)    Hematuria    Hyponatremia    Sepsis (HCC)    Soft tissue 
   MD Darell Vigil MD Aldo Estella,                  Office: [phone]                      Fax: [phone]             [employer]                     NEPHROLOGY INPATIENT PROGRESS  NOTE:     PATIENT NAME: Tye Resendez  : 1950  MRN: [id number]       RECOMMENDATIONS:   Stop IVF NS 50 cc/hr     Nifedipine, Coreg for HTN  As needed hydralazine    Surgery team following for leg mass    Resume home sodium bicarb at home    Urology follow up  Keep kinney   Monitor bladder scan, in past h/o BPH  Keep flomax     Follow-up urine culture, with possibility of UTI on Urine analysis  IV abx per 1' team   no need for dialysis,    at higher risk for decompensation, needing closer monitoring.       D/C plan from renal stand point: Stable, discharge plans    follows w/ [facility] for [facility] care,  [facility],  -: follow up w/ us at  [facility] OFFICE   10/18/2024 11:00 AM  (I updated our information in discharge follow up providers' list.)         Thank you for allowing me to participate in this patient's care. Please do not hesitate to contact me anytime. We will follow along with you.       Darell Luis MD,  [facility]  Office: [phone] or Via [employer]  Fax: [phone]        IMPRESSION:       Admitted on:  10/1/2024 12:01 PM   For:    Hospital Problems             Last Modified POA    * (Principal) Soft tissue mass 10/1/2024 Yes    Abscess of left leg 10/6/2024 Yes    Leg mass, left 10/6/2024 Yes         DIMA :   H/O proteinuric CKD: stage 4    Post ATN polyuria to Nonoliguric range  - Etiology of DIMA - likely pre-renal    - BL S.Cr.: mid 2s  ,  BL eGFR 20s, last known 9--24     - on admission S.Cr.: 3.2  at peak  - now has been ~ 2.3-2.4   - last Estimated Creatinine Clearance: 29 mL/min (A) (based on SCr of 2.4 mg/dL (H)).      Associated problems:   - Volume status: hypovolemic  - HTN : no need for tight control   - Na: hyponatremia 
  [facility] - Inpatient Rehabilitation Department   Phone: [phone]    Physical Therapy    [x] Initial Evaluation            [] Daily Treatment Note         [] Discharge Summary      Patient: Tye Resendez   : 1950   MRN: [id number]   Date of Service:  10/2/2024  Admitting Diagnosis: Soft tissue mass  Current Admission Summary: Tye Resendez is a 73 y.o. male who presents for evaluation of concern for an infection in his left leg.  Reports swelling and redness x 1 week.  History of similar symptoms treated with antibiotics per wife.  No injury or trauma.  No fevers or chills.  No drainage.  No additional information is able to be obtained at this time.   Past Medical History:  has a past medical history of BPH with urinary obstruction, CKD (chronic kidney disease), stage IV (HCC), Enlarged prostate, Hydronephrosis, Hyperlipidemia, Hypertension, Schizoaffective disorder, bipolar type (HCC), and Schizophrenia ([facility]).  Past Surgical History:  has no past surgical history on file.  Discharge Recommendations: Tye Resendez scored a 18/24 on the AM-PAC short mobility form. Current research shows that an AM-PAC score of 18 or greater is typically associated with a discharge to the patient's home setting. Based on the patient's AM-PAC score and their current functional mobility deficits, it is recommended that the patient have 2-3 sessions per week of Physical Therapy at d/c to increase the patient's independence.  At this time, this patient demonstrates the endurance and safety to discharge home with (home services) and a follow up treatment frequency of 2-3x/wk.  Please see assessment section for further patient specific details.    If patient discharges prior to next session this note will serve as a discharge summary.  Please see below for the latest assessment towards goals.    HOME HEALTH CARE: LEVEL 1 STANDARD    - Initial home health evaluation to occur within 24-48 hours, in patient home   - 
  [facility] - Inpatient Rehabilitation Department   Phone: [phone]    Physical Therapy    [] Initial Evaluation            [x] Daily Treatment Note         [] Discharge Summary      Patient: Tye Resendez   : 1950   MRN: [id number]   Date of Service:  10/7/2024  Admitting Diagnosis: Soft tissue mass  Current Admission Summary: Tye Resendez is a 73 y.o. male who presents for evaluation of concern for an infection in his left leg.  Reports swelling and redness x 1 week.  History of similar symptoms treated with antibiotics per wife.  No injury or trauma.  No fevers or chills.  No drainage.  No additional information is able to be obtained at this time.   Past Medical History:  has a past medical history of BPH with urinary obstruction, CKD (chronic kidney disease), stage IV (HCC), Enlarged prostate, Hydronephrosis, Hyperlipidemia, Hypertension, Schizoaffective disorder, bipolar type (HCC), and Schizophrenia ([facility]).  Past Surgical History:  has a past surgical history that includes Leg Surgery (Left, 10/6/2024).  Discharge Recommendations: Tye Resendez scored a 18/24 on the AM-PAC short mobility form. Current research shows that an AM-PAC score of 18 or greater is typically associated with a discharge to the patient's home setting. Based on the patient's AM-PAC score and their current functional mobility deficits, it is recommended that the patient have 2-3 sessions per week of Physical Therapy at d/c to increase the patient's independence.  At this time, this patient demonstrates the endurance and safety to discharge home with (home services) and a follow up treatment frequency of 2-3x/wk.  Please see assessment section for further patient specific details.    If patient discharges prior to next session this note will serve as a discharge summary.  Please see below for the latest assessment towards goals.    HOME HEALTH CARE: LEVEL 1 STANDARD    - Initial home health evaluation to occur within 
  [facility] - Inpatient Rehabilitation Department   Phone: [phone]    Occupational Therapy    [] Initial Evaluation            [x] Daily Treatment Note         [] Discharge Summary      Patient: Tye Resendez   : 1950   MRN: [id number]   Date of Service:  10/3/2024    Admitting Diagnosis: Soft tissue mass  Current Admission Summary: Tye Resendez is a 73 y.o. male who presents for evaluation of concern for an infection in his left leg.  Reports swelling and redness x 1 week.  History of similar symptoms treated with antibiotics per wife.  No injury or trauma.  No fevers or chills.  No drainage.  No additional information is able to be obtained at this time.   Past Medical History:  has a past medical history of BPH with urinary obstruction, CKD (chronic kidney disease), stage IV (HCC), Enlarged prostate, Hydronephrosis, Hyperlipidemia, Hypertension, Schizoaffective disorder, bipolar type (HCC), and Schizophrenia ([facility]).  Past Surgical History:  has no past surgical history on file.  Discharge Recommendations: Tye Resendez scored a 18/24 on the AM-PAC ADL Inpatient form. Current research shows that an AM-PAC score of 18 or greater is typically associated with a discharge to the patient's home setting. Based on the patient's AM-PAC score, and their current ADL deficits, it is recommended that the patient have 2-3 sessions per week of Occupational Therapy at d/c to increase the patient's independence.  At this time, this patient demonstrates the endurance and safety to discharge home with HHOT (home vs OP services) and a follow up treatment frequency of 2-3x/wk.   Please see assessment section for further patient specific details.    If patient discharges prior to next session this note will serve as a discharge summary.  Please see below for the latest assessment towards goals.     HOME HEALTH CARE: LEVEL 1 STANDARD     - Initial home health evaluation to occur within 24-48 hours, in patient home 
  [facility] - Inpatient Rehabilitation Department   Phone: [phone]    Occupational Therapy    [] Initial Evaluation            [x] Daily Treatment Note         [] Discharge Summary      Patient: Tye Resendez   : 1950   MRN: [id number]   Date of Service:  10/5/2024    Admitting Diagnosis: Soft tissue mass  Current Admission Summary: Tye Resendez is a 73 y.o. male who presents for evaluation of concern for an infection in his left leg.  Reports swelling and redness x 1 week.  History of similar symptoms treated with antibiotics per wife.  No injury or trauma.  No fevers or chills.  No drainage.  No additional information is able to be obtained at this time.   Past Medical History:  has a past medical history of BPH with urinary obstruction, CKD (chronic kidney disease), stage IV (HCC), Enlarged prostate, Hydronephrosis, Hyperlipidemia, Hypertension, Schizoaffective disorder, bipolar type (HCC), and Schizophrenia ([facility]).  Past Surgical History:  has no past surgical history on file.  Discharge Recommendations: Tye Resendez scored a 18/24 on the AM-PAC ADL Inpatient form. Current research shows that an AM-PAC score of 18 or greater is typically associated with a discharge to the patient's home setting. Based on the patient's AM-PAC score, and their current ADL deficits, it is recommended that the patient have 2-3 sessions per week of Occupational Therapy at d/c to increase the patient's independence.  At this time, this patient demonstrates the endurance and safety to discharge home with HHOT (home vs OP services) and a follow up treatment frequency of 2-3x/wk.   Please see assessment section for further patient specific details.    If patient discharges prior to next session this note will serve as a discharge summary.  Please see below for the latest assessment towards goals.     HOME HEALTH CARE: LEVEL 1 STANDARD     - Initial home health evaluation to occur within 24-48 hours, in patient home 
 [facility]   Progress Note    CHIEF COMPLAINT/DIAGNOSIS: S/p left lower leg I&D (10/6/24)    SUBJECTIVE: The patient is seen sitting up in the chair with family at bedside;  Denies new issues. Pain controlled.      OBJECTIVE  Physical    VITALS:  /69   Pulse 78   Temp 97.4 °F (36.3 °C) (Oral)   Resp 18   Ht 1.727 m (5' 8\")   Wt 93.4 kg (206 lb)   SpO2 95%   BMI 31.32 kg/m²     GENERAL: Alert and oriented x3, in no acute distress.    MUSCULOSKELETAL: Able to flex and extend the ankle without issue.    INCISION:  Covered with yesterdays dressing; clean, dry and intact.  Sensory:  Intact to light touch in tibial and peroneal distributions.   Vascular:   2+ DP pulses with brisk cap refill.    Data    ALL MEDICATIONS HAVE BEEN REVIEWED    CBC:   Recent Labs     10/06/24  0553 10/07/24  0547 10/08/24  0555   WBC 11.8*  --  8.6   HGB 12.5* 11.0* 10.9*   HCT 37.9* 32.8* 34.2*   *  --  617*     BMP:   Recent Labs     10/06/24  0553 10/07/24  0547 10/08/24  0555    139 139   K 4.8 4.7 4.2    105 106   CO2 20* 21 18*   PHOS 4.2 4.2 4.1   BUN 40* 43* 46*   CREATININE 2.7* 2.3* 2.4*     INR:   No results for input(s): \"INR\" in the last 72 hours.      Surgical culture: staph aureus  Wound culture pre-op growing staph aureus  Pathology remains in process    ASSESSMENT:  S/p left tib/fib I&D and biopsy/excision (10/6/24), POD#2  Bipolar/schizophrenia  Dementia  ABLA    PLAN:   Continue once daily repeat dressing changes with packing followed by dry dressing and ACE.  - WB status:  WBAT; reviewed post op precautions  - DVT prophylaxis: per primary team  - Pain Control: Rx for dc in chart.    - ID:  Abx per primary team/ ID   - Disposition: per primary team; ok to d/c from our end once final abx recs complete; ok to dc prior to biopsy results    Follow-up with Dr. Arebi in 10-14 days  Office #[phone]  Future Appointments   Date Time Provider Department Center   10/18/2024 11:00 AM 
 [facility] Orthopedic Surgery   Progress Note    CHIEF COMPLAINT/DIAGNOSIS: S/p left lower leg I&D (10/6/24)    SUBJECTIVE: The patient is seen sitting up in the bed; describes mild pain. Denies paresthesias or new issues.   Was able to ambulate to  this morning without issues by his report.    OBJECTIVE  Physical    VITALS:  /84   Pulse 73   Temp 97.6 °F (36.4 °C) (Oral)   Resp 16   Ht 1.727 m (5' 8\")   Wt 93.4 kg (206 lb)   SpO2 97%   BMI 31.32 kg/m²     GENERAL: Alert and oriented x3, in no acute distress.    MUSCULOSKELETAL: Able to flex and extend the ankle without issue.    INCISION:  Covered with post-op dressing; clean, dry and intact.  Dressing removed- erythema greatly reduced as well as swelling.  Packing removed and wound cleansed with saline and betadine.  New packing placed followued by dry dressing and ACE wrap.   Sensory:  Intact to light touch in tibial and peroneal distributions.   Vascular:   2+ DP pulses with brisk cap refill.    Data    ALL MEDICATIONS HAVE BEEN REVIEWED    CBC:   Recent Labs     10/05/24  0605 10/06/24  0553 10/07/24  0547   WBC 13.1* 11.8*  --    HGB 12.3* 12.5* 11.0*   HCT 38.3* 37.9* 32.8*   * 646*  --      BMP:   Recent Labs     10/05/24  0605 10/06/24  0553 10/07/24  0547    139 139   K 4.5 4.8 4.7    103 105   CO2 19* 20* 21   PHOS 3.5 4.2 4.2   BUN 36* 40* 43*   CREATININE 2.4* 2.7* 2.3*     INR:   Recent Labs     10/04/24  1758   INR 1.23*       Surgical culture: 1+ gram + cocci  Wound culture pre-op growing staph aureus    ASSESSMENT:  S/p left tib/fib I&D and biopsy/excision (10/6/24), POD#1  Bipolar/schizophrenia  Dementia  ABLA    PLAN:   Packing removed today - tolerated well; once daily repeat dressing changes with packing followed by dry dressing and ACE.  - WB status:  WBAT; reviewed post op precautions  - DVT prophylaxis: per primary team  - Pain Control: Rx for dc in chart.    - ID:  Abx per primary team/ ID   - Disposition: per 
Covering orthopaedic surgeon notified by secure message of drainage from soft tissue mass. Surgeon responded by secure message to cleanse area and cover with pressure dressing. Keep biopsy scheduled for Monday.  
Incentive Spirometry education and demonstration completed by Respiratory Therapy Yes      Response to education: Excellent     Teaching Time: 5 minutes    Minimum Predicted Vital Capacity - 684 mL.  Patient's Actual Vital Capacity - 2500 mL. Turning over to Nursing for routine follow-up Yes.    Comments: IS gal met    Electronically signed by Larry Miller RCP on 10/6/2024 at 4:56 PM    
Lab notified this nurse of critical lab value for C02 of 14, oncall messaged via perfect serve, no new orders at this time.   
MD notified of leg mass now draining purulent drainage. New orders obtained to culture drainage and notify Ortho. Drainage culture collected and sent to lab.  
Message sent to oncall hospitalist regarding needing kinney changed due to being admitted with chronic kinney and needing order changed for MRI with and without contast of patients L Leg. GFR is 19 and cannot receive contrast. MD responded \"will take care of it\"  
No new order placed from previous day for MRI of Left tib/fib without contrast due to patient's kidney function. Attending MD notified by secure message requesting new order. Waiting for response.  
No response received from attending MD. Writer attempted to reach out to attending again regarding MRI order. Message deferred to covering physician. New order obtained.  
Occupational Therapy  OT tx attempt this afternoon. Pt returning to bed with PCA upon arrival. Pt remains supine in bed with needs and alarm on. Pt declined participation at this time as he would like to rest at this time. No further needs requested. Thank you, BLANQUITA Packer, OTR/L, [id number].    
PCA notified writer that patient was found with empty glass of water. Writer asked patient if he had anything to drink recently and patient responded, \" Yes I did. I feel like I have a temperature and had to have something cold to drink.\" Writer reminded patient that he was supposed to remain NPO for the biopsy of the mass on left leg as well as the liver ultrasound. Patient verbalized understanding. IR and attending MD have been notified. Biopsy has been cancelled and will be re-scheduled for Monday if patient is still inpatient. Call light in reach.  
PM assessment complete and documented. Meds given per MAR. LLE dressing changed per order. Area red and warm. Some purulent drainage noted along with some serosanguinous drainage. Non-stick pad placed. LLE wrapped with kerlex and ACE wrap. Pt tolerated well. No other needs expressed. Call light in reach.  
Patient bladder scanned. Patient unable to void even with constant encouragement. Volume shows greater than 200ml and patient unable to void within six hours of kinney removal from this morning at 11am. Kinney replaced per urology order.  Patient educated on the procedure and patient agreed for catheter placement,  16fr kinney placed without issues. Aseptic technique used, urine return noted.     
Patient discharge order noted. Patient is alert and oriented upon discharge. PIV removed without complications. Patient discharging with kinney catheter in.   Patient picked up by family. Discharge instructions provided to the patient and family.   
Patient going off unit to surgery for Left leg debridement incision and drainage.  
Patient to PACU from OR. Awakens to voice. VSS. Surgical dressing to left lower leg intact, cap refil < 3 sec to left toes.   
Patient's kinney catheter removed per provider order.     
Pharmacy Home Medication Reconciliation Note    A medication reconciliation has been completed for Tye Resendez 1950    Pharmacy: [facility] [address] [place] [address]  Information provided by: [facility] and [facility] Nima    The patient's home medication list is as follows:  No current facility-administered medications on file prior to encounter.     Current Outpatient Medications on File Prior to Encounter   Medication Sig Dispense Refill    sodium bicarbonate 325 MG tablet Take 4 tablets by mouth 2 times daily      tamsulosin (FLOMAX) 0.4 MG capsule Take 1 capsule by mouth daily 30 capsule 3    carvedilol (COREG) 6.25 MG tablet Take 1 tablet by mouth 2 times daily (with meals)      NIFEdipine (ADALAT CC) 90 MG extended release tablet Take 1 tablet by mouth daily Take one tablet by mouth once a day on an empty stomach.      OLANZapine (ZYPREXA) 10 MG tablet Take 0.5-1 tablets by mouth See Admin Instructions Take 1/2 tablet by mouth in the morning and 1 whole tablet at night.      vitamin D (CHOLECALCIFEROL) 25 MCG (1000 UT) TABS tablet Take 1 tablet by mouth daily Indications: Hyperparathyroidism      melatonin 3 MG TABS tablet Take 1 tablet by mouth daily      OLANZapine (ZYPREXA) 5 MG tablet Take 1 tablet by mouth every 8 hours as needed (For schizophrenia) (Patient not taking: Reported on 10/1/2024)      haloperidol (HALDOL) 5 MG tablet Take 1 tablet by mouth 2 times daily (Patient not taking: Reported on 10/1/2024)         Patient is no longer taking haloperidol.    Of note, patient received AM meds prior to ED arrival.    Timing of last doses updated.    Thank you,  Sharonda Hollis, IANhT      
Phase 1 recovery completed, will transfer back to .   
Pt reports having chronic kinney for past year, last changed September 1st. Kinney changed per protocol with MD order.   
Shift assessment completed. Routine vitals obtained and stable. Scheduled medications given. Patient is awake, alert and oriented. Respirations are easy and unlabored. Dressing to left leg changed and mass assessed. Mass is actively draining purulent drainage. No odor noted. MD notified by secure message and responded wanting writer to update. Orthopaedic surgery. On call Ortho notified by secure message and responded. No new orders at this time. Patient does not appear to be in distress, resting comfortably in bed at this time. Call light within reach.   
Shift assessment completed. Routine vitals obtained and stable. Scheduled medications given. Patient is awake, alert and oriented. Respirations are easy and unlabored. Patient does not appear to be in distress, resting comfortably in bed at this time. Call light within reach.   
Shift assessment completed. Routine vitals obtained and stable. Scheduled medications given. Patient is awake, alert and oriented. Respirations are easy and unlabored. Patient does not appear to be in distress, resting comfortably in recliner at this time. Call light within reach.   
Shift assessment completed. Routine vitals obtained and stable. Scheduled medications were not given due to biopsy of leg mass later this morning.  Patient is awake, alert and oriented. Respirations are easy and unlabored. Patient does not appear to be in distress, resting comfortably in bed at this time. Call light within reach.   
Shift assessment completed. Routine vitals obtained. Patient is awake, alert and oriented. Respirations are easy and unlabored. Patient does not appear to be in distress, resting comfortably at this time. Call light within reach. Fall precautions are in place. No further needs expressed at this time.  
Shift assessment completed. Routine vitals obtained. Scheduled medications given. Patient is awake, alert and oriented. Respirations are easy and unlabored. Patient does not appear to be in distress, resting comfortably at this time. Call light within reach.   
Shift assessment, VSS. Patient in bed alert and oriented. Appear to be not in distress. States that he is experiencing mild pain with a rate of 3/10 and requesting to have PRN Tylenol. Patient satisfied. Respiration patric and unlabored. No further intervention needed at this time. Plan of care ongoing.  
Shift assessment. VSS. Patient in bed alert and oriented. Respiration even and unlabored on room air. Appears to be not in distress. Denies pain, nausea or vomiting. States that he just want to rest. Call light within reach. Bed lower in position. No further intervention needed at this time. Plan of care ongoing.  
Shift assessment. VSS. Patient in bed appears to be lethargic but responds to voice. States that he has no appetite and did not want to touch his food. Continues to sleep after taking his pills. Respiration even and unlabored with nasal cannula due to decrease oxygen level at 88%. No further intervention needed at this time. Plan of care ongoing.  
Teaching / education initiated regarding perioperative experience, expectations, and pain management during stay. Patient verbalized understanding.      Breakfast this AM. Dr. Cruz aware.   
  - Therapy to evaluate with goal of regaining prior level of functioning   - Therapy to evaluate if patient has Home Health Aide needs for personal care     DME Required For Discharge: patient has all required DME for discharge  Precautions/Restrictions: high fall risk, up as tolerated  Weight Bearing Restrictions: no restrictions  [] Right Upper Extremity        [] Left Upper Extremity         [] Right Lower Extremity         [] Left Lower Extremity             Required Braces/Orthotics: no braces required                   [] Right            [] Left  Positional Restrictions:no positional restrictions     Pre-Admission Information   Lives With: spouse                  Type of Home: assisted living, [facility] at [facility]  Home Layout: one level  Home Access: level entry  Bathroom Layout: walker accessible, wheelchair accessible, walk in shower  Bathroom Equipment: grab bars in shower, grab bars around toilet, shower chair, hand held shower head  Toilet Height: standard height  Home Equipment: no prior equipment  Transfer Assistance: Independent without use of device  Ambulation Assistance:Independent without use of device  ADL Assistance: independent with all ADL's  IADL Assistance:  facility completes laundry and meal prep, pt and spouse do light cleaning/vacuuming  Active :        [] Yes                 [x] No  Hand Dominance: [] Left                 [x] Right  Current Employment: retired.  Occupation: [employer], [employer]    Hobbies: playing [employer] singing, activities at facility, courtyard garden and photographing birds  Recent Falls: Pt denies fall since last admission     Available Assistance at Discharge: 24 hr physical assistance available     Examination   Vision:   Vision Gross Assessment: Impaired and Vision Corrective Device: wears glasses for reading  Hearing:   WFL  Posture:   Fair  Sensation:   denies numbness and tingling  ROM:   (B) UE AROM WFL  Strength: 
Sepsis (HCC)    Soft tissue mass         : other supportive care :   - Check frequent renal function panel with electrolytes-phosphorus  - Strict monitoring of I/Os, daily weight, as able to  - Renal feeds/diet  - Current medications reviewed.  As needed for my evaluation   - Nephrotoxic medications have been discontinued.    - Dose adjusted and appropriate.     - Dose meds for eGFR <15 mL/min/1.73m2 during DIMA    - Avoid heavy opioids due to renal failure - may use very low dose dilaudid / fentanyl with close monitoring of CNS and respiratory depression.    -Pharmacy assistant.       Please refer to the orders.   Medical decision making- High Complexity. Multiple complex problems.  Discussed with patient and  treatment team,  attending Clara Jones MD   Time spent includes face-to-face meeting/discussion with patient, patient's family-as available, and treatment team (including primary/referring team and other consultants and nursing team- as needed; and included coordination of care with the treatment team; and review of patient's electronic medical records as needed for my evaluation and ordering appropriates tests.           =======================================================================================   Subjective / interval history / nephrology update / medical decision making:   Refer to assessment and plan for more details.   Noted resting in bed comfortably   Reported no active complaints/distress,   Renal labs noted  Bx for leg mas  .   =======================================================================================    Chart reviewed, patient's pertinent electronic medical records , Medical history and medication reviewed as needed for my evaulation.           MEDICATIONS: reviewed by me.   Medications Prior to Admission:  No current facility-administered medications on file prior to encounter.     Current Outpatient Medications on File Prior to Encounter   Medication Sig Dispense 
Take 1 tablet by mouth daily Indications: Hyperparathyroidism   Yes Tim Huerta MD   melatonin 3 MG TABS tablet Take 1 tablet by mouth daily   Yes Tim Huerta MD   OLANZapine (ZYPREXA) 5 MG tablet Take 1 tablet by mouth every 8 hours as needed (For schizophrenia)  Patient not taking: Reported on 10/1/2024    Tim Huerta MD   haloperidol (HALDOL) 5 MG tablet Take 1 tablet by mouth 2 times daily  Patient not taking: Reported on 10/1/2024    Tim Huerta MD       Current Medications:   Current Facility-Administered Medications: carvedilol (COREG) tablet 6.25 mg, 6.25 mg, Oral, BID WC  melatonin tablet 3 mg, 3 mg, Oral, Nightly PRN  NIFEdipine (PROCARDIA XL) extended release tablet 90 mg, 90 mg, Oral, Daily  tamsulosin (FLOMAX) capsule 0.4 mg, 0.4 mg, Oral, Daily  Vitamin D (CHOLECALCIFEROL) tablet 1,000 Units, 1,000 Units, Oral, Daily  sodium chloride flush 0.9 % injection 5-40 mL, 5-40 mL, IntraVENous, 2 times per day  sodium chloride flush 0.9 % injection 5-40 mL, 5-40 mL, IntraVENous, PRN  0.9 % sodium chloride infusion, , IntraVENous, PRN  polyethylene glycol (GLYCOLAX) packet 17 g, 17 g, Oral, Daily PRN  acetaminophen (TYLENOL) tablet 650 mg, 650 mg, Oral, Q6H PRN **OR** acetaminophen (TYLENOL) suppository 650 mg, 650 mg, Rectal, Q6H PRN  OLANZapine (ZYPREXA) tablet 5 mg, 5 mg, Oral, Daily **AND** OLANZapine (ZYPREXA) tablet 10 mg, 10 mg, Oral, Nightly  cefepime (MAXIPIME) 1,000 mg in sodium chloride 0.9 % 50 mL IVPB (mini-bag), 1,000 mg, IntraVENous, Q24H    Allergies:  Patient has no known allergies.    Social History     Socioeconomic History    Marital status:      Spouse name: Not on file    Number of children: Not on file    Years of education: Not on file    Highest education level: Not on file   Occupational History    Not on file   Tobacco Use    Smoking status: Former    Smokeless tobacco: Never   Substance and Sexual Activity    Alcohol use: Not Currently    
      Recent imaging reviewed    Problem List  Principal Problem:    Soft tissue mass  Resolved Problems:    * No resolved hospital problems. *       Assessment/Plan:   Left lower ext cellultis with possibel underlying mass  Doppler negtavie for clot  New IV antibiotics repeat CBC in a.m. MRI reviewed discussed with Ortho will get IR biopsy today     Bipolar disorder home meds     Wesley on Ckd 4 ivf  Improving BMP in a.m.     DVT prophylaxis lovenox  Code status full code         Clara Jones MD   10/3/2024 11:08 AM    
Heterogenous soft tissue mass in the anterior lower leg soft tissues.   Portions of the mass are heavily calcified.  The differential includes an   involving hematoma as well as other soft tissue masses.  Recommend further   evaluation with MRI using soft tissue mass protocol.      2.  No definite evidence of acute fracture or osteomyelitis.  No definite   abscess or soft tissue gas.      3.  Chronic appearing remodeling of the knee joint likely related to a remote   injury.         Vascular duplex lower extremity venous left   Final Result      IR TRANSCATHETER BIOPSY    (Results Pending)       Recent imaging reviewed    Problem List  Principal Problem:    Soft tissue mass  Resolved Problems:    * No resolved hospital problems. *       Assessment/Plan:   Left lower ext cellultis with possibel underlying mass  Doppler negtavie for clot  Cbc in am  Continue iv atbx  Patient ate yesterday unable to get biopsy done will obtain biopsy on Monday      Bipolar disorder home meds     Wesley on Ckd 4 stop fluds monitor  Elevated lfts hep panel pending will discuss ultrasound with GI with GI recommendations  Repeat klfts in am  DVT prophylaxis lovenox  Code status full code         Clara Jones MD   10/5/2024 11:48 AM    
consideration of biopsy of mass     Case discussed with hospitalist    JOSE Ashford - CNP  10/2/2024  12:53 PM    
soft tissues.   Portions of the mass are heavily calcified.  The differential includes an   involving hematoma as well as other soft tissue masses.  Recommend further   evaluation with MRI using soft tissue mass protocol.      2.  No definite evidence of acute fracture or osteomyelitis.  No definite   abscess or soft tissue gas.      3.  Chronic appearing remodeling of the knee joint likely related to a remote   injury.         Vascular duplex lower extremity venous left   Final Result      IR TRANSCATHETER BIOPSY    (Results Pending)       Recent imaging reviewed    Problem List  Principal Problem:    Soft tissue mass  Resolved Problems:    * No resolved hospital problems. *       Assessment/Plan:   Left lower ext cellultis with possibel underlying mass  Mass unlikely likely was an abscess draining karen pus will make n.p.o. discussed with Ortho will come for I&D continue IV antibiotic    Bipolar disorder home meds     Wesley on Ckd 4 stop fluds monitor    Elevated lfts monitor await further GI recs    Kidney mass mri right kidney  DVT prophylaxis lovenox  Code status full code         Clara Jones MD   10/6/2024 11:20 AM    
attends with cues to redirect  Memory: decreased recall of recent events  Safety Judgement: good awareness of safety precautions  Problem Solving: assistance required to generate solutions  Insights: decreased awareness of deficits  Initiation: requires cues for some  Sequencing: requires cues for some  Telling stories from service about drugs  Orientation:    oriented to person, oriented to place, oriented to time, and disoriented to situation - did not know had L LE I&D  Command Following:   WFL    Education  Barriers To Learning: cognition  Patient Education: patient educated on goals, PT role and benefits, plan of care, general safety, discharge recommendations  Learning Assessment:  patient verbalizes understanding, would benefit from continued reinforcement    Assessment  Activity Tolerance: Fair  Impairments Requiring Therapeutic Intervention: decreased functional mobility, decreased ADL status, decreased ROM, decreased strength, decreased endurance, decreased balance, increased pain  Prognosis: good  Clinical Assessment: Pt presents slightly below his baseline function and would benefit from skilled PT services to promote safe return to PLOF. Pt demonstrates maintenance of function s/p LLE I&D with excision of mass. Pt continues to ambulate appropriately with use of RW and CGA only. At this time, pt would continue to benefit from skilled PT services, though PT is currently recommending use of RW upon discharge due to mild instability and to assist with pain control during ambulation.  Safety Interventions: patient left in bed, bed alarm in place, call light within reach, gait belt, patient at risk for falls, and nurse notified    Plan  Frequency: 3-5 x/per week  Current Treatment Recommendations: strengthening, ROM, balance training, functional mobility training, transfer training, gait training, stair training, endurance training, patient/caregiver education, home exercise program, and safety 
Automated exposure control, iterative reconstruction, and/or weight based adjustment of the mA/kV was utilized to reduce the radiation dose to as low as reasonably achievable. COMPARISON: None. HISTORY ORDERING SYSTEM PROVIDED HISTORY: infection TECHNOLOGIST PROVIDED HISTORY: Reason for exam:->infection Decision Support Exception - unselect if not a suspected or confirmed emergency medical condition->Emergency Medical Condition (MA) Reason for Exam: Leg Pain (Pt arrives from home by wife. Pt C/O L lower leg pain for 4 days. ) FINDINGS: Bones: Chronic appearing changes of the knee joint with partial fusion of the tibiofemoral joint and partial fusion of the proximal fibula with the femur. No acute fracture.  No acute appearing destructive osseous changes. Soft Tissue: Heterogenous soft tissue mass in the anterior aspect of the lower leg measuring 5.0 x 2.4 x 11.5 cm.  This mass is partially calcified. The mass has heterogenous attenuation.  The density is not consistent with simple fluid.  There is heavy calcification within the more lateral aspect of the mass.  No soft tissue gas.     1.  Heterogenous soft tissue mass in the anterior lower leg soft tissues. Portions of the mass are heavily calcified.  The differential includes an involving hematoma as well as other soft tissue masses.  Recommend further evaluation with MRI using soft tissue mass protocol. 2.  No definite evidence of acute fracture or osteomyelitis.  No definite abscess or soft tissue gas. 3.  Chronic appearing remodeling of the knee joint likely related to a remote injury.           Imaging: [unfilled]         ======================================================================  Please note that this chart entry has been generated using voice recognition software, mainly.  So please excuse brevity and/or typos.  While every effort and attempts have been made to ensure the accuracy of this automated transcription, some errors may have occurred; 
adequately visualizing the left posterial tibial veins and peroneal veins due to above limitiations.     CT TIBIA FIBULA LEFT WO CONTRAST    Result Date: 10/1/2024  EXAMINATION: CT OF THE LEFT TIBIA AND FIBULA WITHOUT CONTRAST 10/1/2024 2:14 pm TECHNIQUE: CT of the left tibia and fibula was performed without the administration of intravenous contrast.  Multiplanar reformatted images are provided for review. Automated exposure control, iterative reconstruction, and/or weight based adjustment of the mA/kV was utilized to reduce the radiation dose to as low as reasonably achievable. COMPARISON: None. HISTORY ORDERING SYSTEM PROVIDED HISTORY: infection TECHNOLOGIST PROVIDED HISTORY: Reason for exam:->infection Decision Support Exception - unselect if not a suspected or confirmed emergency medical condition->Emergency Medical Condition (MA) Reason for Exam: Leg Pain (Pt arrives from home by wife. Pt C/O L lower leg pain for 4 days. ) FINDINGS: Bones: Chronic appearing changes of the knee joint with partial fusion of the tibiofemoral joint and partial fusion of the proximal fibula with the femur. No acute fracture.  No acute appearing destructive osseous changes. Soft Tissue: Heterogenous soft tissue mass in the anterior aspect of the lower leg measuring 5.0 x 2.4 x 11.5 cm.  This mass is partially calcified. The mass has heterogenous attenuation.  The density is not consistent with simple fluid.  There is heavy calcification within the more lateral aspect of the mass.  No soft tissue gas.     1.  Heterogenous soft tissue mass in the anterior lower leg soft tissues. Portions of the mass are heavily calcified.  The differential includes an involving hematoma as well as other soft tissue masses.  Recommend further evaluation with MRI using soft tissue mass protocol. 2.  No definite evidence of acute fracture or osteomyelitis.  No definite abscess or soft tissue gas. 3.  Chronic appearing remodeling of the knee joint likely 
transcription may not be entered as intended.  However, inadvertent computerized transcription errors may be present.  So please contact us if any clarification needed.

## 2024-10-08 NOTE — PLAN OF CARE
Problem: Safety - Adult  Goal: Free from fall injury  10/2/2024 0013 by Mony Aiken, RN  Outcome: Progressing  10/2/2024 0013 by Mony Aiken, RN  Outcome: Progressing     Problem: Pain  Goal: Verbalizes/displays adequate comfort level or baseline comfort level  Outcome: Progressing     
  Problem: Safety - Adult  Goal: Free from fall injury  10/3/2024 1234 by Nita Lindsey RN  Outcome: Progressing  10/3/2024 0314 by Amy Thakur RN  Outcome: Progressing     Problem: Pain  Goal: Verbalizes/displays adequate comfort level or baseline comfort level  10/3/2024 1234 by Nita Lindsey RN  Outcome: Progressing  10/3/2024 0314 by Amy Thakur RN  Outcome: Progressing     Problem: Skin/Tissue Integrity  Goal: Absence of new skin breakdown  Description: 1.  Monitor for areas of redness and/or skin breakdown  2.  Assess vascular access sites hourly  3.  Every 4-6 hours minimum:  Change oxygen saturation probe site  4.  Every 4-6 hours:  If on nasal continuous positive airway pressure, respiratory therapy assess nares and determine need for appliance change or resting period.  10/3/2024 1234 by Nita Lindsey RN  Outcome: Progressing  10/3/2024 0314 by Amy Thakur RN  Outcome: Progressing     Problem: Chronic Conditions and Co-morbidities  Goal: Patient's chronic conditions and co-morbidity symptoms are monitored and maintained or improved  10/3/2024 1234 by Nita Lindsey RN  Outcome: Progressing  10/3/2024 0314 by Amy Thakur RN  Outcome: Progressing     Problem: Nutrition Deficit:  Goal: Optimize nutritional status  10/3/2024 1234 by Nita Lindsey RN  Outcome: Progressing  10/3/2024 0314 by Amy Thakur RN  Outcome: Progressing     Problem: Musculoskeletal - Adult  Goal: Maintain proper alignment of affected body part  10/3/2024 1234 by Nita Lindsey RN  Outcome: Progressing  10/3/2024 0314 by Amy Thakur RN  Outcome: Progressing  Goal: Return ADL status to a safe level of function  10/3/2024 1234 by Nita Lindsey RN  Outcome: Progressing  10/3/2024 0314 by Amy Thakur RN  Outcome: Progressing     Problem: Genitourinary - Adult  Goal: Absence of urinary retention  10/3/2024 1234 by Nita Lindsey RN  Outcome: Progressing  10/3/2024 0314 by Blaze 
  Problem: Safety - Adult  Goal: Free from fall injury  10/5/2024 1135 by Malathi Koehler RN  Outcome: Progressing  10/5/2024 0612 by Amy Thakur RN  Outcome: Progressing     Problem: Pain  Goal: Verbalizes/displays adequate comfort level or baseline comfort level  10/5/2024 1135 by Malathi Koehler RN  Outcome: Progressing  10/5/2024 0612 by Amy Thakur RN  Outcome: Progressing     Problem: Skin/Tissue Integrity  Goal: Absence of new skin breakdown  Description: 1.  Monitor for areas of redness and/or skin breakdown  2.  Assess vascular access sites hourly  3.  Every 4-6 hours minimum:  Change oxygen saturation probe site  4.  Every 4-6 hours:  If on nasal continuous positive airway pressure, respiratory therapy assess nares and determine need for appliance change or resting period.  10/5/2024 1135 by Malathi Koehler RN  Outcome: Progressing  10/5/2024 0612 by Amy Thakur RN  Outcome: Progressing     Problem: Chronic Conditions and Co-morbidities  Goal: Patient's chronic conditions and co-morbidity symptoms are monitored and maintained or improved  10/5/2024 1135 by Malathi Koehler RN  Outcome: Progressing  10/5/2024 0612 by Amy Thakur RN  Outcome: Progressing     Problem: Nutrition Deficit:  Goal: Optimize nutritional status  10/5/2024 1135 by Malathi Koehler RN  Outcome: Progressing  10/5/2024 0612 by mAy Thakur RN  Outcome: Progressing     Problem: Musculoskeletal - Adult  Goal: Maintain proper alignment of affected body part  10/5/2024 1135 by Malathi Koehler RN  Outcome: Progressing  10/5/2024 0612 by Amy Thakur RN  Outcome: Progressing  Goal: Return ADL status to a safe level of function  10/5/2024 1135 by Malathi Koehler RN  Outcome: Progressing  10/5/2024 0612 by Amy Thakur RN  Outcome: Progressing     Problem: Genitourinary - Adult  Goal: Absence of urinary retention  10/5/2024 1135 by Malathi Koehler RN  Outcome: Progressing  10/5/2024 0612 by Amy Thakur 
  Problem: Safety - Adult  Goal: Free from fall injury  10/6/2024 0049 by Jade Lake RN  Outcome: Progressing  10/5/2024 1135 by Malathi Koehler RN  Outcome: Progressing     Problem: Pain  Goal: Verbalizes/displays adequate comfort level or baseline comfort level  10/6/2024 0049 by Jade Lake RN  Outcome: Progressing  10/5/2024 1135 by Malathi Koehler RN  Outcome: Progressing     Problem: Skin/Tissue Integrity  Goal: Absence of new skin breakdown  Description: 1.  Monitor for areas of redness and/or skin breakdown  2.  Assess vascular access sites hourly  3.  Every 4-6 hours minimum:  Change oxygen saturation probe site  4.  Every 4-6 hours:  If on nasal continuous positive airway pressure, respiratory therapy assess nares and determine need for appliance change or resting period.  10/6/2024 0049 by Jade Lake RN  Outcome: Progressing  10/5/2024 1135 by Malathi Koehler RN  Outcome: Progressing     Problem: Chronic Conditions and Co-morbidities  Goal: Patient's chronic conditions and co-morbidity symptoms are monitored and maintained or improved  10/6/2024 0049 by Jade Lake RN  Outcome: Progressing  10/5/2024 1135 by Malathi Koehler RN  Outcome: Progressing     Problem: Nutrition Deficit:  Goal: Optimize nutritional status  10/6/2024 0049 by Jade Lake RN  Outcome: Progressing  10/5/2024 1135 by Malathi Koehler RN  Outcome: Progressing     Problem: Musculoskeletal - Adult  Goal: Maintain proper alignment of affected body part  10/6/2024 0049 by Jade Lake RN  Outcome: Progressing  10/5/2024 1135 by Malathi Koehler RN  Outcome: Progressing  Goal: Return ADL status to a safe level of function  10/6/2024 0049 by Jade Lake RN  Outcome: Progressing  10/5/2024 1135 by Malathi Koehler RN  Outcome: Progressing     Problem: Genitourinary - Adult  Goal: Absence of urinary retention  10/6/2024 0049 by Jade Laek RN  Outcome: Progressing  10/5/2024 1135 by Malathi Koehler RN  Outcome: Progressing  Goal: Urinary 
  Problem: Safety - Adult  Goal: Free from fall injury  10/6/2024 2354 by Evelin Crook RN  Outcome: Progressing  10/6/2024 1445 by Malathi Koehler RN  Outcome: Progressing  10/6/2024 1444 by Malathi Koehler RN  Outcome: Progressing     Problem: Pain  Goal: Verbalizes/displays adequate comfort level or baseline comfort level  10/6/2024 2354 by Evelin Crook RN  Outcome: Progressing  10/6/2024 1445 by Malathi Koehler RN  Outcome: Progressing  10/6/2024 1444 by Malathi Koehler RN  Outcome: Progressing     Problem: Skin/Tissue Integrity  Goal: Absence of new skin breakdown  Description: 1.  Monitor for areas of redness and/or skin breakdown  2.  Assess vascular access sites hourly  3.  Every 4-6 hours minimum:  Change oxygen saturation probe site  4.  Every 4-6 hours:  If on nasal continuous positive airway pressure, respiratory therapy assess nares and determine need for appliance change or resting period.  10/6/2024 2354 by Evelin Crook RN  Outcome: Progressing  10/6/2024 1445 by Malathi Koehler RN  Outcome: Progressing  10/6/2024 1444 by Malathi Koehler RN  Outcome: Progressing     Problem: Chronic Conditions and Co-morbidities  Goal: Patient's chronic conditions and co-morbidity symptoms are monitored and maintained or improved  10/6/2024 2354 by Evelin Crook RN  Outcome: Progressing  10/6/2024 1445 by Malathi Koehler RN  Outcome: Progressing  10/6/2024 1444 by Malathi Koehler RN  Outcome: Progressing     Problem: Nutrition Deficit:  Goal: Optimize nutritional status  10/6/2024 2354 by Evelin Crook RN  Outcome: Progressing  10/6/2024 1445 by Malathi Koehler RN  Outcome: Progressing  10/6/2024 1444 by Malathi Koehler RN  Outcome: Progressing     Problem: Musculoskeletal - Adult  Goal: Maintain proper alignment of affected body part  10/6/2024 2354 by Evelin Crook RN  Outcome: Progressing  10/6/2024 1445 by Malathi Koehler RN  Outcome: Progressing  10/6/2024 1444 by Malathi Koehler RN  Outcome: Progressing  Goal: Return ADL status to a 
  Problem: Safety - Adult  Goal: Free from fall injury  10/7/2024 2150 by Evelin Crook RN  Outcome: Progressing  10/7/2024 1105 by Nita Lindsey RN  Outcome: Progressing     Problem: Pain  Goal: Verbalizes/displays adequate comfort level or baseline comfort level  10/7/2024 2150 by Evelin Crook RN  Outcome: Progressing  10/7/2024 1105 by Nita Lindsey RN  Outcome: Progressing     Problem: Skin/Tissue Integrity  Goal: Absence of new skin breakdown  Description: 1.  Monitor for areas of redness and/or skin breakdown  2.  Assess vascular access sites hourly  3.  Every 4-6 hours minimum:  Change oxygen saturation probe site  4.  Every 4-6 hours:  If on nasal continuous positive airway pressure, respiratory therapy assess nares and determine need for appliance change or resting period.  10/7/2024 2150 by Evelin Crook RN  Outcome: Progressing  10/7/2024 1105 by Nita Lindsey RN  Outcome: Progressing     Problem: Chronic Conditions and Co-morbidities  Goal: Patient's chronic conditions and co-morbidity symptoms are monitored and maintained or improved  10/7/2024 2150 by Evelin Crook RN  Outcome: Progressing  10/7/2024 1105 by Nita Lindsey RN  Outcome: Progressing     Problem: Nutrition Deficit:  Goal: Optimize nutritional status  10/7/2024 2150 by Evelin Crook RN  Outcome: Progressing  10/7/2024 1105 by Nita Lindsey RN  Outcome: Progressing     Problem: Musculoskeletal - Adult  Goal: Maintain proper alignment of affected body part  10/7/2024 2150 by Evelin Crook RN  Outcome: Progressing  10/7/2024 1105 by Nita Lindsey RN  Outcome: Progressing  Goal: Return ADL status to a safe level of function  10/7/2024 2150 by Evelin Corok RN  Outcome: Progressing  10/7/2024 1105 by Nita Lindsey RN  Outcome: Progressing     Problem: Genitourinary - Adult  Goal: Absence of urinary retention  10/7/2024 2150 by Evelin Crook RN  Outcome: Progressing  10/7/2024 1105 by Nita Lindsey RN  Outcome: 
  Problem: Safety - Adult  Goal: Free from fall injury  Outcome: Progressing     Problem: Pain  Goal: Verbalizes/displays adequate comfort level or baseline comfort level  Outcome: Progressing     Problem: Skin/Tissue Integrity  Goal: Absence of new skin breakdown  Description: 1.  Monitor for areas of redness and/or skin breakdown  2.  Assess vascular access sites hourly  3.  Every 4-6 hours minimum:  Change oxygen saturation probe site  4.  Every 4-6 hours:  If on nasal continuous positive airway pressure, respiratory therapy assess nares and determine need for appliance change or resting period.  Outcome: Progressing     Problem: Chronic Conditions and Co-morbidities  Goal: Patient's chronic conditions and co-morbidity symptoms are monitored and maintained or improved  Outcome: Progressing     Problem: Nutrition Deficit:  Goal: Optimize nutritional status  Outcome: Progressing     Problem: Musculoskeletal - Adult  Goal: Maintain proper alignment of affected body part  Outcome: Progressing  Goal: Return ADL status to a safe level of function  Outcome: Progressing     Problem: Genitourinary - Adult  Goal: Absence of urinary retention  Outcome: Progressing  Goal: Urinary catheter remains patent  Outcome: Progressing     Problem: Infection - Adult  Goal: Absence of infection at discharge  Outcome: Progressing     Problem: Hematologic - Adult  Goal: Maintains hematologic stability  Outcome: Progressing     
  Problem: Safety - Adult  Goal: Free from fall injury  Outcome: Progressing     Problem: Pain  Goal: Verbalizes/displays adequate comfort level or baseline comfort level  Outcome: Progressing     Problem: Skin/Tissue Integrity  Goal: Absence of new skin breakdown  Description: 1.  Monitor for areas of redness and/or skin breakdown  2.  Assess vascular access sites hourly  3.  Every 4-6 hours minimum:  Change oxygen saturation probe site  4.  Every 4-6 hours:  If on nasal continuous positive airway pressure, respiratory therapy assess nares and determine need for appliance change or resting period.  Outcome: Progressing     Problem: Chronic Conditions and Co-morbidities  Goal: Patient's chronic conditions and co-morbidity symptoms are monitored and maintained or improved  Outcome: Progressing     Problem: Nutrition Deficit:  Goal: Optimize nutritional status  Outcome: Progressing     Problem: Musculoskeletal - Adult  Goal: Maintain proper alignment of affected body part  Outcome: Progressing  Goal: Return ADL status to a safe level of function  Outcome: Progressing     Problem: Genitourinary - Adult  Goal: Absence of urinary retention  Outcome: Progressing  Goal: Urinary catheter remains patent  Outcome: Progressing     Problem: Infection - Adult  Goal: Absence of infection at discharge  Outcome: Progressing     Problem: Hematologic - Adult  Goal: Maintains hematologic stability  Outcome: Progressing     Problem: Respiratory - Adult  Goal: Achieves optimal ventilation and oxygenation  Outcome: Progressing     Problem: Cardiovascular - Adult  Goal: Maintains optimal cardiac output and hemodynamic stability  Outcome: Progressing  Goal: Absence of cardiac dysrhythmias or at baseline  Outcome: Progressing     Problem: Skin/Tissue Integrity - Adult  Goal: Incisions, wounds, or drain sites healing without S/S of infection  Outcome: Progressing     
  Problem: Safety - Adult  Goal: Free from fall injury  Outcome: Progressing     Problem: Pain  Goal: Verbalizes/displays adequate comfort level or baseline comfort level  Outcome: Progressing     Problem: Skin/Tissue Integrity  Goal: Absence of new skin breakdown  Description: 1.  Monitor for areas of redness and/or skin breakdown  2.  Assess vascular access sites hourly  3.  Every 4-6 hours minimum:  Change oxygen saturation probe site  4.  Every 4-6 hours:  If on nasal continuous positive airway pressure, respiratory therapy assess nares and determine need for appliance change or resting period.  Outcome: Progressing     Problem: Chronic Conditions and Co-morbidities  Goal: Patient's chronic conditions and co-morbidity symptoms are monitored and maintained or improved  Outcome: Progressing     Problem: Nutrition Deficit:  Goal: Optimize nutritional status  Outcome: Progressing     Problem: Musculoskeletal - Adult  Goal: Maintain proper alignment of affected body part  Outcome: Progressing  Goal: Return ADL status to a safe level of function  Outcome: Progressing     Problem: Genitourinary - Adult  Goal: Absence of urinary retention  Outcome: Progressing  Goal: Urinary catheter remains patent  Outcome: Progressing     Problem: Infection - Adult  Goal: Absence of infection at discharge  Outcome: Progressing     Problem: Hematologic - Adult  Goal: Maintains hematologic stability  Outcome: Progressing     Problem: Respiratory - Adult  Goal: Achieves optimal ventilation and oxygenation  Outcome: Progressing     Problem: Cardiovascular - Adult  Goal: Maintains optimal cardiac output and hemodynamic stability  Outcome: Progressing  Goal: Absence of cardiac dysrhythmias or at baseline  Outcome: Progressing     Problem: Skin/Tissue Integrity - Adult  Goal: Incisions, wounds, or drain sites healing without S/S of infection  Outcome: Progressing     
Shift assessment completed. Routine vitals stable. Scheduled medications given. Patient is awake, alert and oriented. Respirations are easy and unlabored. Patient does not appear to be in distress, resting comfortably at this time. Call light within reach.    Problem: Safety - Adult  Goal: Free from fall injury  10/8/2024 1026 by Babs Schwab RN  Outcome: Progressing  10/7/2024 2150 by Evelin Crook RN  Outcome: Progressing     Problem: Pain  Goal: Verbalizes/displays adequate comfort level or baseline comfort level  10/8/2024 1026 by Babs Schwab RN  Outcome: Progressing  10/7/2024 2150 by Evelin Crook RN  Outcome: Progressing     Problem: Skin/Tissue Integrity  Goal: Absence of new skin breakdown  Description: 1.  Monitor for areas of redness and/or skin breakdown  2.  Assess vascular access sites hourly  3.  Every 4-6 hours minimum:  Change oxygen saturation probe site  4.  Every 4-6 hours:  If on nasal continuous positive airway pressure, respiratory therapy assess nares and determine need for appliance change or resting period.  10/8/2024 1026 by Babs Schwab RN  Outcome: Progressing  10/7/2024 2150 by Evelin Crook RN  Outcome: Progressing     Problem: Chronic Conditions and Co-morbidities  Goal: Patient's chronic conditions and co-morbidity symptoms are monitored and maintained or improved  10/8/2024 1026 by Babs Schwab RN  Outcome: Progressing  Flowsheets (Taken 10/8/2024 1021)  Care Plan - Patient's Chronic Conditions and Co-Morbidity Symptoms are Monitored and Maintained or Improved:   Monitor and assess patient's chronic conditions and comorbid symptoms for stability, deterioration, or improvement   Collaborate with multidisciplinary team to address chronic and comorbid conditions and prevent exacerbation or deterioration  10/7/2024 2150 by Evelin Crook RN  Outcome: Progressing     Problem: Nutrition Deficit:  Goal: Optimize nutritional status  10/8/2024 1026 by Josselin 
Koehler, Malathi, RN  Outcome: Progressing  Goal: Urinary catheter remains patent  10/5/2024 0612 by Amy Thakur RN  Outcome: Progressing  10/4/2024 1923 by Malathi Koehler RN  Outcome: Progressing     Problem: Infection - Adult  Goal: Absence of infection at discharge  10/5/2024 0612 by Amy Thakur RN  Outcome: Progressing  10/4/2024 1923 by Malathi Koehelr RN  Outcome: Progressing     Problem: Hematologic - Adult  Goal: Maintains hematologic stability  10/5/2024 0612 by Amy Thakur RN  Outcome: Progressing  10/4/2024 1923 by Malathi Koehler RN  Outcome: Progressing     Problem: Respiratory - Adult  Goal: Achieves optimal ventilation and oxygenation  10/5/2024 0612 by Amy Thakur RN  Outcome: Progressing  10/4/2024 1923 by Malathi Koehler RN  Outcome: Progressing     Problem: Cardiovascular - Adult  Goal: Maintains optimal cardiac output and hemodynamic stability  10/5/2024 0612 by Amy Thakur RN  Outcome: Progressing  10/4/2024 1923 by Malathi Koehler RN  Outcome: Progressing  Goal: Absence of cardiac dysrhythmias or at baseline  10/5/2024 0612 by Amy Thakur RN  Outcome: Progressing  10/4/2024 1923 by Malathi Koehler RN  Outcome: Progressing     Problem: Skin/Tissue Integrity - Adult  Goal: Incisions, wounds, or drain sites healing without S/S of infection  10/5/2024 0612 by Amy Thakur RN  Outcome: Progressing  10/4/2024 1923 by Malathi Koehler RN  Outcome: Progressing     
Progressing  Goal: Urinary catheter remains patent  Outcome: Progressing     Problem: Infection - Adult  Goal: Absence of infection at discharge  10/7/2024 1105 by Nita Lindsey RN  Outcome: Progressing  10/6/2024 2354 by Evelin Crook RN  Outcome: Progressing     Problem: Hematologic - Adult  Goal: Maintains hematologic stability  10/7/2024 1105 by Nita Lindsey RN  Outcome: Progressing  10/6/2024 2354 by Evelin Crook RN  Outcome: Progressing     Problem: Respiratory - Adult  Goal: Achieves optimal ventilation and oxygenation  10/7/2024 1105 by Nita Lindsey RN  Outcome: Progressing  10/6/2024 2354 by Evelin Crook RN  Outcome: Progressing     Problem: Cardiovascular - Adult  Goal: Maintains optimal cardiac output and hemodynamic stability  Outcome: Progressing  Goal: Absence of cardiac dysrhythmias or at baseline  Outcome: Progressing     Problem: Skin/Tissue Integrity - Adult  Goal: Incisions, wounds, or drain sites healing without S/S of infection  Outcome: Progressing     Problem: Discharge Planning  Goal: Discharge to home or other facility with appropriate resources  Outcome: Progressing     
of function  10/6/2024 1445 by Malathi Koehler RN  Outcome: Progressing  10/6/2024 1444 by Malathi Koehler RN  Outcome: Progressing  10/6/2024 0049 by Jade Lake RN  Outcome: Progressing     Problem: Genitourinary - Adult  Goal: Absence of urinary retention  10/6/2024 1445 by Malathi Koehler RN  Outcome: Progressing  10/6/2024 1444 by Malathi Koehler RN  Outcome: Progressing  10/6/2024 0049 by Jade Lake RN  Outcome: Progressing  Goal: Urinary catheter remains patent  10/6/2024 1445 by Malathi Koehler RN  Outcome: Progressing  10/6/2024 1444 by Malathi Koehler RN  Outcome: Progressing  10/6/2024 0049 by Jade Lake RN  Outcome: Progressing     Problem: Infection - Adult  Goal: Absence of infection at discharge  10/6/2024 1445 by Malathi Koehler RN  Outcome: Progressing  10/6/2024 1444 by Malathi Koehler RN  Outcome: Progressing  10/6/2024 0049 by Jade Lake RN  Outcome: Progressing     Problem: Hematologic - Adult  Goal: Maintains hematologic stability  10/6/2024 1445 by Malathi Koehler RN  Outcome: Progressing  10/6/2024 1444 by Malathi Koehler RN  Outcome: Progressing  10/6/2024 0049 by Jade Lake RN  Outcome: Progressing     Problem: Respiratory - Adult  Goal: Achieves optimal ventilation and oxygenation  10/6/2024 1445 by Malathi Koehler RN  Outcome: Progressing  10/6/2024 1444 by Malathi Koehler RN  Outcome: Progressing  10/6/2024 0049 by Jade Lake RN  Outcome: Progressing     Problem: Cardiovascular - Adult  Goal: Maintains optimal cardiac output and hemodynamic stability  10/6/2024 1445 by Malathi Koehler RN  Outcome: Progressing  10/6/2024 1444 by Malathi Koehler RN  Outcome: Progressing  10/6/2024 0049 by Jade Lake RN  Outcome: Progressing  Goal: Absence of cardiac dysrhythmias or at baseline  10/6/2024 1445 by Malathi Koehler RN  Outcome: Progressing  10/6/2024 1444 by Malathi Koehler RN  Outcome: Progressing  10/6/2024 0049 by Aleksandra, Jade, RN  Outcome: Progressing     Problem: Skin/Tissue Integrity - Adult  Goal: Incisions,

## 2024-10-08 NOTE — DISCHARGE SUMMARY
Hospital Medicine Discharge Summary    Patient: Tye Resendez     Gender: male  : 1950   Age: 73 y.o.  MRN: [id number]    Admitting Physician: Clara Jones MD  Discharge Physician: Clara Jones MD     Code Status: Full Code    Admit Date: 10/1/2024   Discharge Date:   10/8/2024    Disposition:  Home  Time spent arranging discharge: 31 minutes    Discharge Diagnoses:    Active Hospital Problems    Diagnosis Date Noted    Abscess of left leg [L02.416] 10/06/2024    Leg mass, left [R22.42] 10/06/2024    Soft tissue mass [M79.89] 10/01/2024         Condition at Discharge:  Stable    Hospital Course:   With left lower extremity cellulitis underwent I&D cultures came back for staph along with possible underlying tumor pathology was sent patient was cleared for discharge by Ortho will follow-up with Ortho for path results discharged on course of  oral antibiotics to complete therapy  Patient DIMA on CKD for which improved  Elevated LFTs seen by GI likely secondary antibiotics improving would repeat liver function test on next office visit    Patient had kidney mass seen by urology and MRI likely benign did have urinary retention will follow-up with urology as outpatient  Discharge Exam:    /69   Pulse 78   Temp 97.4 °F (36.3 °C) (Oral)   Resp 18   Ht 1.727 m (5' 8\")   Wt 93.4 kg (206 lb)   SpO2 95%   BMI 31.32 kg/m²   General appearance:  Appears comfortable. AAOx3  HEENT: atraumatic, Pupils equal, muscous membranes moist, no masses appreciated  Cardiovascular: Regular rate and rhythm no murmurs appreciated  Respiratory: CTAB no wheezing  Gastrointestinal: Abdomen soft, non-tender, BS+  EXT: no edema  Neurology: no gross focal deficts  Psychiatry: Appropriate affect. Not agitated  Skin:left leg dressin clean    Discharge Medications:   Current Discharge Medication List        START taking these medications    Details   aspirin 325 MG EC tablet Take 1 tablet by mouth in the morning and

## 2024-10-08 NOTE — CARE COORDINATION
Case Management -  Discharge Note      Patient Name: Tye Resendez                   YOB: 1950            Readmission Risk (Low < 19, Mod (19-27), High > 27): Readmission Risk Score: 21.6    Current PCP: Chris Persaud MD      (IMM) Important Message from Medicare:    Has pt received appropriate IMM before discharge if required: yes  Date: 10/08/2024    PT AM-PAC: 18 /24  OT AM-PAC: 18 /24    Patient/patient representative has been educated on the benefits of HHC as well as the possible risks of declining recommended services. Patient/patient representative has acknowledged the information provided and decided on the following discharge plan. Patient/ patient representative has been provided freedom of choice regarding service provider, supported by basic dialogue that supports the patient's individualized plan of care/goals.    [facility]  [address]  [place]  [address]  Phone: [phone]  Fax:[phone]    *[facility] - [phone]    Financial    Payor: [facility] MEDICARE / Plan: ANTHEM MEDIBLUE ESSENTIAL/PLUS / Product Type: *No Product type* /     Pharmacy:  Potential assistance Purchasing Medications:    Meds-to-Beds request: [id number] - [place], [place] - [address] [phone] - F [phone]  [address] [address]  Phone: [phone] Fax: [phone]    [facility] - [place], [place] - [address] [phone] - F [phone]  [address] [address]  Phone: [phone] Fax: [phone]      Notes:    Additional Case Management Notes:  Pt to discharge home today back to assisted living at [facility]).  [facility] up C authorization and services through [facility] for patient.  Called [facility] today and left her a message that patient was discharging.  No

## 2024-10-08 NOTE — CONSULTS
MD Darell Vigil MD Aldo Estella,               Office: [phone]                      Fax: [phone]               [place]                     Nephrology consult received. Full consult report will follow.   Thank you for allowing us to participate in this patient's care. Please do not hesitate to contact us anytime. We will follow along with you.       Darell Luis MD  [facility]   [phone] or [employer].    
  Urology Consult Note  [facility]     Patient: Tye Resendez MRN: [id number]  Room/Bed: 5TN-5576/5576-01   YOB: 1950  Age/Sex: 73 y.o.male  Admission Date: 10/1/2024     Date of Service:  10/2/2024    Consulting Provider: Joni Miller PA-C  Admitting/Requesting Physician: Clara Jones MD  Primary Care Physician: Chris Persaud MD    Reason for Consult: BPH    ASSESSMENT/PLAN     72 yo male   Admitted with LLE infection with underlying mass  Urology consulted for  BPH  Pt reports hx BPH and chronic outlet obstruction. He has a history of bilateral hydronephrosis. [facility]. He has been catheter dependent for some time. He currently has an indwelling kinney that was exchanged a day ago. Urine appears clear yellow. 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 Cr 2.8    Recommendations:  Continue kinney. Continue flomax. Not sure how well he voids at baseline. He has been catheter dependent for some time. Will plan to see him in our office for VT vs kinney exchange. I discussed SPT for further management but may consider UDS for further evaluation. Patient did seem open to the idea of a SPT.  Nothing else planned from a urology standpoint. I have requested follow up. Call with any questions. Will follow peripherally.     All patient questions were answered. He understands the plan as listed above.    HISTORY     Chief Complaint:   Chief Complaint   Patient presents with    Leg Pain     Pt arrives from home by wife. Pt C/O L lower leg pain for 4 days.        History of Present Illness: Tye Resendez is a 73 y.o. male with BPH, retention. Onset of symptoms was days ago with improved course since that time. Symptoms are aggravated by chronic outlet obstruction. Symptoms improved with new kinney and flomax. Patient also reports he has been catheter dependent for some time. He has tried the following treatments: flomax, kinney     Past Medical History:  He 
effusion  Sensation: Grossly intact to light touch throughout the left lower extremity in all nerve distributions.  Vascular:  1+ left DP pulse.            Labs reviewed:  Recent Labs     10/01/24  1242 10/02/24  0537   WBC 13.1* 11.8*   HGB 12.2* 12.2*   HCT 38.8* 36.3*   * 491*     Recent Labs     10/01/24  1242 10/02/24  0537   * 137   K 4.1 4.4    106   CO2 15* 14*   BUN 47* 44*   CREATININE 3.2* 2.8*   GLUCOSE 204* 116*   CALCIUM 9.2 9.2     No results for input(s): \"INR\", \"PROTIME\" in the last 72 hours.    Lab Results   Component Value Date    COLORU Yellow 08/31/2024    CLARITYU TURBID (A) 08/31/2024    PHUR 6.0 08/31/2024    GLUCOSEU Negative 08/31/2024    BLOODU LARGE (A) 08/31/2024    LEUKOCYTESUR LARGE (A) 08/31/2024    BILIRUBINUR Negative 08/31/2024    UROBILINOGEN 0.2 08/31/2024    RBCUA 108 (H) 08/31/2024    WBCUA 2950 (H) 08/31/2024    BACTERIA 4+ (A) 08/31/2024       Imaging:  CT TIBIA FIBULA LEFT WO CONTRAST   Final Result   1.  Heterogenous soft tissue mass in the anterior lower leg soft tissues.   Portions of the mass are heavily calcified.  The differential includes an   involving hematoma as well as other soft tissue masses.  Recommend further   evaluation with MRI using soft tissue mass protocol.      2.  No definite evidence of acute fracture or osteomyelitis.  No definite   abscess or soft tissue gas.      3.  Chronic appearing remodeling of the knee joint likely related to a remote   injury.         Vascular duplex lower extremity venous left   Final Result      MRI TIBIA FIBULA LEFT W WO CONTRAST    (Results Pending)       IMPRESSION:  LEFT lower leg mass with extensive trauma history with overlying cellulitis  hx of left knee fusion  Bipolar/schizophrenia  Dementia  Principal Problem:    Soft tissue mass  Resolved Problems:    * No resolved hospital problems. *      RECOMMENDATIONS:  MRI left tib/fib already ordered  Nothing to aspirate/drain based on CT.  No plans on 
10/03/2024 01:11 PM    NITRU Negative 10/03/2024 01:11 PM    LEUKOCYTESUR LARGE 10/03/2024 01:11 PM     Urine culture: No results for input(s): \"LABURIN\" in the last 72 hours.  PSA: No results found for: \"PSA\"      IMAGING     MRI ABDOMEN W WO CONTRAST    Result Date: 10/7/2024  EXAMINATION: MRI OF THE ABDOMEN WITHOUT AND WITH CONTRAST, 10/7/2024 12:12 pm TECHNIQUE: Multiplanar multisequence MRI of the abdomen was performed without and with the administration of intravenous contrast. COMPARISON: 08/31/2024, 10/04/2024 HISTORY: ORDERING SYSTEM PROVIDED HISTORY: kidney mass TECHNOLOGIST PROVIDED HISTORY: Reason for exam:->kidney mass Specify organ?->Kidneys Reason for Exam: kidney mass FINDINGS: Exam is moderately degraded by motion. Lower chest: Unremarkable. Organs: Liver is unremarkable in contour, signal and enhancement. Gallbladder is unremarkable.  No biliary dilatation.  Pancreas, adrenals, spleen, vasculature unremarkable.  Bandlike endophytic tissue at the interpolar right kidney on series 10, image 92 corresponding to the sonographic finding with signal and enhancement characteristics that follow adjacent renal parenchyma.  Mild renal atrophy.. GI/Bowel: Visualized bowel is nondilated without wall thickening.  Few colonic diverticula. Peritoneum/Retroperitoneum: No free air, free fluid, organized fluid collection, lymphadenopathy. Bones/Soft Tissues: Mild degenerative disc disease.     Bandlike endophytic tissue at the interpolar right kidney corresponding to the sonographic finding with signal and enhancement characteristics that follow adjacent renal parenchyma.  This appears to be a benign column of Cesar.     US LIVER    Result Date: 10/4/2024  EXAMINATION: RIGHT UPPER QUADRANT ULTRASOUND 10/4/2024 1:15 pm COMPARISON: Abdomen CT 08/31/2024 HISTORY: ORDERING SYSTEM PROVIDED HISTORY: eelavted lfts TECHNOLOGIST PROVIDED HISTORY: Reason for exam:->eelavted lfts Reason for Exam: Elevated LFTs Additional 
protocol. 2.  No definite evidence of acute fracture or osteomyelitis.  No definite abscess or soft tissue gas. 3.  Chronic appearing remodeling of the knee joint likely related to a remote injury.           Imaging: [unfilled]         ======================================================================  Please note that this chart entry has been generated using voice recognition software, mainly.  So please excuse brevity and/or typos.  While every effort and attempts have been made to ensure the accuracy of this automated transcription, some errors may have occurred; and certain words and phrases in transcription may not be entered as intended.  However, inadvertent computerized transcription errors may be present.  So please contact us if any clarification needed.

## 2024-10-08 NOTE — CARE COORDINATION
10/08/24 1531   IMM Letter   IMM Letter given to Patient/Family/Significant other/Guardian/POA/by: IMM given by CM   IMM Letter date given: 10/08/24   IMM Letter time given: 1520

## 2024-10-10 LAB
A1AT PHENOTYP SERPL-IMP: ABNORMAL
A1AT SERPL-MCNC: 289 MG/DL (ref 90–200)
MITOCHONDRIA M2 AB SER IA-ACNC: 2.2 U/ML (ref 0–4)

## 2024-10-11 LAB
BACTERIA SPEC AEROBE CULT: ABNORMAL
BACTERIA SPEC ANAEROBE CULT: ABNORMAL
GRAM STN SPEC: ABNORMAL
ORGANISM: ABNORMAL

## 2024-10-22 ENCOUNTER — TELEPHONE (OUTPATIENT)
Dept: ORTHOPEDIC SURGERY | Age: 74
End: 2024-10-22

## 2024-10-22 ENCOUNTER — OFFICE VISIT (OUTPATIENT)
Dept: ORTHOPEDIC SURGERY | Age: 74
End: 2024-10-22

## 2024-10-22 VITALS — BODY MASS INDEX: 31.22 KG/M2 | HEIGHT: 68 IN | WEIGHT: 206 LBS

## 2024-10-22 DIAGNOSIS — L02.416 ABSCESS OF LEFT LEG: Primary | ICD-10-CM

## 2024-10-22 DIAGNOSIS — R22.42 LEG MASS, LEFT: ICD-10-CM

## 2024-10-22 PROCEDURE — 99024 POSTOP FOLLOW-UP VISIT: CPT | Performed by: NURSE PRACTITIONER

## 2024-10-22 NOTE — TELEPHONE ENCOUNTER
General Question     Subject: PATIENT CANE  Patient and /or Facility Request: Tye Resendez   Contact Number: [phone]    DAUGHTER, MERCY CALLING TO LET THE OFFICE KNOW, THAT HER DAD WAS JUST IN TO SEE KRISTYN DIANA AND HE LEFT HIS CANE.    THE CANE IS SHAPE LIKE A SKI POLE.    MERCY WILL BE IN THE OFFICE TO PICK IT UP TOMORROW MORNING.    PLEASE CALL THE MERCY AT THE ABOVE NUMBER.

## 2024-10-22 NOTE — PROGRESS NOTES
DIAGNOSIS:  Left leg deep mass and abscess, s/p excision mass and  incision and drainage.    DATE OF SURGERY:  10/6/2024.    HISTORY OF PRESENT ILLNESS:  Mr. Resendez 74 y.o.  male who came in today for 2 weeks postoperative visit.  The patient denies any significant pain in the left leg. Rates pain a 0/10 VAS and doing better. He has been WBAT.  No numbness or tingling sensation. No fever or Chills. He grew up MSSA and on PO antibiotics. He has a h/o left knee fusion and chronic left foot drop since 1971. He does not use an AFO.     PHYSICAL EXAMINATION:  The incision is open and deep.  No signs of any erythema or drainage, moderate swelling. He has no pain with the active or passive range of motion of the left knee, secondary to the knee fusion. Left foot drop.  He has intact sensation distally, and he is neurovascularly intact.          Surgical pathology consistent with reactive fibrous tissue with associated acute/chronic inflammation and dystrophic calcifications.       IMPRESSION:  2 weeks out from left left deep mass and abscess, s/p excision mass and  incision and drainage, and doing very well.    PLAN: He can go back to normal activity with no heavy impact activities. Continue dressing change with wet to dry and packing, applied today and instructed in care. Continue wound care. The patient will come back for a follow up in 4 weeks.      The patient understands that there is a chance of abscess recurrence even after surgical I&D.    Letha Desir, JOSE - CNP

## 2024-10-23 ENCOUNTER — TELEPHONE (OUTPATIENT)
Dept: ORTHOPEDIC SURGERY | Age: 74
End: 2024-10-23

## 2024-10-23 ENCOUNTER — HOSPITAL ENCOUNTER (OUTPATIENT)
Dept: CT IMAGING | Age: 74
Discharge: HOME | End: 2024-10-23
Attending: INTERNAL MEDICINE
Payer: MEDICARE

## 2024-10-23 DIAGNOSIS — N40.0 BENIGN PROSTATIC HYPERPLASIA WITHOUT LOWER URINARY TRACT SYMPTOMS: ICD-10-CM

## 2024-10-23 DIAGNOSIS — N13.39 OTHER HYDRONEPHROSIS: ICD-10-CM

## 2024-10-23 DIAGNOSIS — N18.32 CHRONIC KIDNEY DISEASE, STAGE 3B (HCC): ICD-10-CM

## 2024-10-23 DIAGNOSIS — Z09 HOSPITAL DISCHARGE FOLLOW-UP: ICD-10-CM

## 2024-10-23 DIAGNOSIS — I12.9 BENIGN HYPERTENSION WITH STAGE 3B CHRONIC KIDNEY DISEASE (HCC): ICD-10-CM

## 2024-10-23 DIAGNOSIS — N18.32 BENIGN HYPERTENSION WITH STAGE 3B CHRONIC KIDNEY DISEASE (HCC): ICD-10-CM

## 2024-10-23 DIAGNOSIS — R22.42 LEG MASS, LEFT: ICD-10-CM

## 2024-10-23 DIAGNOSIS — L02.416 ABSCESS OF LEFT LEG: Primary | ICD-10-CM

## 2024-10-23 PROCEDURE — 74176 CT ABD & PELVIS W/O CONTRAST: CPT

## 2024-10-23 NOTE — TELEPHONE ENCOUNTER
CLAUDY winkler. She requested that I send the order to [phone]  also.     Order faxed to [phone]  as requested.

## 2024-10-23 NOTE — TELEPHONE ENCOUNTER
[facility] Wilson called [facility]- RN assisting to get wound care orders sent to  [facility] nursing agency,[facility] ASAP  [place]: [address]ielo CLARK# [phone]  F# [phone] to send order    Or IF needed please to send to Genna at   F# [phone]

## 2024-11-01 ENCOUNTER — TELEPHONE (OUTPATIENT)
Dept: ORTHOPEDIC SURGERY | Age: 74
End: 2024-11-01

## 2024-11-01 NOTE — TELEPHONE ENCOUNTER
Medical Facility Question     Facility Name: [facility]  Contact Name:   Contact Number: PLEASE CONTACT PATIENT WIFE [phone]   Request or Information: WANTS TO KNOW IF PATIENT CAN GET REFERRED TO A WOUND CLINIC IN [place].

## 2024-11-04 NOTE — TELEPHONE ENCOUNTER
Referral was placed on 10/23/24.     [facility] [place] [address]  [phone]     [facility] for patient with information to call and schedule wound care apt.

## 2024-11-09 ENCOUNTER — APPOINTMENT (OUTPATIENT)
Dept: GENERAL RADIOLOGY | Age: 74
DRG: 698 | End: 2024-11-09
Payer: OTHER GOVERNMENT

## 2024-11-09 ENCOUNTER — HOSPITAL ENCOUNTER (INPATIENT)
Age: 74
LOS: 2 days | Discharge: HOME HEALTH | DRG: 698 | End: 2024-11-11
Attending: INTERNAL MEDICINE | Admitting: INTERNAL MEDICINE
Payer: OTHER GOVERNMENT

## 2024-11-09 ENCOUNTER — APPOINTMENT (OUTPATIENT)
Dept: CT IMAGING | Age: 74
DRG: 698 | End: 2024-11-09
Payer: OTHER GOVERNMENT

## 2024-11-09 DIAGNOSIS — A41.9 SEVERE SEPSIS (HCC): Primary | ICD-10-CM

## 2024-11-09 DIAGNOSIS — R65.20 SEVERE SEPSIS (HCC): Primary | ICD-10-CM

## 2024-11-09 DIAGNOSIS — N18.9 CHRONIC KIDNEY DISEASE, UNSPECIFIED CKD STAGE: ICD-10-CM

## 2024-11-09 DIAGNOSIS — N30.01 ACUTE CYSTITIS WITH HEMATURIA: ICD-10-CM

## 2024-11-09 DIAGNOSIS — R33.9 URINARY RETENTION: ICD-10-CM

## 2024-11-09 DIAGNOSIS — N13.30 HYDROURETERONEPHROSIS: ICD-10-CM

## 2024-11-09 PROBLEM — N39.0 UTI (URINARY TRACT INFECTION): Status: ACTIVE | Noted: 2024-11-09

## 2024-11-09 LAB
ALBUMIN SERPL-MCNC: 3.4 G/DL (ref 3.4–5)
ALBUMIN SERPL-MCNC: 3.8 G/DL (ref 3.4–5)
ALBUMIN/GLOB SERPL: 1 {RATIO} (ref 1.1–2.2)
ALBUMIN/GLOB SERPL: 1.1 {RATIO} (ref 1.1–2.2)
ALP SERPL-CCNC: 89 U/L (ref 40–129)
ALP SERPL-CCNC: 99 U/L (ref 40–129)
ALT SERPL-CCNC: 15 U/L (ref 10–40)
ALT SERPL-CCNC: 19 U/L (ref 10–40)
ANION GAP SERPL CALCULATED.3IONS-SCNC: 12 MMOL/L (ref 3–16)
ANION GAP SERPL CALCULATED.3IONS-SCNC: 13 MMOL/L (ref 3–16)
ANISOCYTOSIS BLD QL SMEAR: ABNORMAL
AST SERPL-CCNC: 18 U/L (ref 15–37)
AST SERPL-CCNC: 28 U/L (ref 15–37)
BACTERIA URNS QL MICRO: ABNORMAL /HPF
BASE EXCESS BLDV CALC-SCNC: -3.9 MMOL/L (ref -3–3)
BASOPHILS # BLD: 0 K/UL (ref 0–0.2)
BASOPHILS # BLD: 0.1 K/UL (ref 0–0.2)
BASOPHILS NFR BLD: 0 %
BASOPHILS NFR BLD: 0.6 %
BILIRUB SERPL-MCNC: 0.7 MG/DL (ref 0–1)
BILIRUB SERPL-MCNC: 0.7 MG/DL (ref 0–1)
BILIRUB UR QL STRIP.AUTO: NEGATIVE
BUN SERPL-MCNC: 38 MG/DL (ref 7–20)
BUN SERPL-MCNC: 41 MG/DL (ref 7–20)
CALCIUM SERPL-MCNC: 8.7 MG/DL (ref 8.3–10.6)
CALCIUM SERPL-MCNC: 9.6 MG/DL (ref 8.3–10.6)
CHLORIDE SERPL-SCNC: 107 MMOL/L (ref 99–110)
CHLORIDE SERPL-SCNC: 110 MMOL/L (ref 99–110)
CLARITY UR: ABNORMAL
CO2 BLDV-SCNC: 52 MMOL/L
CO2 SERPL-SCNC: 17 MMOL/L (ref 21–32)
CO2 SERPL-SCNC: 18 MMOL/L (ref 21–32)
COHGB MFR BLDV: 2.8 % (ref 0–1.5)
COLOR UR: YELLOW
CREAT SERPL-MCNC: 2.5 MG/DL (ref 0.8–1.3)
CREAT SERPL-MCNC: 2.6 MG/DL (ref 0.8–1.3)
DEPRECATED RDW RBC AUTO: 17.4 % (ref 12.4–15.4)
DEPRECATED RDW RBC AUTO: 17.5 % (ref 12.4–15.4)
DO-HGB MFR BLDV: 15 %
EKG ATRIAL RATE: 115 BPM
EKG DIAGNOSIS: NORMAL
EKG P AXIS: 50 DEGREES
EKG P-R INTERVAL: 154 MS
EKG Q-T INTERVAL: 316 MS
EKG QRS DURATION: 74 MS
EKG QTC CALCULATION (BAZETT): 437 MS
EKG R AXIS: 3 DEGREES
EKG T AXIS: 66 DEGREES
EKG VENTRICULAR RATE: 115 BPM
EOSINOPHIL # BLD: 0 K/UL (ref 0–0.6)
EOSINOPHIL # BLD: 0.4 K/UL (ref 0–0.6)
EOSINOPHIL NFR BLD: 0 %
EOSINOPHIL NFR BLD: 3.6 %
EPI CELLS #/AREA URNS AUTO: 0 /HPF (ref 0–5)
FLUAV RNA RESP QL NAA+PROBE: NOT DETECTED
FLUBV RNA RESP QL NAA+PROBE: NOT DETECTED
GFR SERPLBLD CREATININE-BSD FMLA CKD-EPI: 25 ML/MIN/{1.73_M2}
GFR SERPLBLD CREATININE-BSD FMLA CKD-EPI: 26 ML/MIN/{1.73_M2}
GLUCOSE SERPL-MCNC: 120 MG/DL (ref 70–99)
GLUCOSE SERPL-MCNC: 130 MG/DL (ref 70–99)
GLUCOSE UR STRIP.AUTO-MCNC: NEGATIVE MG/DL
HCO3 BLDV-SCNC: 21.8 MMOL/L (ref 23–29)
HCT VFR BLD AUTO: 38 % (ref 40.5–52.5)
HCT VFR BLD AUTO: 42.7 % (ref 40.5–52.5)
HGB BLD-MCNC: 12.1 G/DL (ref 13.5–17.5)
HGB BLD-MCNC: 13.7 G/DL (ref 13.5–17.5)
HGB UR QL STRIP.AUTO: ABNORMAL
HYALINE CASTS #/AREA URNS AUTO: 1 /LPF (ref 0–8)
KETONES UR STRIP.AUTO-MCNC: NEGATIVE MG/DL
LACTATE BLDV-SCNC: 1.5 MMOL/L (ref 0.4–1.9)
LACTATE BLDV-SCNC: 2 MMOL/L (ref 0.4–1.9)
LEUKOCYTE ESTERASE UR QL STRIP.AUTO: ABNORMAL
LYMPHOCYTES # BLD: 0.4 K/UL (ref 1–5.1)
LYMPHOCYTES # BLD: 0.6 K/UL (ref 1–5.1)
LYMPHOCYTES NFR BLD: 2 %
LYMPHOCYTES NFR BLD: 5.7 %
MCH RBC QN AUTO: 28.2 PG (ref 26–34)
MCH RBC QN AUTO: 28.8 PG (ref 26–34)
MCHC RBC AUTO-ENTMCNC: 31.9 G/DL (ref 31–36)
MCHC RBC AUTO-ENTMCNC: 32.2 G/DL (ref 31–36)
MCV RBC AUTO: 88.3 FL (ref 80–100)
MCV RBC AUTO: 89.5 FL (ref 80–100)
METHGB MFR BLDV: 0.7 %
MONOCYTES # BLD: 0.1 K/UL (ref 0–1.3)
MONOCYTES # BLD: 1.5 K/UL (ref 0–1.3)
MONOCYTES NFR BLD: 0.8 %
MONOCYTES NFR BLD: 8 %
NEUTROPHILS # BLD: 17.4 K/UL (ref 1.7–7.7)
NEUTROPHILS # BLD: 9.1 K/UL (ref 1.7–7.7)
NEUTROPHILS NFR BLD: 84 %
NEUTROPHILS NFR BLD: 89.3 %
NEUTS BAND NFR BLD MANUAL: 6 % (ref 0–7)
NITRITE UR QL STRIP.AUTO: NEGATIVE
NT-PROBNP SERPL-MCNC: 302 PG/ML (ref 0–449)
O2 CT VFR BLDV CALC: 16 VOL %
O2 THERAPY: ABNORMAL
PCO2 BLDV: 41.2 MMHG (ref 40–50)
PH BLDV: 7.33 [PH] (ref 7.35–7.45)
PH UR STRIP.AUTO: 6 [PH] (ref 5–8)
PLATELET # BLD AUTO: 235 K/UL (ref 135–450)
PLATELET # BLD AUTO: 244 K/UL (ref 135–450)
PLATELET BLD QL SMEAR: ADEQUATE
PMV BLD AUTO: 7.3 FL (ref 5–10.5)
PMV BLD AUTO: 7.3 FL (ref 5–10.5)
PO2 BLDV: 49.5 MMHG (ref 25–40)
POLYCHROMASIA BLD QL SMEAR: ABNORMAL
POTASSIUM SERPL-SCNC: 4 MMOL/L (ref 3.5–5.1)
POTASSIUM SERPL-SCNC: 4.5 MMOL/L (ref 3.5–5.1)
PROCALCITONIN SERPL IA-MCNC: 0.16 NG/ML (ref 0–0.15)
PROT SERPL-MCNC: 6.6 G/DL (ref 6.4–8.2)
PROT SERPL-MCNC: 7.5 G/DL (ref 6.4–8.2)
PROT UR STRIP.AUTO-MCNC: 100 MG/DL
RBC # BLD AUTO: 4.3 M/UL (ref 4.2–5.9)
RBC # BLD AUTO: 4.77 M/UL (ref 4.2–5.9)
RBC CLUMPS #/AREA URNS AUTO: 95 /HPF (ref 0–4)
REASON FOR REJECTION: NORMAL
REJECTED TEST: NORMAL
SAO2 % BLDV: 84 %
SARS-COV-2 RNA RESP QL NAA+PROBE: NOT DETECTED
SLIDE REVIEW: ABNORMAL
SODIUM SERPL-SCNC: 138 MMOL/L (ref 136–145)
SODIUM SERPL-SCNC: 139 MMOL/L (ref 136–145)
SP GR UR STRIP.AUTO: 1.01 (ref 1–1.03)
TROPONIN, HIGH SENSITIVITY: 16 NG/L (ref 0–22)
UA COMPLETE W REFLEX CULTURE PNL UR: YES
UA DIPSTICK W REFLEX MICRO PNL UR: YES
URN SPEC COLLECT METH UR: ABNORMAL
UROBILINOGEN UR STRIP-ACNC: 0.2 E.U./DL
WBC # BLD AUTO: 10.2 K/UL (ref 4–11)
WBC # BLD AUTO: 19.3 K/UL (ref 4–11)
WBC #/AREA URNS AUTO: 303 /HPF (ref 0–5)

## 2024-11-09 PROCEDURE — 93010 ELECTROCARDIOGRAM REPORT: CPT | Performed by: INTERNAL MEDICINE

## 2024-11-09 PROCEDURE — 6360000002 HC RX W HCPCS: Performed by: NURSE PRACTITIONER

## 2024-11-09 PROCEDURE — 2580000003 HC RX 258: Performed by: INTERNAL MEDICINE

## 2024-11-09 PROCEDURE — 85025 COMPLETE CBC W/AUTO DIFF WBC: CPT

## 2024-11-09 PROCEDURE — 96375 TX/PRO/DX INJ NEW DRUG ADDON: CPT

## 2024-11-09 PROCEDURE — 83880 ASSAY OF NATRIURETIC PEPTIDE: CPT

## 2024-11-09 PROCEDURE — 71045 X-RAY EXAM CHEST 1 VIEW: CPT

## 2024-11-09 PROCEDURE — 82803 BLOOD GASES ANY COMBINATION: CPT

## 2024-11-09 PROCEDURE — 87086 URINE CULTURE/COLONY COUNT: CPT

## 2024-11-09 PROCEDURE — 6360000002 HC RX W HCPCS: Performed by: INTERNAL MEDICINE

## 2024-11-09 PROCEDURE — 99285 EMERGENCY DEPT VISIT HI MDM: CPT

## 2024-11-09 PROCEDURE — 87040 BLOOD CULTURE FOR BACTERIA: CPT

## 2024-11-09 PROCEDURE — 83605 ASSAY OF LACTIC ACID: CPT

## 2024-11-09 PROCEDURE — 93005 ELECTROCARDIOGRAM TRACING: CPT | Performed by: NURSE PRACTITIONER

## 2024-11-09 PROCEDURE — 2580000003 HC RX 258: Performed by: NURSE PRACTITIONER

## 2024-11-09 PROCEDURE — 2060000000 HC ICU INTERMEDIATE R&B

## 2024-11-09 PROCEDURE — 74176 CT ABD & PELVIS W/O CONTRAST: CPT

## 2024-11-09 PROCEDURE — 2500000003 HC RX 250 WO HCPCS: Performed by: INTERNAL MEDICINE

## 2024-11-09 PROCEDURE — 87186 SC STD MICRODIL/AGAR DIL: CPT

## 2024-11-09 PROCEDURE — 80053 COMPREHEN METABOLIC PANEL: CPT

## 2024-11-09 PROCEDURE — 87636 SARSCOV2 & INF A&B AMP PRB: CPT

## 2024-11-09 PROCEDURE — 87641 MR-STAPH DNA AMP PROBE: CPT

## 2024-11-09 PROCEDURE — 1200000000 HC SEMI PRIVATE

## 2024-11-09 PROCEDURE — 84145 PROCALCITONIN (PCT): CPT

## 2024-11-09 PROCEDURE — 81001 URINALYSIS AUTO W/SCOPE: CPT

## 2024-11-09 PROCEDURE — 87150 DNA/RNA AMPLIFIED PROBE: CPT

## 2024-11-09 PROCEDURE — 6370000000 HC RX 637 (ALT 250 FOR IP): Performed by: NURSE PRACTITIONER

## 2024-11-09 PROCEDURE — 6370000000 HC RX 637 (ALT 250 FOR IP): Performed by: INTERNAL MEDICINE

## 2024-11-09 PROCEDURE — 84484 ASSAY OF TROPONIN QUANT: CPT

## 2024-11-09 PROCEDURE — 96374 THER/PROPH/DIAG INJ IV PUSH: CPT

## 2024-11-09 PROCEDURE — 87077 CULTURE AEROBIC IDENTIFY: CPT

## 2024-11-09 RX ORDER — SODIUM CHLORIDE 0.9 % (FLUSH) 0.9 %
10 SYRINGE (ML) INJECTION EVERY 12 HOURS SCHEDULED
Status: DISCONTINUED | OUTPATIENT
Start: 2024-11-09 | End: 2024-11-11 | Stop reason: HOSPADM

## 2024-11-09 RX ORDER — TAMSULOSIN HYDROCHLORIDE 0.4 MG/1
0.4 CAPSULE ORAL DAILY
Status: DISCONTINUED | OUTPATIENT
Start: 2024-11-09 | End: 2024-11-11 | Stop reason: HOSPADM

## 2024-11-09 RX ORDER — LANOLIN ALCOHOL/MO/W.PET/CERES
3 CREAM (GRAM) TOPICAL NIGHTLY PRN
Status: DISCONTINUED | OUTPATIENT
Start: 2024-11-09 | End: 2024-11-11 | Stop reason: HOSPADM

## 2024-11-09 RX ORDER — HEPARIN SODIUM 5000 [USP'U]/ML
5000 INJECTION, SOLUTION INTRAVENOUS; SUBCUTANEOUS 2 TIMES DAILY
Status: DISCONTINUED | OUTPATIENT
Start: 2024-11-10 | End: 2024-11-11 | Stop reason: HOSPADM

## 2024-11-09 RX ORDER — NIFEDIPINE 30 MG
90 TABLET, EXTENDED RELEASE ORAL DAILY
Status: DISCONTINUED | OUTPATIENT
Start: 2024-11-09 | End: 2024-11-11 | Stop reason: HOSPADM

## 2024-11-09 RX ORDER — SODIUM BICARBONATE 650 MG/1
325 TABLET ORAL 3 TIMES DAILY
Status: DISCONTINUED | OUTPATIENT
Start: 2024-11-09 | End: 2024-11-11

## 2024-11-09 RX ORDER — ONDANSETRON 2 MG/ML
4 INJECTION INTRAMUSCULAR; INTRAVENOUS EVERY 6 HOURS PRN
Status: DISCONTINUED | OUTPATIENT
Start: 2024-11-09 | End: 2024-11-11 | Stop reason: HOSPADM

## 2024-11-09 RX ORDER — ACETAMINOPHEN 500 MG
1000 TABLET ORAL ONCE
Status: COMPLETED | OUTPATIENT
Start: 2024-11-09 | End: 2024-11-09

## 2024-11-09 RX ORDER — CARVEDILOL 6.25 MG/1
6.25 TABLET ORAL 2 TIMES DAILY WITH MEALS
Status: DISCONTINUED | OUTPATIENT
Start: 2024-11-09 | End: 2024-11-11 | Stop reason: HOSPADM

## 2024-11-09 RX ORDER — ACETAMINOPHEN 325 MG/1
650 TABLET ORAL EVERY 6 HOURS PRN
Status: DISCONTINUED | OUTPATIENT
Start: 2024-11-09 | End: 2024-11-11 | Stop reason: HOSPADM

## 2024-11-09 RX ORDER — ASPIRIN 325 MG
325 TABLET, DELAYED RELEASE (ENTERIC COATED) ORAL DAILY
Status: DISCONTINUED | OUTPATIENT
Start: 2024-11-09 | End: 2024-11-09

## 2024-11-09 RX ORDER — NICOTINE 21 MG/24HR
1 PATCH, TRANSDERMAL 24 HOURS TRANSDERMAL DAILY PRN
Status: DISCONTINUED | OUTPATIENT
Start: 2024-11-09 | End: 2024-11-09

## 2024-11-09 RX ORDER — SODIUM CHLORIDE 0.9 % (FLUSH) 0.9 %
10 SYRINGE (ML) INJECTION PRN
Status: DISCONTINUED | OUTPATIENT
Start: 2024-11-09 | End: 2024-11-11 | Stop reason: HOSPADM

## 2024-11-09 RX ORDER — 0.9 % SODIUM CHLORIDE 0.9 %
1000 INTRAVENOUS SOLUTION INTRAVENOUS ONCE
Status: COMPLETED | OUTPATIENT
Start: 2024-11-09 | End: 2024-11-09

## 2024-11-09 RX ORDER — SODIUM CHLORIDE 9 MG/ML
INJECTION, SOLUTION INTRAVENOUS PRN
Status: DISCONTINUED | OUTPATIENT
Start: 2024-11-09 | End: 2024-11-11 | Stop reason: HOSPADM

## 2024-11-09 RX ORDER — ACETAMINOPHEN 650 MG/1
650 SUPPOSITORY RECTAL EVERY 6 HOURS PRN
Status: DISCONTINUED | OUTPATIENT
Start: 2024-11-09 | End: 2024-11-11 | Stop reason: HOSPADM

## 2024-11-09 RX ORDER — VANCOMYCIN HYDROCHLORIDE 1 G/200ML
1000 INJECTION, SOLUTION INTRAVENOUS ONCE
Status: COMPLETED | OUTPATIENT
Start: 2024-11-09 | End: 2024-11-09

## 2024-11-09 RX ORDER — FINASTERIDE 5 MG/1
5 TABLET, FILM COATED ORAL DAILY
Status: DISCONTINUED | OUTPATIENT
Start: 2024-11-09 | End: 2024-11-11 | Stop reason: HOSPADM

## 2024-11-09 RX ORDER — PROMETHAZINE HYDROCHLORIDE 25 MG/1
12.5 TABLET ORAL EVERY 6 HOURS PRN
Status: DISCONTINUED | OUTPATIENT
Start: 2024-11-09 | End: 2024-11-11 | Stop reason: HOSPADM

## 2024-11-09 RX ADMIN — SODIUM BICARBONATE: 84 INJECTION, SOLUTION INTRAVENOUS at 16:11

## 2024-11-09 RX ADMIN — ACETAMINOPHEN 1000 MG: 500 TABLET ORAL at 03:49

## 2024-11-09 RX ADMIN — NIFEDIPINE 90 MG: 30 TABLET, EXTENDED RELEASE ORAL at 10:34

## 2024-11-09 RX ADMIN — SODIUM BICARBONATE 325 MG: 650 TABLET ORAL at 10:31

## 2024-11-09 RX ADMIN — SODIUM CHLORIDE, PRESERVATIVE FREE 10 ML: 5 INJECTION INTRAVENOUS at 20:04

## 2024-11-09 RX ADMIN — SODIUM CHLORIDE 1000 ML: 9 INJECTION, SOLUTION INTRAVENOUS at 03:58

## 2024-11-09 RX ADMIN — SODIUM BICARBONATE 325 MG: 650 TABLET ORAL at 12:53

## 2024-11-09 RX ADMIN — FINASTERIDE 5 MG: 5 TABLET, FILM COATED ORAL at 10:31

## 2024-11-09 RX ADMIN — WATER 1000 MG: 1 INJECTION INTRAMUSCULAR; INTRAVENOUS; SUBCUTANEOUS at 10:31

## 2024-11-09 RX ADMIN — CEFEPIME 2000 MG: 2 INJECTION, POWDER, FOR SOLUTION INTRAVENOUS at 03:58

## 2024-11-09 RX ADMIN — TAMSULOSIN HYDROCHLORIDE 0.4 MG: 0.4 CAPSULE ORAL at 10:31

## 2024-11-09 RX ADMIN — VANCOMYCIN HYDROCHLORIDE 1000 MG: 1 INJECTION, SOLUTION INTRAVENOUS at 04:40

## 2024-11-09 RX ADMIN — PROMETHAZINE HYDROCHLORIDE 12.5 MG: 25 TABLET ORAL at 12:53

## 2024-11-09 RX ADMIN — CARVEDILOL 6.25 MG: 6.25 TABLET, FILM COATED ORAL at 10:31

## 2024-11-09 RX ADMIN — CARVEDILOL 6.25 MG: 6.25 TABLET, FILM COATED ORAL at 16:12

## 2024-11-09 RX ADMIN — SODIUM CHLORIDE, PRESERVATIVE FREE 10 ML: 5 INJECTION INTRAVENOUS at 10:31

## 2024-11-09 ASSESSMENT — PAIN SCALES - GENERAL: PAINLEVEL_OUTOF10: 10

## 2024-11-09 ASSESSMENT — PAIN - FUNCTIONAL ASSESSMENT: PAIN_FUNCTIONAL_ASSESSMENT: 0-10

## 2024-11-09 ASSESSMENT — LIFESTYLE VARIABLES
HOW MANY STANDARD DRINKS CONTAINING ALCOHOL DO YOU HAVE ON A TYPICAL DAY: PATIENT DOES NOT DRINK
HOW OFTEN DO YOU HAVE A DRINK CONTAINING ALCOHOL: NEVER

## 2024-11-09 NOTE — ED PROVIDER NOTES
and pending at this time    Reassessment Exam:   Not applicable. Patient does not have septic shock.    CONSULTS: (Who and What was discussed)  None  Discussion with Other Profesionals : Admitting Team dr. webb    Social Determinants : Patient lacks transportation    Records Reviewed : Outpatient Notes arebi, wound care 10/23/2024    CC/HPI Summary, DDx, ED Course, and Reassessment:     Briefly, this is a 74 year old male who presents to the emergency department from skilled nursing facility with concern of hematuria/urinary retention and tremulousness.  Patient states that Guerra catheter was changed 2 days ago and he has had bleeding since, he is not certain why the catheter was changed.  Discomfort this evening became severe, pain since Friday patient reports.    He is awake and alert.  16 Fijian Guerra catheter present at time of arrival, karen hematuria with limited output in catheter bag.    Guerra catheter balloon deflated and catheter pushed forward, re-inflated balloon without resistance.  Irrigated catheter with sterile water, catheter is flowing to gravity following irrigation.  Bladder scan did reveal >999 mL prior to irrigation    Severe sepsis with hydroureternephrosis and cystitis. No ureter stone.    he was given fluids and abx, much improved    I asked nursing to not change the catheter as he had likely a traumatic insertion 2 days ago and now hematuria, catheter is flowing to gravity and can be hand irrigated easily by nursing until urology can see the patient today and swap catheter if they desire.    requesting admission for severe sepsis, urinary retention, hematuria, cystitis, and hydroureternephrosis.     Disposition Considerations (include 1 Tests not done, Shared Decision Making, Pt Expectation of Test or Tx.): Shared decision making: Initial differential diagnoses were discussed with this patient, along with physical exam findings and an explanation what evaluation studies were necessary and 
self-care

## 2024-11-09 NOTE — H&P
rate and rhythm with Normal S1/S2 without murmurs.  Abdomen: Soft, non-tender, non-distended. Positive BS all four quadrants.  Guerra is in place  Extremities: No clubbing, cyanosis, or edema bilaterally.   Neurologic: CN 2-12 grossly intact. No speech or motor deficits  Psychiatry: Appropriate affect. Not agitated  Skin: Warm, dry, normal turgor, no rash  Brisk capillary refill, peripheral pulses palpable     Labs:  CBC:   Lab Results   Component Value Date/Time    WBC 19.3 11/09/2024 07:55 AM    RBC 4.30 11/09/2024 07:55 AM    HGB 12.1 11/09/2024 07:55 AM    HCT 38.0 11/09/2024 07:55 AM    MCV 88.3 11/09/2024 07:55 AM    MCH 28.2 11/09/2024 07:55 AM    MCHC 31.9 11/09/2024 07:55 AM    RDW 17.4 11/09/2024 07:55 AM     11/09/2024 07:55 AM    MPV 7.3 11/09/2024 07:55 AM     BMP:    Lab Results   Component Value Date/Time     11/09/2024 07:55 AM    K 4.0 11/09/2024 07:55 AM    K 4.5 11/09/2024 03:47 AM     11/09/2024 07:55 AM    CO2 17 11/09/2024 07:55 AM    BUN 38 11/09/2024 07:55 AM    CREATININE 2.6 11/09/2024 07:55 AM    CALCIUM 8.7 11/09/2024 07:55 AM    LABGLOM 25 11/09/2024 07:55 AM    GLUCOSE 130 11/09/2024 07:55 AM     XR CHEST PORTABLE   Final Result   There is no evidence of acute chest disease.         CT ABDOMEN PELVIS WO CONTRAST Additional Contrast? None   Final Result   1. Marked thickening of the urinary bladder walls with adjacent fat stranding   suggesting cystitis.   2. Moderate bilateral hydronephrosis and hydroureter more prominent on the   left side.   3. Colonic diverticulosis without evidence of acute diverticulitis.   4. Stable low-attenuation foci in the liver consistent with benign entities   such as hepatic cysts or hemangiomas.             Discussed case  with ED physician    Problem List  Principal Problem:    UTI (urinary tract infection)  Resolved Problems:    * No resolved hospital problems. *        Assessment/Plan:     Moderate bilateral hydroureteral

## 2024-11-09 NOTE — ACP (ADVANCE CARE PLANNING)
Advance Care Planning Note       Purpose of Encounter: Advanced care planning in light of hospitalization for UTI/sepsis/hydroureteronephrosis  Parties in attendance: :Tye Resendez, TRACY MERCER MD  Decisional Capacity:Yes  Objective: This 74 y.o. male  with PMHx of hypertension, hyperlipidemia bipolar disorder, BPH with chronic urinary retention s/p Guerra's catheter and CKD stage stage IV who presented with hematuria and urinary retention.   Goals of Care Determinations: Pt wants full support measures including CPR, intubation, trach, PEG tube, dialysis   Code Status: Full  Time Spent on Advanced Planning Documents: 18 mins.  Advanced Care Planning Documents:  Completed advances directives, advanced directives in chart.      Electronically signed by TRACY MERCER MD on 11/9/2024 at 12:28 PM

## 2024-11-10 LAB
ALBUMIN SERPL-MCNC: 3.1 G/DL (ref 3.4–5)
ANION GAP SERPL CALCULATED.3IONS-SCNC: 10 MMOL/L (ref 3–16)
BASOPHILS # BLD: 0 K/UL (ref 0–0.2)
BASOPHILS NFR BLD: 0.3 %
BUN SERPL-MCNC: 34 MG/DL (ref 7–20)
CALCIUM SERPL-MCNC: 8.7 MG/DL (ref 8.3–10.6)
CHLORIDE SERPL-SCNC: 103 MMOL/L (ref 99–110)
CO2 SERPL-SCNC: 23 MMOL/L (ref 21–32)
CREAT SERPL-MCNC: 2.4 MG/DL (ref 0.8–1.3)
DEPRECATED RDW RBC AUTO: 17.2 % (ref 12.4–15.4)
EOSINOPHIL # BLD: 0 K/UL (ref 0–0.6)
EOSINOPHIL NFR BLD: 0.2 %
GFR SERPLBLD CREATININE-BSD FMLA CKD-EPI: 27 ML/MIN/{1.73_M2}
GLUCOSE SERPL-MCNC: 124 MG/DL (ref 70–99)
HCT VFR BLD AUTO: 34.3 % (ref 40.5–52.5)
HGB BLD-MCNC: 11.2 G/DL (ref 13.5–17.5)
LYMPHOCYTES # BLD: 0.9 K/UL (ref 1–5.1)
LYMPHOCYTES NFR BLD: 10.4 %
MCH RBC QN AUTO: 28.8 PG (ref 26–34)
MCHC RBC AUTO-ENTMCNC: 32.8 G/DL (ref 31–36)
MCV RBC AUTO: 87.9 FL (ref 80–100)
MONOCYTES # BLD: 0.7 K/UL (ref 0–1.3)
MONOCYTES NFR BLD: 8.5 %
NEUTROPHILS # BLD: 6.7 K/UL (ref 1.7–7.7)
NEUTROPHILS NFR BLD: 80.6 %
PHOSPHATE SERPL-MCNC: 3 MG/DL (ref 2.5–4.9)
PLATELET # BLD AUTO: 177 K/UL (ref 135–450)
PMV BLD AUTO: 7.3 FL (ref 5–10.5)
POTASSIUM SERPL-SCNC: 3.3 MMOL/L (ref 3.5–5.1)
RBC # BLD AUTO: 3.9 M/UL (ref 4.2–5.9)
SODIUM SERPL-SCNC: 136 MMOL/L (ref 136–145)
WBC # BLD AUTO: 8.3 K/UL (ref 4–11)

## 2024-11-10 PROCEDURE — 36415 COLL VENOUS BLD VENIPUNCTURE: CPT

## 2024-11-10 PROCEDURE — 85025 COMPLETE CBC W/AUTO DIFF WBC: CPT

## 2024-11-10 PROCEDURE — 6370000000 HC RX 637 (ALT 250 FOR IP): Performed by: INTERNAL MEDICINE

## 2024-11-10 PROCEDURE — 6360000002 HC RX W HCPCS: Performed by: INTERNAL MEDICINE

## 2024-11-10 PROCEDURE — 2060000000 HC ICU INTERMEDIATE R&B

## 2024-11-10 PROCEDURE — 80069 RENAL FUNCTION PANEL: CPT

## 2024-11-10 PROCEDURE — 1200000000 HC SEMI PRIVATE

## 2024-11-10 PROCEDURE — 94761 N-INVAS EAR/PLS OXIMETRY MLT: CPT

## 2024-11-10 PROCEDURE — 2580000003 HC RX 258: Performed by: INTERNAL MEDICINE

## 2024-11-10 RX ORDER — POTASSIUM CHLORIDE 1500 MG/1
40 TABLET, EXTENDED RELEASE ORAL ONCE
Status: COMPLETED | OUTPATIENT
Start: 2024-11-10 | End: 2024-11-10

## 2024-11-10 RX ADMIN — SODIUM CHLORIDE, PRESERVATIVE FREE 10 ML: 5 INJECTION INTRAVENOUS at 09:54

## 2024-11-10 RX ADMIN — POTASSIUM CHLORIDE 40 MEQ: 1500 TABLET, EXTENDED RELEASE ORAL at 12:27

## 2024-11-10 RX ADMIN — FINASTERIDE 5 MG: 5 TABLET, FILM COATED ORAL at 09:54

## 2024-11-10 RX ADMIN — TAMSULOSIN HYDROCHLORIDE 0.4 MG: 0.4 CAPSULE ORAL at 09:54

## 2024-11-10 RX ADMIN — HEPARIN SODIUM 5000 UNITS: 5000 INJECTION INTRAVENOUS; SUBCUTANEOUS at 09:54

## 2024-11-10 RX ADMIN — SODIUM CHLORIDE, PRESERVATIVE FREE 10 ML: 5 INJECTION INTRAVENOUS at 20:34

## 2024-11-10 RX ADMIN — HEPARIN SODIUM 5000 UNITS: 5000 INJECTION INTRAVENOUS; SUBCUTANEOUS at 20:34

## 2024-11-10 RX ADMIN — CARVEDILOL 6.25 MG: 6.25 TABLET, FILM COATED ORAL at 17:50

## 2024-11-10 RX ADMIN — WATER 1000 MG: 1 INJECTION INTRAMUSCULAR; INTRAVENOUS; SUBCUTANEOUS at 09:54

## 2024-11-10 ASSESSMENT — PAIN SCALES - GENERAL
PAINLEVEL_OUTOF10: 0
PAINLEVEL_OUTOF10: 0

## 2024-11-10 NOTE — PLAN OF CARE
Problem: Discharge Planning  Goal: Discharge to home or other facility with appropriate resources  11/10/2024 1106 by Brinda Cruz RN  Outcome: Progressing     Problem: Safety - Adult  Goal: Free from fall injury  11/10/2024 1106 by Brinda Cruz RN  Outcome: Progressing     Problem: Anxiety  Goal: Will report anxiety at manageable levels  Description: INTERVENTIONS:  1. Administer medication as ordered  2. Teach and rehearse alternative coping skills  3. Provide emotional support with 1:1 interaction with staff  Outcome: Progressing     Problem: Confusion  Goal: Confusion, delirium, dementia, or psychosis is improved or at baseline  Description: INTERVENTIONS:  1. Assess for possible contributors to thought disturbance, including medications, impaired vision or hearing, underlying metabolic abnormalities, dehydration, psychiatric diagnoses, and notify attending LIP  2. [place] high risk fall precautions, as indicated  3. Provide frequent short contacts to provide reality reorientation, refocusing and direction  4. Decrease environmental stimuli, including noise as appropriate  5. Monitor and intervene to maintain adequate nutrition, hydration, elimination, sleep and activity  6. If unable to ensure safety without constant attention obtain sitter and review sitter guidelines with assigned personnel  7. Initiate Psychosocial CNS and Spiritual Care consult, as indicated  Outcome: Progressing     Problem: Neurosensory - Adult  Goal: Achieves maximal functionality and self care  Outcome: Progressing     Problem: Cardiovascular - Adult  Goal: Maintains optimal cardiac output and hemodynamic stability  Outcome: Progressing     Problem: Skin/Tissue Integrity - Adult  Goal: Skin integrity remains intact  Outcome: Progressing     Problem: Genitourinary - Adult  Goal: Urinary catheter remains patent  Outcome: Progressing     Problem: Metabolic/Fluid and Electrolytes - Adult  Goal: Electrolytes maintained within

## 2024-11-11 VITALS
DIASTOLIC BLOOD PRESSURE: 72 MMHG | TEMPERATURE: 97.9 F | OXYGEN SATURATION: 98 % | RESPIRATION RATE: 14 BRPM | HEART RATE: 70 BPM | WEIGHT: 218.7 LBS | HEIGHT: 68 IN | BODY MASS INDEX: 33.15 KG/M2 | SYSTOLIC BLOOD PRESSURE: 111 MMHG

## 2024-11-11 LAB
ALBUMIN SERPL-MCNC: 3.3 G/DL (ref 3.4–5)
ANION GAP SERPL CALCULATED.3IONS-SCNC: 12 MMOL/L (ref 3–16)
BACTERIA UR CULT: ABNORMAL
BASOPHILS # BLD: 0.1 K/UL (ref 0–0.2)
BASOPHILS NFR BLD: 1 %
BUN SERPL-MCNC: 30 MG/DL (ref 7–20)
CALCIUM SERPL-MCNC: 9 MG/DL (ref 8.3–10.6)
CHLORIDE SERPL-SCNC: 105 MMOL/L (ref 99–110)
CO2 SERPL-SCNC: 21 MMOL/L (ref 21–32)
CREAT SERPL-MCNC: 2.3 MG/DL (ref 0.8–1.3)
DEPRECATED RDW RBC AUTO: 16.7 % (ref 12.4–15.4)
EOSINOPHIL # BLD: 0.2 K/UL (ref 0–0.6)
EOSINOPHIL NFR BLD: 3.6 %
GFR SERPLBLD CREATININE-BSD FMLA CKD-EPI: 29 ML/MIN/{1.73_M2}
GLUCOSE SERPL-MCNC: 102 MG/DL (ref 70–99)
HCT VFR BLD AUTO: 37.2 % (ref 40.5–52.5)
HGB BLD-MCNC: 12.2 G/DL (ref 13.5–17.5)
LYMPHOCYTES # BLD: 1.5 K/UL (ref 1–5.1)
LYMPHOCYTES NFR BLD: 26 %
MCH RBC QN AUTO: 28.7 PG (ref 26–34)
MCHC RBC AUTO-ENTMCNC: 32.8 G/DL (ref 31–36)
MCV RBC AUTO: 87.7 FL (ref 80–100)
MONOCYTES # BLD: 0.8 K/UL (ref 0–1.3)
MONOCYTES NFR BLD: 14.6 %
MRSA DNA SPEC QL NAA+PROBE: NORMAL
NEUTROPHILS # BLD: 3.2 K/UL (ref 1.7–7.7)
NEUTROPHILS NFR BLD: 54.8 %
ORGANISM: ABNORMAL
PHOSPHATE SERPL-MCNC: 2.9 MG/DL (ref 2.5–4.9)
PLATELET # BLD AUTO: 196 K/UL (ref 135–450)
PMV BLD AUTO: 7.6 FL (ref 5–10.5)
POTASSIUM SERPL-SCNC: 3.6 MMOL/L (ref 3.5–5.1)
RBC # BLD AUTO: 4.24 M/UL (ref 4.2–5.9)
SODIUM SERPL-SCNC: 138 MMOL/L (ref 136–145)
WBC # BLD AUTO: 5.8 K/UL (ref 4–11)

## 2024-11-11 PROCEDURE — 85025 COMPLETE CBC W/AUTO DIFF WBC: CPT

## 2024-11-11 PROCEDURE — 6370000000 HC RX 637 (ALT 250 FOR IP): Performed by: INTERNAL MEDICINE

## 2024-11-11 PROCEDURE — 97165 OT EVAL LOW COMPLEX 30 MIN: CPT

## 2024-11-11 PROCEDURE — 97535 SELF CARE MNGMENT TRAINING: CPT

## 2024-11-11 PROCEDURE — 6360000002 HC RX W HCPCS: Performed by: INTERNAL MEDICINE

## 2024-11-11 PROCEDURE — 2580000003 HC RX 258: Performed by: INTERNAL MEDICINE

## 2024-11-11 PROCEDURE — 97161 PT EVAL LOW COMPLEX 20 MIN: CPT

## 2024-11-11 PROCEDURE — 36415 COLL VENOUS BLD VENIPUNCTURE: CPT

## 2024-11-11 PROCEDURE — 97116 GAIT TRAINING THERAPY: CPT

## 2024-11-11 PROCEDURE — 80069 RENAL FUNCTION PANEL: CPT

## 2024-11-11 RX ORDER — CIPROFLOXACIN 500 MG/1
500 TABLET, FILM COATED ORAL 2 TIMES DAILY
Qty: 8 TABLET | Refills: 0 | Status: ON HOLD | OUTPATIENT
Start: 2024-11-11 | End: 2024-11-15

## 2024-11-11 RX ADMIN — FINASTERIDE 5 MG: 5 TABLET, FILM COATED ORAL at 09:52

## 2024-11-11 RX ADMIN — NIFEDIPINE 90 MG: 30 TABLET, EXTENDED RELEASE ORAL at 10:00

## 2024-11-11 RX ADMIN — HEPARIN SODIUM 5000 UNITS: 5000 INJECTION INTRAVENOUS; SUBCUTANEOUS at 09:52

## 2024-11-11 RX ADMIN — CARVEDILOL 6.25 MG: 6.25 TABLET, FILM COATED ORAL at 09:52

## 2024-11-11 RX ADMIN — SODIUM CHLORIDE, PRESERVATIVE FREE 10 ML: 5 INJECTION INTRAVENOUS at 09:51

## 2024-11-11 RX ADMIN — PIPERACILLIN SODIUM AND TAZOBACTAM SODIUM 4500 MG: 4; .5 INJECTION, SOLUTION INTRAVENOUS at 10:02

## 2024-11-11 RX ADMIN — TAMSULOSIN HYDROCHLORIDE 0.4 MG: 0.4 CAPSULE ORAL at 09:52

## 2024-11-11 ASSESSMENT — PAIN SCALES - GENERAL
PAINLEVEL_OUTOF10: 0
PAINLEVEL_OUTOF10: 0

## 2024-11-11 NOTE — CARE COORDINATION
disease, unspecified CKD stage [N18.9]    IF APPLICABLE: The Patient and/or patient representative Tye and his family were provided with a choice of provider and agrees with the discharge plan. Freedom of choice list with basic dialogue that supports the patient's individualized plan of care/goals and shares the quality data associated with the providers was provided to:     Patient Representative Name:       The Patient and/or Patient Representative Agree with the Discharge Plan?          Electronically signed by SHAWANDA Mc on 11/11/2024 at 1:36 PM

## 2024-11-11 NOTE — PLAN OF CARE
Problem: Discharge Planning  Goal: Discharge to home or other facility with appropriate resources  Outcome: Progressing     Problem: Safety - Adult  Goal: Free from fall injury  Outcome: Progressing     Problem: Anxiety  Goal: Will report anxiety at manageable levels  Description: INTERVENTIONS:  1. Administer medication as ordered  2. Teach and rehearse alternative coping skills  3. Provide emotional support with 1:1 interaction with staff  Outcome: Progressing     Problem: Confusion  Goal: Confusion, delirium, dementia, or psychosis is improved or at baseline  Description: INTERVENTIONS:  1. Assess for possible contributors to thought disturbance, including medications, impaired vision or hearing, underlying metabolic abnormalities, dehydration, psychiatric diagnoses, and notify attending LIP  2. [place] high risk fall precautions, as indicated  3. Provide frequent short contacts to provide reality reorientation, refocusing and direction  4. Decrease environmental stimuli, including noise as appropriate  5. Monitor and intervene to maintain adequate nutrition, hydration, elimination, sleep and activity  6. If unable to ensure safety without constant attention obtain sitter and review sitter guidelines with assigned personnel  7. Initiate Psychosocial CNS and Spiritual Care consult, as indicated  Outcome: Progressing     Problem: Neurosensory - Adult  Goal: Achieves maximal functionality and self care  Outcome: Progressing     Problem: Cardiovascular - Adult  Goal: Maintains optimal cardiac output and hemodynamic stability  Outcome: Progressing     Problem: Skin/Tissue Integrity - Adult  Goal: Skin integrity remains intact  Outcome: Progressing     Problem: Genitourinary - Adult  Goal: Urinary catheter remains patent  Outcome: Progressing     Problem: Metabolic/Fluid and Electrolytes - Adult  Goal: Electrolytes maintained within normal limits  Outcome: Progressing

## 2024-11-11 NOTE — DISCHARGE INSTRUCTIONS
Follow up with your PCP within 7 days of discharge.  Follow up with urology as instructed   Follow up with nephrology as instructed   Take all your medications as prescribed.

## 2024-11-11 NOTE — DISCHARGE INSTR - COC
Continuity of Care Form    Patient Name: Tye Resendez   :  1950  MRN:  [id number]    Admit date:  2024  Discharge date:  ***    Code Status Order: Full Code   Advance Directives:   Advance Care Flowsheet Documentation             Admitting Physician:  Alonzo Lim DO  PCP: Chris Persaud MD    Discharging Nurse: ***  Discharging Hospital Unit/Room#: 3TN-3367/3367-01  Discharging Unit Phone Number: [phone]    Emergency Contact:   Extended Emergency Contact Information  Primary Emergency Contact: Arabella Resendez  Home Phone: [phone]  Relation: Spouse    Past Surgical History:  Past Surgical History:   Procedure Laterality Date    LEG SURGERY Left 10/6/2024    LEFT LEG DEBRIDEMENT INCISION AND DRAINAGE performed by Edd Lorenzo MD at [facility] OR       Immunization History:   Immunization History   Administered Date(s) Administered    COVID-19, PFIZER, (age 12y+), IM, 30mcg/0.3mL 2023       Active Problems:  Patient Active Problem List   Diagnosis Code    DIMA (acute kidney injury) ([facility]) N17.9    Hematuria R31.9    Hyponatremia E87.1    Sepsis ([facility]) A41.9    Soft tissue mass M79.89    Abscess of left leg L02.416    Leg mass, left R22.42    UTI (urinary tract infection) N39.0       Isolation/Infection:   Isolation            No Isolation          Patient Infection Status       None to display                     Nurse Assessment:  Last Vital Signs: BP (!) 169/87   Pulse 74   Temp 97.9 °F (36.6 °C) (Oral)   Resp 12   Ht 1.727 m (5' 8\")   Wt 99.2 kg (218 lb 11.2 oz)   SpO2 97%   BMI 33.25 kg/m²     Last documented pain score (0-10 scale): Pain Level: 0  Last Weight:   Wt Readings from Last 1 Encounters:   11/10/24 99.2 kg (218 lb 11.2 oz)     Mental Status:  oriented and alert    IV Access:  - None    Nursing Mobility/ADLs:  Walking   Assisted  Transfer  Assisted  Bathing  Assisted  Dressing  Assisted  Toileting  Assisted  Feeding  Independent  Med Admin  Assisted  Med Delivery

## 2024-11-12 LAB — REPORT: NORMAL

## 2024-11-13 ENCOUNTER — HOSPITAL ENCOUNTER (OUTPATIENT)
Age: 74
Setting detail: OBSERVATION
Discharge: HOME | End: 2024-11-15
Attending: INTERNAL MEDICINE | Admitting: INTERNAL MEDICINE
Payer: OTHER GOVERNMENT

## 2024-11-13 ENCOUNTER — HOSPITAL ENCOUNTER (OUTPATIENT)
Age: 74
Discharge: HOME | End: 2024-11-13
Payer: MEDICARE

## 2024-11-13 DIAGNOSIS — I12.9 BENIGN HYPERTENSION WITH STAGE 3B CHRONIC KIDNEY DISEASE (HCC): ICD-10-CM

## 2024-11-13 DIAGNOSIS — N40.0 BENIGN PROSTATIC HYPERPLASIA WITHOUT LOWER URINARY TRACT SYMPTOMS: ICD-10-CM

## 2024-11-13 DIAGNOSIS — Z09 HOSPITAL DISCHARGE FOLLOW-UP: ICD-10-CM

## 2024-11-13 DIAGNOSIS — N18.32 BENIGN HYPERTENSION WITH STAGE 3B CHRONIC KIDNEY DISEASE (HCC): ICD-10-CM

## 2024-11-13 DIAGNOSIS — N18.32 CHRONIC KIDNEY DISEASE, STAGE 3B (HCC): ICD-10-CM

## 2024-11-13 DIAGNOSIS — N13.39 OTHER HYDRONEPHROSIS: ICD-10-CM

## 2024-11-13 PROBLEM — R78.81 BACTEREMIA: Status: ACTIVE | Noted: 2024-11-13

## 2024-11-13 LAB
25(OH)D3 SERPL-MCNC: 36 NG/ML
ALBUMIN SERPL-MCNC: 4 G/DL (ref 3.4–5)
ANION GAP SERPL CALCULATED.3IONS-SCNC: 12 MMOL/L (ref 3–16)
ANION GAP SERPL CALCULATED.3IONS-SCNC: 15 MMOL/L (ref 3–16)
BACTERIA BLD CULT ORG #2: NORMAL
BACTERIA URNS QL MICRO: ABNORMAL /HPF
BASOPHILS # BLD: 0.1 K/UL (ref 0–0.2)
BASOPHILS NFR BLD: 0.8 %
BILIRUB UR QL STRIP.AUTO: NEGATIVE
BUN SERPL-MCNC: 35 MG/DL (ref 7–20)
BUN SERPL-MCNC: 37 MG/DL (ref 7–20)
CALCIUM SERPL-MCNC: 9.5 MG/DL (ref 8.3–10.6)
CALCIUM SERPL-MCNC: 9.8 MG/DL (ref 8.3–10.6)
CHLORIDE SERPL-SCNC: 105 MMOL/L (ref 99–110)
CHLORIDE SERPL-SCNC: 106 MMOL/L (ref 99–110)
CLARITY UR: CLEAR
CO2 SERPL-SCNC: 19 MMOL/L (ref 21–32)
CO2 SERPL-SCNC: 22 MMOL/L (ref 21–32)
COLOR UR: YELLOW
CREAT SERPL-MCNC: 2.4 MG/DL (ref 0.8–1.3)
CREAT SERPL-MCNC: 2.5 MG/DL (ref 0.8–1.3)
CREAT UR-MCNC: 52.8 MG/DL (ref 39–259)
DEPRECATED RDW RBC AUTO: 16.8 % (ref 12.4–15.4)
DEPRECATED RDW RBC AUTO: 17.1 % (ref 12.4–15.4)
EOSINOPHIL # BLD: 0.4 K/UL (ref 0–0.6)
EOSINOPHIL NFR BLD: 6.4 %
EPI CELLS #/AREA URNS AUTO: 0 /HPF (ref 0–5)
FERRITIN SERPL IA-MCNC: 138 NG/ML (ref 30–400)
GFR SERPLBLD CREATININE-BSD FMLA CKD-EPI: 26 ML/MIN/{1.73_M2}
GFR SERPLBLD CREATININE-BSD FMLA CKD-EPI: 27 ML/MIN/{1.73_M2}
GLUCOSE SERPL-MCNC: 100 MG/DL (ref 70–99)
GLUCOSE SERPL-MCNC: 92 MG/DL (ref 70–99)
GLUCOSE UR STRIP.AUTO-MCNC: NEGATIVE MG/DL
HCT VFR BLD AUTO: 39.8 % (ref 40.5–52.5)
HCT VFR BLD AUTO: 40.4 % (ref 40.5–52.5)
HGB BLD-MCNC: 13.3 G/DL (ref 13.5–17.5)
HGB BLD-MCNC: 13.4 G/DL (ref 13.5–17.5)
HGB UR QL STRIP.AUTO: NEGATIVE
HYALINE CASTS #/AREA URNS AUTO: 1 /LPF (ref 0–8)
IRON SATN MFR SERPL: 22 % (ref 20–50)
IRON SERPL-MCNC: 60 UG/DL (ref 59–158)
KETONES UR STRIP.AUTO-MCNC: NEGATIVE MG/DL
LEUKOCYTE ESTERASE UR QL STRIP.AUTO: ABNORMAL
LYMPHOCYTES # BLD: 1.9 K/UL (ref 1–5.1)
LYMPHOCYTES NFR BLD: 26.7 %
MCH RBC QN AUTO: 29.2 PG (ref 26–34)
MCH RBC QN AUTO: 29.4 PG (ref 26–34)
MCHC RBC AUTO-ENTMCNC: 33.2 G/DL (ref 31–36)
MCHC RBC AUTO-ENTMCNC: 33.5 G/DL (ref 31–36)
MCV RBC AUTO: 87.8 FL (ref 80–100)
MCV RBC AUTO: 88 FL (ref 80–100)
MICROALBUMIN UR DL<=1MG/L-MCNC: 5.64 MG/DL
MICROALBUMIN/CREAT UR: 106.8 MG/G (ref 0–30)
MONOCYTES # BLD: 0.6 K/UL (ref 0–1.3)
MONOCYTES NFR BLD: 8.6 %
NEUTROPHILS # BLD: 4.1 K/UL (ref 1.7–7.7)
NEUTROPHILS NFR BLD: 57.5 %
NITRITE UR QL STRIP.AUTO: NEGATIVE
PH UR STRIP.AUTO: 7 [PH] (ref 5–8)
PHOSPHATE SERPL-MCNC: 2.9 MG/DL (ref 2.5–4.9)
PLATELET # BLD AUTO: 302 K/UL (ref 135–450)
PLATELET # BLD AUTO: 307 K/UL (ref 135–450)
PMV BLD AUTO: 7.2 FL (ref 5–10.5)
PMV BLD AUTO: 7.7 FL (ref 5–10.5)
POTASSIUM SERPL-SCNC: 3.9 MMOL/L (ref 3.5–5.1)
POTASSIUM SERPL-SCNC: 3.9 MMOL/L (ref 3.5–5.1)
PROT UR STRIP.AUTO-MCNC: 30 MG/DL
PROT UR-MCNC: 0.02 G/DL
PROT UR-MCNC: 22.8 MG/DL
PROT/CREAT UR-RTO: 0.4 MG/DL
PTH-INTACT SERPL-MCNC: 86.6 PG/ML (ref 14–72)
RBC # BLD AUTO: 4.53 M/UL (ref 4.2–5.9)
RBC # BLD AUTO: 4.59 M/UL (ref 4.2–5.9)
RBC CLUMPS #/AREA URNS AUTO: 0 /HPF (ref 0–4)
SODIUM SERPL-SCNC: 139 MMOL/L (ref 136–145)
SODIUM SERPL-SCNC: 140 MMOL/L (ref 136–145)
SP GR UR STRIP.AUTO: 1.01 (ref 1–1.03)
TIBC SERPL-MCNC: 269 UG/DL (ref 260–445)
UA COMPLETE W REFLEX CULTURE PNL UR: ABNORMAL
UA DIPSTICK W REFLEX MICRO PNL UR: YES
URN SPEC COLLECT METH UR: ABNORMAL
UROBILINOGEN UR STRIP-ACNC: 0.2 E.U./DL
WBC # BLD AUTO: 7 K/UL (ref 4–11)
WBC # BLD AUTO: 7.2 K/UL (ref 4–11)
WBC #/AREA URNS AUTO: 6 /HPF (ref 0–5)

## 2024-11-13 PROCEDURE — 6360000002 HC RX W HCPCS: Performed by: INTERNAL MEDICINE

## 2024-11-13 PROCEDURE — 96365 THER/PROPH/DIAG IV INF INIT: CPT

## 2024-11-13 PROCEDURE — 82306 VITAMIN D 25 HYDROXY: CPT

## 2024-11-13 PROCEDURE — 87040 BLOOD CULTURE FOR BACTERIA: CPT

## 2024-11-13 PROCEDURE — G0379 DIRECT REFER HOSPITAL OBSERV: HCPCS

## 2024-11-13 PROCEDURE — 85025 COMPLETE CBC W/AUTO DIFF WBC: CPT

## 2024-11-13 PROCEDURE — 83550 IRON BINDING TEST: CPT

## 2024-11-13 PROCEDURE — 81001 URINALYSIS AUTO W/SCOPE: CPT

## 2024-11-13 PROCEDURE — G0378 HOSPITAL OBSERVATION PER HR: HCPCS

## 2024-11-13 PROCEDURE — 83540 ASSAY OF IRON: CPT

## 2024-11-13 PROCEDURE — 84155 ASSAY OF PROTEIN SERUM: CPT

## 2024-11-13 PROCEDURE — 36415 COLL VENOUS BLD VENIPUNCTURE: CPT

## 2024-11-13 PROCEDURE — 82728 ASSAY OF FERRITIN: CPT

## 2024-11-13 PROCEDURE — 82570 ASSAY OF URINE CREATININE: CPT

## 2024-11-13 PROCEDURE — 80069 RENAL FUNCTION PANEL: CPT

## 2024-11-13 PROCEDURE — 84166 PROTEIN E-PHORESIS/URINE/CSF: CPT

## 2024-11-13 PROCEDURE — 85027 COMPLETE CBC AUTOMATED: CPT

## 2024-11-13 PROCEDURE — 84165 PROTEIN E-PHORESIS SERUM: CPT

## 2024-11-13 PROCEDURE — 2580000003 HC RX 258: Performed by: INTERNAL MEDICINE

## 2024-11-13 PROCEDURE — 82043 UR ALBUMIN QUANTITATIVE: CPT

## 2024-11-13 PROCEDURE — 83970 ASSAY OF PARATHORMONE: CPT

## 2024-11-13 PROCEDURE — 6370000000 HC RX 637 (ALT 250 FOR IP): Performed by: INTERNAL MEDICINE

## 2024-11-13 PROCEDURE — 84156 ASSAY OF PROTEIN URINE: CPT

## 2024-11-13 RX ORDER — OLANZAPINE 5 MG/1
10 TABLET ORAL NIGHTLY
Status: DISCONTINUED | OUTPATIENT
Start: 2024-11-13 | End: 2024-11-15 | Stop reason: HOSPADM

## 2024-11-13 RX ORDER — CARVEDILOL 6.25 MG/1
6.25 TABLET ORAL 2 TIMES DAILY WITH MEALS
Status: DISCONTINUED | OUTPATIENT
Start: 2024-11-13 | End: 2024-11-15 | Stop reason: HOSPADM

## 2024-11-13 RX ORDER — ASPIRIN 325 MG
325 TABLET, DELAYED RELEASE (ENTERIC COATED) ORAL DAILY
Status: DISCONTINUED | OUTPATIENT
Start: 2024-11-14 | End: 2024-11-15 | Stop reason: HOSPADM

## 2024-11-13 RX ORDER — FINASTERIDE 5 MG/1
5 TABLET, FILM COATED ORAL DAILY
Status: DISCONTINUED | OUTPATIENT
Start: 2024-11-14 | End: 2024-11-15 | Stop reason: HOSPADM

## 2024-11-13 RX ORDER — NIFEDIPINE 30 MG/1
90 TABLET, EXTENDED RELEASE ORAL DAILY
Status: DISCONTINUED | OUTPATIENT
Start: 2024-11-14 | End: 2024-11-15 | Stop reason: HOSPADM

## 2024-11-13 RX ORDER — POTASSIUM CHLORIDE 7.45 MG/ML
10 INJECTION INTRAVENOUS PRN
Status: DISCONTINUED | OUTPATIENT
Start: 2024-11-13 | End: 2024-11-15 | Stop reason: HOSPADM

## 2024-11-13 RX ORDER — VITAMIN B COMPLEX
1000 TABLET ORAL DAILY
Status: DISCONTINUED | OUTPATIENT
Start: 2024-11-14 | End: 2024-11-15 | Stop reason: HOSPADM

## 2024-11-13 RX ORDER — SODIUM CHLORIDE 0.9 % (FLUSH) 0.9 %
5-40 SYRINGE (ML) INJECTION PRN
Status: DISCONTINUED | OUTPATIENT
Start: 2024-11-13 | End: 2024-11-15 | Stop reason: HOSPADM

## 2024-11-13 RX ORDER — POTASSIUM CHLORIDE 1500 MG/1
40 TABLET, EXTENDED RELEASE ORAL PRN
Status: DISCONTINUED | OUTPATIENT
Start: 2024-11-13 | End: 2024-11-15 | Stop reason: HOSPADM

## 2024-11-13 RX ORDER — MAGNESIUM SULFATE IN WATER 40 MG/ML
2000 INJECTION, SOLUTION INTRAVENOUS PRN
Status: DISCONTINUED | OUTPATIENT
Start: 2024-11-13 | End: 2024-11-15 | Stop reason: HOSPADM

## 2024-11-13 RX ORDER — ONDANSETRON 4 MG/1
4 TABLET, ORALLY DISINTEGRATING ORAL EVERY 8 HOURS PRN
Status: DISCONTINUED | OUTPATIENT
Start: 2024-11-13 | End: 2024-11-15 | Stop reason: HOSPADM

## 2024-11-13 RX ORDER — POLYETHYLENE GLYCOL 3350 17 G/17G
17 POWDER, FOR SOLUTION ORAL DAILY PRN
Status: DISCONTINUED | OUTPATIENT
Start: 2024-11-13 | End: 2024-11-15 | Stop reason: HOSPADM

## 2024-11-13 RX ORDER — ACETAMINOPHEN 650 MG/1
650 SUPPOSITORY RECTAL EVERY 6 HOURS PRN
Status: DISCONTINUED | OUTPATIENT
Start: 2024-11-13 | End: 2024-11-15 | Stop reason: HOSPADM

## 2024-11-13 RX ORDER — SODIUM CHLORIDE 9 MG/ML
INJECTION, SOLUTION INTRAVENOUS PRN
Status: DISCONTINUED | OUTPATIENT
Start: 2024-11-13 | End: 2024-11-15 | Stop reason: HOSPADM

## 2024-11-13 RX ORDER — ACETAMINOPHEN 325 MG/1
650 TABLET ORAL EVERY 6 HOURS PRN
Status: DISCONTINUED | OUTPATIENT
Start: 2024-11-13 | End: 2024-11-15 | Stop reason: HOSPADM

## 2024-11-13 RX ORDER — TAMSULOSIN HYDROCHLORIDE 0.4 MG/1
0.4 CAPSULE ORAL DAILY
Status: DISCONTINUED | OUTPATIENT
Start: 2024-11-13 | End: 2024-11-15 | Stop reason: HOSPADM

## 2024-11-13 RX ORDER — ENOXAPARIN SODIUM 100 MG/ML
40 INJECTION SUBCUTANEOUS DAILY
Status: DISCONTINUED | OUTPATIENT
Start: 2024-11-14 | End: 2024-11-15 | Stop reason: HOSPADM

## 2024-11-13 RX ORDER — SODIUM CHLORIDE 0.9 % (FLUSH) 0.9 %
5-40 SYRINGE (ML) INJECTION EVERY 12 HOURS SCHEDULED
Status: DISCONTINUED | OUTPATIENT
Start: 2024-11-13 | End: 2024-11-15 | Stop reason: HOSPADM

## 2024-11-13 RX ORDER — ONDANSETRON 2 MG/ML
4 INJECTION INTRAMUSCULAR; INTRAVENOUS EVERY 6 HOURS PRN
Status: DISCONTINUED | OUTPATIENT
Start: 2024-11-13 | End: 2024-11-15 | Stop reason: HOSPADM

## 2024-11-13 RX ORDER — OLANZAPINE 5 MG/1
5 TABLET ORAL DAILY
Status: DISCONTINUED | OUTPATIENT
Start: 2024-11-14 | End: 2024-11-15 | Stop reason: HOSPADM

## 2024-11-13 RX ADMIN — TAMSULOSIN HYDROCHLORIDE 0.4 MG: 0.4 CAPSULE ORAL at 18:08

## 2024-11-13 RX ADMIN — SODIUM CHLORIDE, PRESERVATIVE FREE 10 ML: 5 INJECTION INTRAVENOUS at 20:40

## 2024-11-13 RX ADMIN — CEFEPIME 2000 MG: 2 INJECTION, POWDER, FOR SOLUTION INTRAVENOUS at 20:42

## 2024-11-13 RX ADMIN — OLANZAPINE 10 MG: 5 TABLET, FILM COATED ORAL at 20:39

## 2024-11-13 RX ADMIN — CARVEDILOL 6.25 MG: 6.25 TABLET, FILM COATED ORAL at 18:08

## 2024-11-13 ASSESSMENT — PAIN SCALES - GENERAL: PAINLEVEL_OUTOF10: 0

## 2024-11-13 NOTE — PROGRESS NOTES
IV attempted twice on patient. An order was placed online through [employer] to put in an PIV.

## 2024-11-13 NOTE — DISCHARGE SUMMARY
BMI 33.25 kg/m²     General appearance: No apparent distress, appears stated age and cooperative.  Cardiovascular: Regular rhythm, normal S1, S2. No murmur.   Respiratory: Clear to auscultation bilaterally, no wheeze or crackles.   GI: Abdomen soft, no tenderness, not distended, normal bowel sounds.  Guerra's catheter in place  Musculoskeletal:  No cyanosis in digits.  No BLE edema present  Neurology: CN 2-12 grossly intact. No speech or motor deficits  Skin: Warm, dry, normal turgor    Consults:     IP CONSULT TO UROLOGY  IP CONSULT TO NEPHROLOGY  IP CONSULT TO HOME CARE NEEDS    Disposition: Home    Condition at Discharge: Stable     Discharge Instructions/Follow-up:   Follow up with your PCP within 7 days of discharge.  Follow up with urology as instructed   Follow up with nephrology as instructed   Take all your medications as prescribed.      Discharge Medications:     Discharge Medication List as of 11/11/2024  2:08 PM             Details   ciprofloxacin (CIPRO) 500 MG tablet Take 1 tablet by mouth 2 times daily for 4 days, Disp-8 tablet, R-0Normal                Details   sodium bicarbonate 325 MG tablet Take 1 tablet by mouth 3 times daily, Disp-90 tablet, R-5Normal      aspirin 325 MG EC tablet Take 1 tablet by mouth in the morning and at bedtime, Disp-30 tablet, R-3Normal      finasteride (PROSCAR) 5 MG tablet Take 1 tablet by mouth daily, Disp-30 tablet, R-3Normal      tamsulosin (FLOMAX) 0.4 MG capsule Take 1 capsule by mouth daily, Disp-30 capsule, R-3Normal      carvedilol (COREG) 6.25 MG tablet Take 1 tablet by mouth 2 times daily (with meals)Historical Med      NIFEdipine (ADALAT CC) 90 MG extended release tablet Take 1 tablet by mouth daily Take one tablet by mouth once a day on an empty stomach.Historical Med      OLANZapine (ZYPREXA) 10 MG tablet Take 0.5-1 tablets by mouth See Admin Instructions Take 1/2 tablet by mouth in the morning and 1 whole tablet at night.Historical Med      vitamin D

## 2024-11-13 NOTE — PROGRESS NOTES
4 Eyes Skin Assessment     NAME:  Tye Resendez  YOB: 1950  MEDICAL RECORD NUMBER:  [id number]    The patient is being assessed for  Admission    I agree that at least one RN has performed a thorough Head to Toe Skin Assessment on the patient. ALL assessment sites listed below have been assessed.      Areas assessed by both nurses:    Head, Face, Ears, Shoulders, Back, Chest, Arms, Elbows, Hands, Sacrum. Buttock, Coccyx, Ischium, Legs. Feet and Heels, and Under Medical Devices         Does the Patient have a Wound? Yes wound(s) were present on assessment. LDA wound assessment was Initiated and completed by RN       Moi Prevention initiated by RN: Yes  Wound Care Orders initiated by RN: Yes    Pressure Injury (Stage 3,4, Unstageable, DTI, NWPT, and Complex wounds) if present, place Wound referral order by RN under : No    New Ostomies, if present place, Ostomy referral order under : No     Nurse 1 eSignature: Electronically signed by JESSICA CARO RN on 11/13/24 at 6:25 PM EST    **SHARE this note so that the co-signing nurse can place an eSignature**    Nurse 2 eSignature: Electronically signed by Tameka Garza RN on 11/13/24 at 7:31 PM EST

## 2024-11-13 NOTE — H&P
HOSPITALISTS HISTORY AND PHYSICAL    11/13/2024 4:47 PM    Patient Information:  TYE GARCIA is a 74 y.o. male [id number]  PCP:  Chris Persaud MD (Tel: [phone] )    Chief complaint: Positive blood culture       History of Present Illness:  Tye Garcia is a 74 y.o. male  with PMHx of hypertension, hyperlipidemia, bipolar disorder, BPH with chronic urinary retention s/p Guerra's catheter and CKD stage IV who was admitted on (11/9/24-11/11/24) for UTI, hematuria and Moderate bilateral hydroureteral nephrosis;L>R ; discharged home on Cipro being admitted for positive blood cultures. Received call from ED pharmacist that 1 set of blood cultures drawn on 11 send 9/24 in the ED came back positive for Pseudomonas.  Patient was also notified over the phone for positive blood culture and instructed come to the hospital for treatment.  Per patient report, he remained completely asymptomatic since discharge no reports of fever, chills, chest pain, SOB, nausea, vomiting, abdominal pain, urinary symptoms      REVIEW OF SYSTEMS:   Detailed 12 point ROS obtained which were negative except what mentioned above in HPI    Past Medical History:   has a past medical history of BPH with urinary obstruction, CKD (chronic kidney disease), stage IV (HCC), Enlarged prostate, Hydronephrosis, Hyperlipidemia, Hypertension, Schizoaffective disorder, bipolar type (HCC), and Schizophrenia (HCC).     Past Surgical History:   has a past surgical history that includes Leg Surgery (Left, 10/6/2024).     Medications:  No current facility-administered medications on file prior to encounter.     Current Outpatient Medications on File Prior to Encounter   Medication Sig Dispense Refill    ciprofloxacin (CIPRO) 500 MG tablet Take 1 tablet by mouth 2 times daily for 4 days 8 tablet 0    sodium bicarbonate 325 MG tablet Take 1 tablet by  mass; follows with urology.  No further workup recommended for the benign column of Cesar which is just normal renal parenchyma located in abnormal area.     S/p left tib/fib I&D and biopsy/excision (10/6/24)     DVT prophylaxis: Lovenox  Code status: Full    Admit as Observation I anticipate hospitalization spanning less than two midnights for investigation and treatment of the above medically necessary diagnoses.    Please note that some part of this chart was generated using Dragon dictation software. Although every effort was made to ensure the accuracy of this automated transcription, some errors in transcription may have occurred inadvertently. If you may need any clarification, please do not hesitate to contact me through [employer].       TRACY MERCER MD    11/13/2024 4:47 PM

## 2024-11-13 NOTE — PROGRESS NOTES
HOSPITALISTS PROGRESS NOTE    11/10/2024 9:49 AM        Name: Tye Resendez .              Admitted: 11/9/2024  Primary Care Provider: Chris Persaud MD (Tel: [phone])      Brief Course: This 74 y.o. male  with PMHx of hypertension, hyperlipidemia bipolar disorder, BPH with chronic urinary retention s/p Guerra's catheter and CKD stage stage IV who presented with hematuria and urinary retention.     Interval history:   Pt seen and examined today   Overnight events noted and interval ancillary notes reviewed.  On 2 L NC satting on 95%; wean as tolerated to keep sats over 93%  Afebrile, WBCs down to 8.3, blood cultures NGTD urine culture pending.  Hgb stable around 11.2.  Cr down to 2.4  Low potassium this morning; replacement ordered.  Check potassium level after placement  Guerra's in place; draining clear urine  Pt resting in bed; reported that he is feeling better today.  No more chills.  Denies any fever, chest pain, SOB, abdominal pain, nausea or vomiting      Assessment & Plan:       Moderate bilateral hydroureteral nephrosis;L>R  CT abdomen pelvis on admission showed marked thickening of urinary bladder walls with adjacent fat stranding suggestive of cystitis.  Moderate bilateral hydroureteronephrosis.    Urology consulted; Guerra's catheter in place.  May need SPT per urology.  Continue antibiotics.     Hematuria likely 2/2 cystitis/malpositioned Guerra's.  Guerra is repositioned in ED.    Febrile with Tmax 100.2 with elevated WBCs. UA s/o UTI with hematuria.  CT showed marked thickening of urinary bladder walls with adjacent fat stranding suggestive of cystitis  Continue Rocephin awaiting urine culture and sensitivities    UTI; urine culture growing> 100,000 Pseudomonas.  Continue Rocephin awaiting sensitivities     Sepsis; POA; presented with fever, tachycardia tachypnea and UTI.  Improved     Hx of urinary retention 2/2 BPH; s/p 
                     [facility] Note    Patient Active Problem List   Diagnosis    DIMA (acute kidney injury) (HCC)    Hematuria    Hyponatremia    Sepsis (HCC)    Soft tissue mass    Abscess of left leg    Leg mass, left    UTI (urinary tract infection)       Past Medical History:   has a past medical history of BPH with urinary obstruction, CKD (chronic kidney disease), stage IV (HCC), Enlarged prostate, Hydronephrosis, Hyperlipidemia, Hypertension, Schizoaffective disorder, bipolar type (HCC), and Schizophrenia (HCC).    Past Social History:   reports that he has quit smoking. He has never used smokeless tobacco. He reports that he does not currently use alcohol. He reports that he does not use drugs.    Subjective:    No complaints today  Guerra catheter draining well  Review of Systems   Constitutional:  Negative for activity change, appetite change, chills, fatigue, fever and unexpected weight change.   HENT:  Negative for congestion and facial swelling.    Eyes:  Negative for photophobia, discharge and redness.   Respiratory:  Negative for cough, chest tightness and shortness of breath.    Cardiovascular:  Negative for chest pain, palpitations and leg swelling.   Gastrointestinal:  Negative for abdominal distention, abdominal pain, blood in stool, constipation, diarrhea, nausea and vomiting.   Endocrine: Negative for cold intolerance, heat intolerance and polyuria.   Genitourinary:  Negative for decreased urine volume, difficulty urinating, flank pain and hematuria.   Musculoskeletal:  Negative for joint swelling and neck pain.   Neurological:  Negative for dizziness, seizures, syncope, speech difficulty, light-headedness and headaches.   Hematological:  Does not bruise/bleed easily.   Psychiatric/Behavioral:  Negative for agitation, confusion and hallucinations.        Objective:      /81   Pulse 70   Temp 98.8 °F (37.1 °C) (Oral)   Resp 18   Ht 1.727 m (5' 8\")   Wt 99.2 kg (218 lb 11.2 
  [facility] - Inpatient Rehabilitation Department   Phone: [phone]    Occupational Therapy    [x] Initial Evaluation            [] Daily Treatment Note         [x] Discharge Summary      Patient: Tye Resendez   : 1950   MRN: [id number]   Date of Service:  2024    Admitting Diagnosis:  UTI (urinary tract infection)  Current Admission Summary: 74 y.o. male  with PMHx of hypertension, hyperlipidemia bipolar disorder, BPH with chronic urinary retention s/p Guerra's catheter and CKD stage stage IV who presented with hematuria and urinary retention.  Patient reported that his Guerra's catheter replaced about 2 days ago and since then he has been having bloody urine and noted minimal urine output yesterday so he came to the ED for further evaluation.  Guerra's will reposition in the ER.  Patient reported chills but denied any fever, chest pain, SOB, URI symptoms, nausea, vomiting, hematemesis or melena.  On admission, patient was febrile with Tmax of 100.2, tachycardic tachypneic.  WBCs 19.3 K.  UA suggestive of UTI.  CT abdomen pelvis on admission showed marked thickening of urinary bladder walls with adjacent fat stranding suggestive of cystitis.  Moderate bilateral hydroureteronephrosis.  Colonic diverticulosis without evidence of acute diverticulitis.  Stable low-attenuation foci in liver consistent with benign entities such as hepatic cysts.   Past Medical History:  has a past medical history of BPH with urinary obstruction, CKD (chronic kidney disease), stage IV (HCC), Enlarged prostate, Hydronephrosis, Hyperlipidemia, Hypertension, Schizoaffective disorder, bipolar type (HCC), and Schizophrenia (HCC).  Past Surgical History:  has a past surgical history that includes Leg Surgery (Left, 10/6/2024).    Discharge Recommendations: Tye Resendez scored a 22/ on the AM-PAC ADL Inpatient form.  At this time, no further OT is recommended upon discharge due to patient at independent level.  
  Physician Progress Note      PATIENT:               KENDRA GARCIA  [facility] #:                  [id number]  :                       1950  ADMIT DATE:       2024 1:32 AM  DISCH DATE:        2024 4:24 PM  RESPONDING  PROVIDER #:        ANABELLE CLINE          QUERY TEXT:    Pt admitted with UTI.  Pt noted to have chronic indwelling urinary catheter   and kinney exchanged Friday. If possible, please document in the progress notes   and discharge summary if you are evaluating and/or treating any of the   following:    The medical record reflects the following:  Risk Factors: UTI  Clinical Indicators:  urine culture growing> 100,000 Pseudomonas.  Catheter dependent for some time. CT showed kinney catheter to be in good   position with bilateral hydronephrosis.  just had kinney exchanged Friday- chronic kinney  Treatment: urine culture, Rocephin, monitor labs, monitor vitals, urology   consult  Options provided:  -- UTI due to chronic indwelling urinary catheter  -- Other - I will add my own diagnosis  -- Disagree - Not applicable / Not valid  -- Disagree - Clinically unable to determine / Unknown  -- Refer to Clinical Documentation Reviewer    PROVIDER RESPONSE TEXT:    UTI is due to the chronic indwelling urinary catheter.    Query created by: Fanny Lindsey on 2024 8:52 AM      Electronically signed by:  ANABELLE CLINE 2024 9:45 AM          
Data- discharge order received, pt verbalized agreement to discharge, disposition to previous residence, no needs for HHC/DME.     Action- discharge instructions prepared and given to PATIENT, pt verbalized understanding. Medication information packet given r/t NEW and/or CHANGED prescriptions emphasizing name/purpose/side effects, pt verbalized understanding. Discharge instruction summary: Diet- GENERAL, Activity- AS TOLERATED, Primary Care Physician as follows: Chris Persaud -[phone] f/u appointment WITHIN 1 WEEK, prescription medications filled PERSONAL PHARMACY.    1. WEIGHT: Admit Weight - Scale: 93.4 kg (206 lb) (11/09/24 0203)        Today  Weight - Scale: 99.2 kg (218 lb 11.2 oz) (11/10/24 0832)       2. O2 SAT.: SpO2: 98 % (11/11/24 1145)    Response- Pt belongings gathered, IV removed. Disposition is home (no HHC/DME needs), transported with personal belongings, taken to lobby via w/c, no complications.    
Report received from Prudence PERLA. Assumed care.  
Urology Progress Note  [facility]    Provider: JOSE Oviedo CNP  Patient ID:  Admission Date: 2024 Name: Tye Resendez  OR Date: 2024 MRN: [id number]   Patient Location: [facility]-3367/3367-01 : 1950  Attending: Judy Thurman MD Date of Service: 11/10/2024  PCP: Chris Persaud MD     Diagnoses:  1. Severe sepsis (HCC)    2. Acute cystitis with hematuria    3. Urinary retention    4. Chronic kidney disease, unspecified CKD stage    5. Hydroureteronephrosis         Assessment/Plan:  73 yo male  Admitted last month for LLE infection  Has hx BPH and chronic outlet obstruction. Catheter dependent for some time.   Presents with hematuria and urinary retention. His kinney catheter was changed two days ago and had been bleeding ever since. Minimal output was noted. The kinney was then repositioned in the ER  CT showed kinney catheter to be in good position with bilateral hydronephrosis. Was noted to have interval resolution of bilateral hydro on CT 10/23/24. There is also concern for UTI as well.   On exam he is comfortable appearing. No pain. His kinney has a large amount of output of clear yellow urine. No hematuria or clots noted.   Tmax 100.2. wbc 19.3. cr 2.6 which looks to be baseline. Hgb 12.1     Recommendations:  - urine now clear yellow  - just had kinney exchanged Friday- chronic kinney  - home with kinney at discharge  - will sign out, call with  any questions.   The patient had a chance to ask questions which were answered. he understands the above plan.    Subjective:   Tye Resendez is a 74 y.o. male. He was seen and examined this morning. Today we discussed discharge with kinney and q4 week kinney ecchanges.      Objective:   Vitals:  Vitals:    11/10/24 0315   BP: 125/69   Pulse: 69   Resp: 16   Temp: 98.5 °F (36.9 °C)   SpO2: 95%       Intake/Output Summary (Last 24 hours) at 11/10/2024 0948  Last data filed at 11/10/2024 0554  Gross per 24 hour   Intake --   Output 2075 ml 
Date    CREATININE 2.3 (H) 11/11/2024    BUN 30 (H) 11/11/2024     11/11/2024    K 3.6 11/11/2024     11/11/2024    CO2 21 11/11/2024       
shower but doesn't feel like it's necessary   IADL Assistance:  facility completes laundry and meal prep, pt and spouse do light cleaning/vacuuming  Active :        [] Yes                 [x] No  Hand Dominance: [] Left                 [x] Right  Current Employment: retired.  Occupation: [employer], [employer]    Hobbies: playing guitar singing, activities at facility, courtyard garden and photographing birds  Recent Falls: Reports 1 fall in the last 6 months, fell in the dining room      Available Assistance at Discharge: 24 hr physical assistance available    Examination   Vision:   Vision Gross Assessment: Impaired (Reports he had reading glasses but \"someone took them\"  Hearing:   WFL  Observation:   General Observation:  Patient in semi-bautista's position. On RA, kinney catheter   Posture:   Forward head   Sensation:   denies numbness and tingling  Proprioception:    WFL  Tone:   Normotonic  ROM:   (L) Knee: fused from previous injury     (R) Knee: WFL  Strength:   (B) LE strength grossly WFL  Therapist Clinical Decision Making (Complexity): low complexity  Clinical Presentation: stable      Subjective  General: Patient is pleasant and agreeable to PT/OT evaluation.   Pain: 0/10  Pain Interventions: not applicable       Functional Mobility  Bed Mobility:  Supine to Sit: Independent  Scooting: Independent  Comments:  Transfers:  Sit to stand transfer: Independent  Stand to sit transfer: Independent  Comments: No device used   Ambulation:  Surface:level surface  Assistive Device: no device  Assistance: supervision  Distance: 75'  Gait Mechanics: Decreased gait speed, L hip circumduction to clear foot due to fused (L) knee  Comments:    Stair Mobility:  Stair mobility not completed on this date.  Comments:  Wheelchair Mobility:  No w/c mobility completed on this date.  Comments:  Balance:  Static Sitting Balance: good: independent with functional balance in unsupported position  Dynamic

## 2024-11-14 PROBLEM — N13.30 BILATERAL HYDRONEPHROSIS: Status: ACTIVE | Noted: 2024-11-14

## 2024-11-14 PROBLEM — I10 ESSENTIAL HYPERTENSION: Status: ACTIVE | Noted: 2024-11-14

## 2024-11-14 PROBLEM — N40.0 ENLARGED PROSTATE: Status: ACTIVE | Noted: 2024-11-14

## 2024-11-14 PROBLEM — F31.9 BIPOLAR DISORDER (HCC): Status: ACTIVE | Noted: 2024-11-14

## 2024-11-14 PROBLEM — E78.2 MIXED HYPERLIPIDEMIA: Status: ACTIVE | Noted: 2024-11-14

## 2024-11-14 PROBLEM — E66.09 CLASS 1 OBESITY DUE TO EXCESS CALORIES WITH BODY MASS INDEX (BMI) OF 30.0 TO 30.9 IN ADULT: Status: ACTIVE | Noted: 2024-11-14

## 2024-11-14 PROBLEM — F25.9 SCHIZOAFFECTIVE DISORDER (HCC): Status: ACTIVE | Noted: 2024-11-14

## 2024-11-14 PROBLEM — E66.811 CLASS 1 OBESITY DUE TO EXCESS CALORIES WITH BODY MASS INDEX (BMI) OF 30.0 TO 30.9 IN ADULT: Status: ACTIVE | Noted: 2024-11-14

## 2024-11-14 PROBLEM — Z96.0 INDWELLING CATHETER PRESENT ON ADMISSION: Status: ACTIVE | Noted: 2024-11-14

## 2024-11-14 PROBLEM — B96.5 URINARY TRACT INFECTION DUE TO PSEUDOMONAS AERUGINOSA: Status: ACTIVE | Noted: 2024-11-09

## 2024-11-14 LAB
ANION GAP SERPL CALCULATED.3IONS-SCNC: 12 MMOL/L (ref 3–16)
BACTERIA BLD CULT: ABNORMAL
BACTERIA BLD CULT: ABNORMAL
BASOPHILS # BLD: 0.1 K/UL (ref 0–0.2)
BASOPHILS NFR BLD: 1.1 %
BUN SERPL-MCNC: 38 MG/DL (ref 7–20)
CALCIUM SERPL-MCNC: 9.2 MG/DL (ref 8.3–10.6)
CHLORIDE SERPL-SCNC: 109 MMOL/L (ref 99–110)
CO2 SERPL-SCNC: 18 MMOL/L (ref 21–32)
CREAT SERPL-MCNC: 2.5 MG/DL (ref 0.8–1.3)
DEPRECATED RDW RBC AUTO: 17 % (ref 12.4–15.4)
EOSINOPHIL # BLD: 0.5 K/UL (ref 0–0.6)
EOSINOPHIL NFR BLD: 7.8 %
GFR SERPLBLD CREATININE-BSD FMLA CKD-EPI: 26 ML/MIN/{1.73_M2}
GLUCOSE SERPL-MCNC: 105 MG/DL (ref 70–99)
HCT VFR BLD AUTO: 37.2 % (ref 40.5–52.5)
HGB BLD-MCNC: 12 G/DL (ref 13.5–17.5)
LYMPHOCYTES # BLD: 1.8 K/UL (ref 1–5.1)
LYMPHOCYTES NFR BLD: 29.1 %
MCH RBC QN AUTO: 28.1 PG (ref 26–34)
MCHC RBC AUTO-ENTMCNC: 32.2 G/DL (ref 31–36)
MCV RBC AUTO: 87.1 FL (ref 80–100)
MONOCYTES # BLD: 0.7 K/UL (ref 0–1.3)
MONOCYTES NFR BLD: 10.6 %
NEUTROPHILS # BLD: 3.2 K/UL (ref 1.7–7.7)
NEUTROPHILS NFR BLD: 51.4 %
ORGANISM: ABNORMAL
ORGANISM: ABNORMAL
PLATELET # BLD AUTO: 262 K/UL (ref 135–450)
PMV BLD AUTO: 7.2 FL (ref 5–10.5)
POTASSIUM SERPL-SCNC: 3.9 MMOL/L (ref 3.5–5.1)
RBC # BLD AUTO: 4.27 M/UL (ref 4.2–5.9)
SODIUM SERPL-SCNC: 139 MMOL/L (ref 136–145)
WBC # BLD AUTO: 6.2 K/UL (ref 4–11)

## 2024-11-14 PROCEDURE — 96372 THER/PROPH/DIAG INJ SC/IM: CPT

## 2024-11-14 PROCEDURE — 99223 1ST HOSP IP/OBS HIGH 75: CPT | Performed by: INTERNAL MEDICINE

## 2024-11-14 PROCEDURE — 85025 COMPLETE CBC W/AUTO DIFF WBC: CPT

## 2024-11-14 PROCEDURE — 2580000003 HC RX 258: Performed by: INTERNAL MEDICINE

## 2024-11-14 PROCEDURE — 97165 OT EVAL LOW COMPLEX 30 MIN: CPT

## 2024-11-14 PROCEDURE — 6370000000 HC RX 637 (ALT 250 FOR IP): Performed by: INTERNAL MEDICINE

## 2024-11-14 PROCEDURE — 6360000002 HC RX W HCPCS: Performed by: INTERNAL MEDICINE

## 2024-11-14 PROCEDURE — G0378 HOSPITAL OBSERVATION PER HR: HCPCS

## 2024-11-14 PROCEDURE — 96375 TX/PRO/DX INJ NEW DRUG ADDON: CPT

## 2024-11-14 PROCEDURE — 97116 GAIT TRAINING THERAPY: CPT

## 2024-11-14 PROCEDURE — 97161 PT EVAL LOW COMPLEX 20 MIN: CPT

## 2024-11-14 PROCEDURE — 36415 COLL VENOUS BLD VENIPUNCTURE: CPT

## 2024-11-14 PROCEDURE — 80048 BASIC METABOLIC PNL TOTAL CA: CPT

## 2024-11-14 PROCEDURE — 96366 THER/PROPH/DIAG IV INF ADDON: CPT

## 2024-11-14 PROCEDURE — 97535 SELF CARE MNGMENT TRAINING: CPT

## 2024-11-14 RX ORDER — SODIUM BICARBONATE 650 MG/1
1300 TABLET ORAL 3 TIMES DAILY
Status: DISCONTINUED | OUTPATIENT
Start: 2024-11-14 | End: 2024-11-14

## 2024-11-14 RX ORDER — HYDRALAZINE HYDROCHLORIDE 20 MG/ML
5 INJECTION INTRAMUSCULAR; INTRAVENOUS EVERY 4 HOURS PRN
Status: DISCONTINUED | OUTPATIENT
Start: 2024-11-14 | End: 2024-11-15 | Stop reason: HOSPADM

## 2024-11-14 RX ORDER — SODIUM BICARBONATE 650 MG/1
650 TABLET ORAL 3 TIMES DAILY
Status: DISCONTINUED | OUTPATIENT
Start: 2024-11-14 | End: 2024-11-14

## 2024-11-14 RX ORDER — SODIUM BICARBONATE 650 MG/1
1300 TABLET ORAL 3 TIMES DAILY
Status: DISCONTINUED | OUTPATIENT
Start: 2024-11-14 | End: 2024-11-15 | Stop reason: HOSPADM

## 2024-11-14 RX ADMIN — Medication 1000 UNITS: at 08:29

## 2024-11-14 RX ADMIN — CARVEDILOL 6.25 MG: 6.25 TABLET, FILM COATED ORAL at 08:29

## 2024-11-14 RX ADMIN — OLANZAPINE 5 MG: 5 TABLET, FILM COATED ORAL at 08:29

## 2024-11-14 RX ADMIN — SODIUM CHLORIDE, PRESERVATIVE FREE 10 ML: 5 INJECTION INTRAVENOUS at 08:31

## 2024-11-14 RX ADMIN — OLANZAPINE 10 MG: 5 TABLET, FILM COATED ORAL at 21:10

## 2024-11-14 RX ADMIN — SODIUM BICARBONATE 1300 MG: 650 TABLET ORAL at 14:18

## 2024-11-14 RX ADMIN — SODIUM BICARBONATE 1300 MG: 650 TABLET ORAL at 21:11

## 2024-11-14 RX ADMIN — FINASTERIDE 5 MG: 5 TABLET, FILM COATED ORAL at 08:29

## 2024-11-14 RX ADMIN — CEFEPIME 1000 MG: 1 INJECTION, POWDER, FOR SOLUTION INTRAMUSCULAR; INTRAVENOUS at 08:32

## 2024-11-14 RX ADMIN — ENOXAPARIN SODIUM 40 MG: 100 INJECTION SUBCUTANEOUS at 08:29

## 2024-11-14 RX ADMIN — NIFEDIPINE 90 MG: 30 TABLET, FILM COATED, EXTENDED RELEASE ORAL at 08:29

## 2024-11-14 RX ADMIN — CEFEPIME 1000 MG: 1 INJECTION, POWDER, FOR SOLUTION INTRAMUSCULAR; INTRAVENOUS at 21:10

## 2024-11-14 RX ADMIN — SODIUM CHLORIDE, PRESERVATIVE FREE 10 ML: 5 INJECTION INTRAVENOUS at 14:18

## 2024-11-14 RX ADMIN — ASPIRIN 325 MG: 325 TABLET, COATED ORAL at 08:29

## 2024-11-14 RX ADMIN — TAMSULOSIN HYDROCHLORIDE 0.4 MG: 0.4 CAPSULE ORAL at 08:29

## 2024-11-14 RX ADMIN — HYDRALAZINE HYDROCHLORIDE 5 MG: 20 INJECTION INTRAMUSCULAR; INTRAVENOUS at 14:18

## 2024-11-14 RX ADMIN — CARVEDILOL 6.25 MG: 6.25 TABLET, FILM COATED ORAL at 17:40

## 2024-11-14 RX ADMIN — SODIUM CHLORIDE, PRESERVATIVE FREE 10 ML: 5 INJECTION INTRAVENOUS at 21:05

## 2024-11-14 ASSESSMENT — ENCOUNTER SYMPTOMS
CONSTIPATION: 0
EYE REDNESS: 0
SHORTNESS OF BREATH: 0
ABDOMINAL PAIN: 0
DIARRHEA: 0
SINUS PAIN: 0
SORE THROAT: 0
NAUSEA: 0
WHEEZING: 0
COUGH: 0
SINUS PRESSURE: 0
BACK PAIN: 0
EYE DISCHARGE: 0
RHINORRHEA: 0

## 2024-11-14 ASSESSMENT — PAIN SCALES - GENERAL: PAINLEVEL_OUTOF10: 0

## 2024-11-14 NOTE — PROGRESS NOTES
Pharmacy Home Medication Reconciliation Note    A medication reconciliation has been completed for Tye Resendez 1950    Pharmacy: [address] [place] [address]   Information provided by: Ivana from facility  on phone     The patient's home medication list is as follows:  No current facility-administered medications on file prior to encounter.     Current Outpatient Medications on File Prior to Encounter   Medication Sig Dispense Refill    sodium bicarbonate 325 MG tablet Take 1 tablet by mouth 3 times daily (Patient taking differently: Take 4 tablets by mouth 2 times daily Take 4 tablets by mouth twice a day) 90 tablet 5    aspirin 325 MG EC tablet Take 1 tablet by mouth in the morning and at bedtime 30 tablet 3    finasteride (PROSCAR) 5 MG tablet Take 1 tablet by mouth daily 30 tablet 3    tamsulosin (FLOMAX) 0.4 MG capsule Take 1 capsule by mouth daily 30 capsule 3    carvedilol (COREG) 6.25 MG tablet Take 1 tablet by mouth 2 times daily (with meals)      NIFEdipine (ADALAT CC) 90 MG extended release tablet Take 1 tablet by mouth daily Take one tablet by mouth once a day on an empty stomach.      OLANZapine (ZYPREXA) 10 MG tablet Take 0.5-1 tablets by mouth See Admin Instructions Take 1/2 tablet by mouth in the morning and 1 whole tablet at night.      vitamin D (CHOLECALCIFEROL) 25 MCG (1000 UT) TABS tablet Take 1 tablet by mouth daily Indications: Hyperparathyroidism      melatonin 3 MG TABS tablet Take 1 tablet by mouth daily      ciprofloxacin (CIPRO) 500 MG tablet Take 1 tablet by mouth 2 times daily for 4 days (Patient not taking: Reported on 11/13/2024) 8 tablet 0       Patient is no longer taking cipro    Of note, patient did take morning medications .    Timing of last doses updated.    Thank you,  Emmy Cardona [facility]

## 2024-11-14 NOTE — PROGRESS NOTES
V2.0    [facility] Progress Note      Name:  Tye Resendez /Age/Sex: 1950  (74 y.o. male)   MRN & CSN:  [id number] & [id number] Encounter Date/Time: 2024 12:31 PM EST   Location:  [id number] PCP: Chris Persaud MD     Attending:Sanjana Corley MD       Hospital Day: 2    Assessment and Recommendations   Tye Resendez is a 74 y.o. male with PMHx of hypertension, hyperlipidemia, bipolar disorder, BPH with chronic urinary retention with chronic Guerra catheter and CKD stage IV, admitted on (24-24) for UTI, hematuria and Moderate bilateral hydroureteral nephrosis;L>R ; discharged home on Ciprofloxacin, called back for further management of positive blood cultures.     Plan:        Gram-negative bacteremia:  1/2 set of blood cultures obtained on 24 grew Pseudomonas.   Repeat blood cultures ordered  Continue empiric cefepime pending ID consult.     UTI: urine culture on 24 grew > 100,000 Pseudomonas.    Discharged on ciprofloxacin, currently on cefepime.    Moderate bilateral hydroureteral nephrosis; L>R  CT abdomen pelvis on 2024 showed marked thickening of urinary bladder walls with adjacent fat stranding suggestive of cystitis.  Moderate bilateral hydroureteronephrosis.    Guerra catheter was repositioned in the ED.    Urology input appreciated; patient may need SPT per their recs     Hx of urinary retention 2/2 BPH:  Continue indwelling Guerra's catheter, Flomax and Proscar.     CKD stage IV with metabolic acidosis:  Creatinine 2.3 on discharge on 2024 (Baseline around 2.4-2.5).    Started on sodium bicarb.  Nephrology consulted.     Uncontrolled hypertension: continue current regimen and monitor BP closely     Hx of renal mass:  No further workup recommended for the benign column of Cesar which is just normal renal parenchyma located in abnormal area.  Follow-up with urology.     s/p left tib/fib I&D and biopsy/excision (10/6/24).         Diet ADULT DIET; Regular  intact, grossly non-focal.  Psychiatric: Alert and oriented x 3, thought content appropriate, normal insight  Capillary Refill: Brisk, 3 seconds, normal   Peripheral Pulses: +2 palpable, equal bilaterally     Medications:   Medications:    sodium bicarbonate  650 mg Oral TID    sodium chloride flush  5-40 mL IntraVENous 2 times per day    enoxaparin  40 mg SubCUTAneous Daily    carvedilol  6.25 mg Oral BID WC    finasteride  5 mg Oral Daily    NIFEdipine  90 mg Oral Daily    tamsulosin  0.4 mg Oral Daily    Vitamin D  1,000 Units Oral Daily    OLANZapine  10 mg Oral Nightly    OLANZapine  5 mg Oral Daily    aspirin  325 mg Oral Daily    cefepime  1,000 mg IntraVENous Q12H      Infusions:    sodium chloride       PRN Meds: sodium chloride flush, 5-40 mL, PRN  sodium chloride, , PRN  potassium chloride, 40 mEq, PRN   Or  potassium alternative oral replacement, 40 mEq, PRN   Or  potassium chloride, 10 mEq, PRN  magnesium sulfate, 2,000 mg, PRN  ondansetron, 4 mg, Q8H PRN   Or  ondansetron, 4 mg, Q6H PRN  polyethylene glycol, 17 g, Daily PRN  acetaminophen, 650 mg, Q6H PRN   Or  acetaminophen, 650 mg, Q6H PRN  melatonin, 3 mg, Nightly PRN        Labs and Imaging   XR CHEST PORTABLE    Result Date: 11/9/2024  EXAMINATION: ONE XRAY VIEW OF THE CHEST 11/9/2024 1:13 am COMPARISON: None. HISTORY: ORDERING SYSTEM PROVIDED HISTORY: SOB TECHNOLOGIST PROVIDED HISTORY: Reason for exam:->SOB FINDINGS: The mediastinum is unremarkable. The cardiac silhouette is normal size. The lungs are clear.     There is no evidence of acute chest disease.     CT ABDOMEN PELVIS WO CONTRAST Additional Contrast? None    Result Date: 11/9/2024  EXAMINATION: CT OF THE ABDOMEN AND PELVIS WITHOUT CONTRAST 11/9/2024 1:13 am TECHNIQUE: CT of the abdomen and pelvis was performed without the administration of intravenous contrast. Multiplanar reformatted images are provided for review. Automated exposure control, iterative reconstruction, and/or weight

## 2024-11-14 NOTE — PROGRESS NOTES
[facility] - Inpatient Rehabilitation Department   Phone: [phone]    Occupational Therapy    [x] Initial Evaluation            [] Daily Treatment Note         [x] Discharge Summary      Patient: Tye Resendez   : 1950   MRN: [id number]   Date of Service:  2024    Admitting Diagnosis:  Bacteremia    Current Admission Summary:  Tye Resendez is a 74 y.o. male  with PMHx of hypertension, hyperlipidemia, bipolar disorder, BPH with chronic urinary retention s/p Guerra's catheter and CKD stage IV who was admitted on (24-24) for UTI, hematuria and Moderate bilateral hydroureteral nephrosis;L>R ; discharged home on Cipro being admitted for positive blood cultures. Received call from ED pharmacist that 1 set of blood cultures drawn on 11 send 9/24 in the ED came back positive for Pseudomonas.  Patient was also notified over the phone for positive blood culture and instructed come to the hospital for treatment.  Per patient report, he remained completely asymptomatic since discharge no reports of fever, chills, chest pain, SOB, nausea, vomiting, abdominal pain, urinary symptoms     Past Medical History:  has a past medical history of BPH with urinary obstruction, CKD (chronic kidney disease), stage IV (HCC), Enlarged prostate, Hydronephrosis, Hyperlipidemia, Hypertension, Schizoaffective disorder, bipolar type (HCC), and Schizophrenia ([facility]).  Past Surgical History:  has a past surgical history that includes Leg Surgery (Left, 10/6/2024).    Discharge Recommendations: Tye Resendez scored a  on the AM-[facility] ADL Inpatient form.  At this time, no further OT is recommended upon discharge due to baseline for ADLs.  Recommend patient returns to prior setting with prior services.       DME Required For Discharge: patient has all required DME for discharge    Precautions/Restrictions: high fall risk, up as tolerated  Weight Bearing Restrictions: no restrictions  [] Right Upper Extremity  []  Left Upper Extremity [] Right Lower Extremity  [] Left Lower Extremity     Required Braces/Orthotics: no braces required   [] Right  [] Left  Positional Restrictions:no positional restrictions      Pre-Admission Information   Lives With: spouse                  Type of Home: assisted living, [facility] at [facility]  Home Layout: one level  Home Access: level entry  Bathroom Layout: walker accessible, wheelchair accessible, walk in shower  Bathroom Equipment: grab bars in shower, grab bars around toilet, shower chair, hand held shower head  Toilet Height: standard height  Home Equipment: no prior equipment  Transfer Assistance: Independent without use of device  Ambulation Assistance:Independent without use of device  ADL Assistance: independent with all ADL's  IADL Assistance:  facility completes laundry and meal prep, pt and spouse do light cleaning/vacuuming  Active :        [] Yes                 [x] No  Hand Dominance: [] Left                 [x] Right  Current Employment: retired.  Occupation: [employer], [employer]    Hobbies: playing [employer] singing, activities at facility, [employer] garden and photographing birds  Recent Falls: Pt denies fall since last admission     Available Assistance at Discharge: 24 hr physical assistance available        Examination   Vision:   Vision Corrective Device: wears glasses for reading  Hearing:   WFL  Perception:   WFL  Observation:   General Observation:  left piv, tele monitor, room air, indwelling kinney catheter   Posture:   Rounded shoulders, significant limp when walking, see PT for gait analysis, unable to bend left knee  Sensation:   denies numbness and tingling  Proprioception:    WFL  Tone:   Normotonic  Coordination Testing:   WFL    ROM:   (B) UE AROM WFL  Strength:   (B) UE strength grossly WFL    Therapist Clinical Decision Making (Complexity): low complexity  Clinical Presentation: stable      Subjective  General: Sitting in

## 2024-11-14 NOTE — PROGRESS NOTES
[facility] - Inpatient Rehabilitation Department   Phone: [phone]    Physical Therapy    [x] Initial Evaluation            [] Daily Treatment Note         [x] Discharge Summary      Patient: Tye Resendez   : 1950   MRN: [id number]   Date of Service:  2024  Admitting Diagnosis: Bacteremia  Current Admission Summary: Tye Resendez is a 74 y.o. male  with PMHx of hypertension, hyperlipidemia, bipolar disorder, BPH with chronic urinary retention s/p Guerra's catheter and CKD stage IV who was admitted on (24-24) for UTI, hematuria and Moderate bilateral hydroureteral nephrosis;L>R ; discharged home on Cipro being admitted for positive blood cultures. Received call from ED pharmacist that 1 set of blood cultures drawn on 11 send 9/24 in the ED came back positive for Pseudomonas.  Patient was also notified over the phone for positive blood culture and instructed come to the hospital for treatment.  Per patient report, he remained completely asymptomatic since discharge no reports of fever, chills, chest pain, SOB, nausea, vomiting, abdominal pain, urinary symptoms   Past Medical History:  has a past medical history of BPH with urinary obstruction, CKD (chronic kidney disease), stage IV (HCC), Enlarged prostate, Hydronephrosis, Hyperlipidemia, Hypertension, Schizoaffective disorder, bipolar type (HCC), and Schizophrenia (HCC).  Past Surgical History:  has a past surgical history that includes Leg Surgery (Left, 10/6/2024).  Discharge Recommendations: Tye Resendez scored a 23/24 on the AM-PAC short mobility form.  At this time, no further PT is recommended upon discharge due to functioning at his baseline status with mobility.  Recommend patient returns to prior setting with prior services.     DME Required For Discharge: patient has all required DME for discharge  Precautions/Restrictions: low fall risk, up as tolerated  Weight Bearing Restrictions: no restrictions  [] Right Upper  Extremity  [] Left Upper Extremity [] Right Lower Extremity  [] Left Lower Extremity     Required Braces/Orthotics: no braces required   [] Right  [] Left  Positional Restrictions:no positional restrictions    Pre-Admission Information   Lives With: spouse                  Type of Home: assisted living, [place] [place] at [facility]  Home Layout: one level  Home Access: level entry  Bathroom Layout: walker accessible, wheelchair accessible, walk in shower  Bathroom Equipment: grab bars in shower, grab bars around toilet, shower chair, hand held shower head  Toilet Height: standard height  Home Equipment: no prior equipment  Transfer Assistance: Independent without use of device  Ambulation Assistance:Independent without use of device  ADL Assistance: independent with all ADL's  IADL Assistance:  facility completes laundry and meal prep, pt and spouse do light cleaning/vacuuming  Active :        [] Yes                 [x] No  Hand Dominance: [] Left                 [x] Right  Current Employment: retired.  Occupation: [employer], [employer]    Hobbies: playing [employer] singing, activities at facility, [employer] garden and photographing birds  Recent Falls: Pt denies fall since last admission     Available Assistance at Discharge: 24 hr physical assistance available     Examination   Vision:   Vision Gross Assessment: Impaired and Vision Corrective Device: wears glasses for reading    Hearing:   WFL  Posture:   Fair  Sensation:   denies numbness and tingling  Proprioception:    WFL  Tone:   Normotonic  Coordination Testing:   WFL    ROM:   (B) LE AROM WFL, except L knee fused in extension full PROM, limited AROM ankle, foot drop  Strength:   R LE strength grossly 5/5, L LE hip flexion 4/5, ankle +2/5 DF  Therapist Clinical Decision Making (Complexity): low complexity  Clinical Presentation: stable      Subjective  General: Pt supine in bed upon arrival and agreeable to working with PT. Pt

## 2024-11-14 NOTE — PROGRESS NOTES
Aneesh from security came up and the both of us counted pt's money in his wallet. Pt  has 24 dollars in his wallet. Four 5 dollars and 4 one dollar bills. This have been verified with Aneesh from security.

## 2024-11-14 NOTE — PROGRESS NOTES
Shift assessment completed. Routine vitals completed. Scheduled medications given. Patient is awake, alert and oriented. Respirations are unlabored. Patient does not appear to be in distress. Pt currently in bed eating and eyes are open. Call light within reach.

## 2024-11-14 NOTE — CARE COORDINATION
tablet by mouth 2 times daily (with meals)      NIFEdipine (ADALAT CC) 90 MG extended release tablet Take 1 tablet by mouth daily Take one tablet by mouth once a day on an empty stomach.      OLANZapine (ZYPREXA) 10 MG tablet Take 0.5-1 tablets by mouth See Admin Instructions Take 1/2 tablet by mouth in the morning and 1 whole tablet at night.      vitamin D (CHOLECALCIFEROL) 25 MCG (1000 UT) TABS tablet Take 1 tablet by mouth daily Indications: Hyperparathyroidism      melatonin 3 MG TABS tablet Take 1 tablet by mouth daily      ciprofloxacin (CIPRO) 500 MG tablet Take 1 tablet by mouth 2 times daily for 4 days (Patient not taking: Reported on 11/13/2024) 8 tablet 0       Objective    BP (!) 154/76   Pulse 68   Temp 97.3 °F (36.3 °C) (Oral)   Resp 18   Ht 1.727 m (5' 7.99\")   Wt 90.9 kg (200 lb 6.4 oz)   SpO2 96%   BMI 30.48 kg/m²     LABS:  WBC:    Lab Results   Component Value Date/Time    WBC 6.2 11/14/2024 05:44 AM     H/H:    Lab Results   Component Value Date/Time    HGB 12.0 11/14/2024 05:44 AM    HCT 37.2 11/14/2024 05:44 AM     PTT:  No results found for: \"APTT\"[APTT}  PT/INR:    Lab Results   Component Value Date/Time    PROTIME 15.7 10/04/2024 05:58 PM    INR 1.23 10/04/2024 05:58 PM     HgBA1c:  No results found for: \"LABA1C\"    Assessment   Moi Risk Score: Moi Scale Score: 21    Patient Active Problem List   Diagnosis Code    DIMA (acute kidney injury) ([facility]) N17.9    Hematuria R31.9    Hyponatremia E87.1    Sepsis ([facility]) A41.9    Soft tissue mass M79.89    Abscess of left leg L02.416    Leg mass, left R22.42    Urinary tract infection due to Pseudomonas aeruginosa N39.0, B96.5    Bacteremia R78.81    Class 1 obesity due to excess calories with body mass index (BMI) of 30.0 to 30.9 in adult E66.811, E66.09, Z68.30    Schizoaffective disorder ([facility]) F25.9    Bipolar disorder (HCC) F31.9    Mixed hyperlipidemia E78.2    Essential hypertension I10    Bilateral hydronephrosis N13.30    Enlarged  prostate N40.0    Indwelling catheter present on admission Z96.0       Measurements:     Incision 10/06/24 Leg Left;Lower (Active)   Dressing Status Dry;Intact;Clean 11/14/24 0827   Dressing Change Due 11/11/24 11/14/24 0827   Incision Cleansed Soap and water 11/14/24 0827   Dressing/Treatment Roll gauze 11/14/24 0827   Incision Assessment Dry 11/14/24 0827   Drainage Amount None (dry) 11/14/24 0827   Drainage Description Other (Comment) 11/14/24 0827   Odor None 11/14/24 0827   Gilda-incision Assessment Dry/flaky 11/14/24 0827   Number of days: 39     Patient seen for left lower leg wound. Patient from nursing home. Patient agreeable to visit.    Chart reviewed, patient had surgery for abscess I & D with Dr Lorenzo. Wound is dry with scab. There is surrounding erythema, but does not feel warm.  Surrounding skin is dry and flaky.    Will keep clean with betadine, cover with dry dressing to protect area. Please consider follow up with wound center.         Clinical phot 10/22/24 at Dr Lorenzo's office    Response to treatment:  Well tolerated by patient.     Pain Assessment:  Severity:  0 / 10  Quality of pain: N/A  Wound Pain Timing/Severity: none  Premedicated: No    Plan   Plan of Care:    Left lower leg wound. Paint with betadine swab. Let dry and cover with dry dressing    Specialty Bed Required :  N/A    Current Diet: ADULT DIET; Regular  Dietician consult:  Yes    Discharge Plan:  Placement for patient upon discharge: Skilled Nursing Facility SNF   Patient appropriate for Outpatient Wound Care Center: Yes    Referrals:      Patient/Caregiver Teaching:  Level of patient/caregiver understanding able to:  Indicates understanding      Electronically signed by  BRANDAN RIDDLEN, RN, CWOCN  Inpatient  Wound/Ostomy Care  [phone] on 11/14/2024 at 3:00 PM

## 2024-11-14 NOTE — CONSULTS
Infectious Diseases   Consult Note        Admission Date: 11/13/2024  Hospital Day: Hospital Day: 2   Attending: Sanjana Corley MD  Date of service: 11/14/24     Reason for admission: Bacteremia [R78.81]    Chief complaint/ Reason for consult: Gram-negative bacteremia and complicated UTI    Microbiology:        I have reviewed allavailable micro lab data and cultures    Results       Procedure Component Value Units Date/Time    Culture, Blood 1 [[id number]] Collected: 11/13/24 1652    Order Status: Sent Specimen: Blood Updated: 11/13/24 2054    Culture, Blood 2 [[id number]] Collected: 11/13/24 1652    Order Status: Sent Specimen: Blood Updated: 11/13/24 2054    MRSA DNA Probe, Nasal [[id number]] Collected: 11/09/24 1256    Order Status: Completed Specimen: Nares Updated: 11/11/24 0250     MRSA SCREEN RT-PCR --     Negative  MRSA DNA not detected.  Normal Range: Not detected      Narrative:      ORDER#: [id number]                          ORDERED BY: TRACY MERCER  SOURCE: Nares                              COLLECTED:  11/09/24 12:56  ANTIBIOTICS AT IGNACIO.:                      RECEIVED :  11/10/24 01:50    Culture, MRSA, Screening [[id number]]     Order Status: Canceled Specimen: Nares     COVID-19 & Influenza Combo [[id number]] Collected: 11/09/24 0246    Order Status: Completed Specimen: Nasopharyngeal Swab Updated: 11/09/24 0320     SARS-CoV-2 RNA, RT PCR NOT DETECTED     Comment: Not Detected results do not preclude SARS-CoV-2 infection and  should not be used as the sole basis for patient management  decisions.  Results must be combined with clinical observations,  patient history, and epidemiological information.  Testing was performed using NEIL SHUN SARS-CoV-2 and Influenza A/B  nucleic acid assay. This test is a multiplex Real-Time Reverse  Transcriptase Polymerase Chain Reaction (RT-PCR)-based in vitro  diagnostic test intended for the qualitative detection of nucleic  acids from SARS-CoV-2,  NANETTE Gibson [facility] Infectious Disease  [address] , [address] (Medical office Building)  [place], [address]  [facility] Clinic days: Wednesday AM

## 2024-11-14 NOTE — CONSULTS
symptoms ; Benign hypertension with stage 3b chronic kidney disease FINDINGS: Lower chest: The lung bases are clear. Liver: Evaluation limited by the absence of intravenous contrast.  There are multiple scattered low-attenuation foci are present, similar in size and distribution compared to prior examination and consistent with benign entities such as hepatic cysts or hemangiomas. Biliary system/Gallbladder: No intrahepatic or extrahepatic biliary ductal dilation. The gallbladder is decompressed. Spleen: Normal. Pancreas: Normal. Adrenals: Normal. Kidneys/Bladder: The kidneys demonstrate chronic atrophy and cortical scarring.  Interval resolution of bilateral hydronephrosis, which was secondary to reflux from a distended urinary bladder.  Relative hyperdense fluid within the urinary bladder is consistent with urine homogeneously mixed with blood products and likely sequelae from prior malpositioned Guerra catheter.  A Guerra catheter is appropriate position within the urinary bladder. Pelvic organs: Prostate is stable in appearance. GI tract: The stomach is normal in appearance. The intestines demonstrate no evidence of focal thickening or obstruction. The appendix is visualized and normal in appearance. Colonic diverticulosis is present without evidence of acute diverticulitis. Peritoneum: No evidence of ascites or peritoneal nodularity. Lymph nodes: No evidence of suspicious lymphadenopathy. Vasculature: Vascular calcifications are present along the abdominal aorta without aneurysmal dilation. Soft Tissues/Bones: No acute soft tissue abnormalities are evident. Multilevel degenerative joint disease is present along the visualized spine. No acute osseous abnormalities or suspicious lesions are present.  Bilateral pars defect noted at L5 with subsequent anterolisthesis of L5 on S1.     Interval resolution of hydronephrosis, which was likely related to reflux from significant urinary bladder distension on prior

## 2024-11-15 VITALS
RESPIRATION RATE: 18 BRPM | DIASTOLIC BLOOD PRESSURE: 69 MMHG | TEMPERATURE: 97.6 F | SYSTOLIC BLOOD PRESSURE: 109 MMHG | HEIGHT: 68 IN | WEIGHT: 200.4 LBS | HEART RATE: 75 BPM | OXYGEN SATURATION: 94 % | BODY MASS INDEX: 30.37 KG/M2

## 2024-11-15 LAB
ALBUMIN SERPL ELPH-MCNC: 3 G/DL (ref 3.1–4.9)
ALBUMIN SERPL-MCNC: 3.5 G/DL (ref 3.4–5)
ALPHA1 GLOB SERPL ELPH-MCNC: 0.4 G/DL (ref 0.2–0.4)
ALPHA2 GLOB SERPL ELPH-MCNC: 1 G/DL (ref 0.4–1.1)
ANION GAP SERPL CALCULATED.3IONS-SCNC: 11 MMOL/L (ref 3–16)
B-GLOBULIN SERPL ELPH-MCNC: 1.4 G/DL (ref 0.9–1.6)
BASOPHILS # BLD: 0.1 K/UL (ref 0–0.2)
BASOPHILS NFR BLD: 1.2 %
BUN SERPL-MCNC: 36 MG/DL (ref 7–20)
CALCIUM SERPL-MCNC: 9.3 MG/DL (ref 8.3–10.6)
CHLORIDE SERPL-SCNC: 107 MMOL/L (ref 99–110)
CO2 SERPL-SCNC: 21 MMOL/L (ref 21–32)
CREAT SERPL-MCNC: 2.3 MG/DL (ref 0.8–1.3)
DEPRECATED RDW RBC AUTO: 17.1 % (ref 12.4–15.4)
EOSINOPHIL # BLD: 0.4 K/UL (ref 0–0.6)
EOSINOPHIL NFR BLD: 5.5 %
GAMMA GLOB SERPL ELPH-MCNC: 1.7 G/DL (ref 0.6–1.8)
GFR SERPLBLD CREATININE-BSD FMLA CKD-EPI: 29 ML/MIN/{1.73_M2}
GLUCOSE SERPL-MCNC: 113 MG/DL (ref 70–99)
HCT VFR BLD AUTO: 36.9 % (ref 40.5–52.5)
HGB BLD-MCNC: 12 G/DL (ref 13.5–17.5)
LYMPHOCYTES # BLD: 1.7 K/UL (ref 1–5.1)
LYMPHOCYTES NFR BLD: 25.9 %
MAGNESIUM SERPL-MCNC: 2.35 MG/DL (ref 1.8–2.4)
MCH RBC QN AUTO: 28.1 PG (ref 26–34)
MCHC RBC AUTO-ENTMCNC: 32.7 G/DL (ref 31–36)
MCV RBC AUTO: 85.9 FL (ref 80–100)
MONOCYTES # BLD: 0.5 K/UL (ref 0–1.3)
MONOCYTES NFR BLD: 8 %
NEUTROPHILS # BLD: 3.9 K/UL (ref 1.7–7.7)
NEUTROPHILS NFR BLD: 59.4 %
PHOSPHATE SERPL-MCNC: 3.6 MG/DL (ref 2.5–4.9)
PLATELET # BLD AUTO: 296 K/UL (ref 135–450)
PMV BLD AUTO: 6.9 FL (ref 5–10.5)
POTASSIUM SERPL-SCNC: 3.9 MMOL/L (ref 3.5–5.1)
PROT PATTERN UR ELPH-IMP: NORMAL
PROT SERPL-MCNC: 7.4 G/DL (ref 6.4–8.2)
RBC # BLD AUTO: 4.29 M/UL (ref 4.2–5.9)
SODIUM SERPL-SCNC: 139 MMOL/L (ref 136–145)
SPE/IFE INTERPRETATION: NORMAL
WBC # BLD AUTO: 6.5 K/UL (ref 4–11)

## 2024-11-15 PROCEDURE — 6370000000 HC RX 637 (ALT 250 FOR IP): Performed by: INTERNAL MEDICINE

## 2024-11-15 PROCEDURE — 36415 COLL VENOUS BLD VENIPUNCTURE: CPT

## 2024-11-15 PROCEDURE — 99233 SBSQ HOSP IP/OBS HIGH 50: CPT | Performed by: INTERNAL MEDICINE

## 2024-11-15 PROCEDURE — 96366 THER/PROPH/DIAG IV INF ADDON: CPT

## 2024-11-15 PROCEDURE — 80069 RENAL FUNCTION PANEL: CPT

## 2024-11-15 PROCEDURE — 6360000002 HC RX W HCPCS: Performed by: INTERNAL MEDICINE

## 2024-11-15 PROCEDURE — 2580000003 HC RX 258: Performed by: INTERNAL MEDICINE

## 2024-11-15 PROCEDURE — 83735 ASSAY OF MAGNESIUM: CPT

## 2024-11-15 PROCEDURE — 85025 COMPLETE CBC W/AUTO DIFF WBC: CPT

## 2024-11-15 PROCEDURE — 96372 THER/PROPH/DIAG INJ SC/IM: CPT

## 2024-11-15 PROCEDURE — 96376 TX/PRO/DX INJ SAME DRUG ADON: CPT

## 2024-11-15 PROCEDURE — G0378 HOSPITAL OBSERVATION PER HR: HCPCS

## 2024-11-15 RX ORDER — CIPROFLOXACIN 500 MG/1
500 TABLET, FILM COATED ORAL 2 TIMES DAILY
Qty: 20 TABLET | Refills: 0 | Status: SHIPPED | OUTPATIENT
Start: 2024-11-15 | End: 2024-11-25

## 2024-11-15 RX ORDER — SODIUM BICARBONATE 325 MG/1
1300 TABLET ORAL 2 TIMES DAILY
Qty: 240 TABLET | Refills: 0 | Status: SHIPPED | OUTPATIENT
Start: 2024-11-15 | End: 2024-12-15

## 2024-11-15 RX ADMIN — ENOXAPARIN SODIUM 40 MG: 100 INJECTION SUBCUTANEOUS at 08:51

## 2024-11-15 RX ADMIN — SODIUM BICARBONATE 1300 MG: 650 TABLET ORAL at 08:51

## 2024-11-15 RX ADMIN — FINASTERIDE 5 MG: 5 TABLET, FILM COATED ORAL at 08:51

## 2024-11-15 RX ADMIN — SODIUM CHLORIDE, PRESERVATIVE FREE 10 ML: 5 INJECTION INTRAVENOUS at 08:51

## 2024-11-15 RX ADMIN — CARVEDILOL 6.25 MG: 6.25 TABLET, FILM COATED ORAL at 08:51

## 2024-11-15 RX ADMIN — NIFEDIPINE 90 MG: 30 TABLET, FILM COATED, EXTENDED RELEASE ORAL at 08:51

## 2024-11-15 RX ADMIN — ASPIRIN 325 MG: 325 TABLET, COATED ORAL at 08:51

## 2024-11-15 RX ADMIN — OLANZAPINE 5 MG: 5 TABLET, FILM COATED ORAL at 08:51

## 2024-11-15 RX ADMIN — HYDRALAZINE HYDROCHLORIDE 5 MG: 20 INJECTION INTRAMUSCULAR; INTRAVENOUS at 08:50

## 2024-11-15 RX ADMIN — TAMSULOSIN HYDROCHLORIDE 0.4 MG: 0.4 CAPSULE ORAL at 08:51

## 2024-11-15 RX ADMIN — CEFEPIME 1000 MG: 1 INJECTION, POWDER, FOR SOLUTION INTRAMUSCULAR; INTRAVENOUS at 08:57

## 2024-11-15 RX ADMIN — Medication 1000 UNITS: at 08:51

## 2024-11-15 ASSESSMENT — ENCOUNTER SYMPTOMS
SINUS PRESSURE: 0
BACK PAIN: 0
DIARRHEA: 0
EYE REDNESS: 0
SORE THROAT: 0
SINUS PAIN: 0
SHORTNESS OF BREATH: 0
EYE DISCHARGE: 0
COUGH: 0
RHINORRHEA: 0
WHEEZING: 0
CONSTIPATION: 0
NAUSEA: 0
ABDOMINAL PAIN: 0

## 2024-11-15 ASSESSMENT — PAIN SCALES - GENERAL
PAINLEVEL_OUTOF10: 0

## 2024-11-15 NOTE — CARE COORDINATION
Case Management -  Discharge Note      Patient Name: Tye Resendez                   YOB: 1950            Readmission Risk (Low < 19, Mod (19-27), High > 27): Readmission Risk Score: 20.1    Current PCP: Chris Persaud MD      (IMM) Important Message from Medicare:    Has pt received appropriate IMM before discharge if required: N/A  Date: 11/15/2024    PT AM-PAC: 23 /24  OT AM-PAC: 24 /24    PPatient/patient representative has acknowledged the information provided and decided on the following discharge plan. Patient/ patient representative has been provided freedom of choice regarding service provider, supported by basic dialogue that supports the patient's individualized plan of care/goals.    Pt discharging to home with wife.  Daughter driving pt and wife home.      Financial    Payor: [employer] / Plan: [employer] / Product Type: *No Product type* /     Pharmacy:  Potential assistance Purchasing Medications: No  Meds-to-Beds request:        [facility] [id number] - [facility] [address] [phone] - F [phone]  [address] [place] [address]  Phone: [phone] Fax: [phone]    [facility] - [facility] [address] [phone] - F [phone]  [address] [place] [id number]  Phone: [phone] Fax: [phone]      Notes:    Additional Case Management Notes:  Pt was admitted form [facility].  Pt is independent with ADL's at home.  Pt discharging with PO ABX of ciprofloxacin 500 MG tablet & sodium bicarbonate 325 MG tablet.  No needs noted at discharge.      Arabella Nicholas, MSN, RN, Case Management  Office: [phone]  Electronically signed by Arabella Nicholas on 11/15/2024 at 2:53 PM

## 2024-11-15 NOTE — CARE COORDINATION
11/15/24 1446   Readmission Assessment   Number of Days since last admission? 1-7 days   Previous Disposition Other (comment)  ([facility])   Who is being Interviewed Patient  (Chart review)   What was the patient's/caregiver's perception as to why they think they needed to return back to the hospital? Other (Comment)  (Concern for Urinary retention)   Did you visit your Primary Care Physician after you left the hospital, before you returned this time? No   Why weren't you able to visit your PCP? Did not have an appointment   Did you see a specialist, such as Cardiac, Pulmonary, Orthopedic Physician, etc. after you left the hospital? No   Who advised the patient to return to the hospital? Other (Comment)  ([facility])   Does the patient report anything that got in the way of taking their medications? No   In our efforts to provide the best possible care to you and others like you, can you think of anything that we could have done to help you after you left the hospital the first time, so that you might not have needed to return so soon? Other (Comment)  (no)     Electronically signed by Rosmery Mckeon on 11/15/24 at 2:49 PM EST

## 2024-11-15 NOTE — PLAN OF CARE
Problem: Pain  Goal: Verbalizes/displays adequate comfort level or baseline comfort level  11/14/2024 2240 by Dionna Hogan LPN  Outcome: Progressing  11/14/2024 0844 by Onel Goodson RN  Outcome: Progressing     Problem: Safety - Adult  Goal: Free from fall injury  11/14/2024 2240 by Dionna Hogan LPN  Outcome: Progressing  11/14/2024 0844 by Onel Goodson RN  Outcome: Progressing

## 2024-11-15 NOTE — DISCHARGE SUMMARY
V2.0  Discharge Summary    Name:  Tye Resendez /Age/Sex: 1950 (74 y.o. male)   Admit Date: 2024  Discharge Date: 11/15/24    MRN & CSN:  [id number] & [id number] Encounter Date and Time 11/15/24 2:13 PM EST    Attending:  Sanjana Corley MD Discharging Provider: Sanjana Corley MD       Hospital Course:     Brief HPI: Tye Resendez is a 74 y.o. male with PMHx of hypertension, hyperlipidemia, bipolar disorder, BPH with chronic urinary retention with chronic Guerra catheter and CKD stage IV, admitted on (24-24) for UTI, hematuria and Moderate bilateral hydroureteral nephrosis;L>R ; discharged home on Ciprofloxacin, called back for further management of positive blood cultures.      Gram-negative bacteremia:  1/2 set of blood cultures obtained on 24 grew Pseudomonas.   Repeat blood cultures NGTD.  ID recommended PO Ciprofloxacin 500 mg BID for 10 days.      UTI: urine culture on 24 grew > 100,000 Pseudomonas.    Complete course of ciprofloxacin.     Moderate bilateral hydroureteral nephrosis; L>R  CT abdomen pelvis on 2024 showed marked thickening of urinary bladder walls with adjacent fat stranding suggestive of cystitis.  Moderate bilateral hydroureteronephrosis.    Guerra catheter was repositioned in the ED.    Out patient follow up with urology.     Hx of urinary retention 2/2 BPH:  Continue indwelling Guerra's catheter, Flomax and Proscar.     CKD stage IV with metabolic acidosis:  Creatinine 2.3 on discharge on 2024 (Baseline around 2.4-2.5).    Continue Na HCO3. Follow up with Nephrology out patient.     Hypertension: Resumed home medications on discharge.     Hx of renal mass:  No further workup recommended for the benign column of Cesar which is just normal renal parenchyma located in abnormal area.  Follow-up with urology.     s/p left tib/fib I&D and biopsy/excision (10/6/24).  Follow up with Dr. Lorenzo out patient.    The patient expressed appropriate  understanding of, and agreement with the discharge recommendations, medications, and plan.     Consults this admission:  IP CONSULT TO INFECTIOUS DISEASES  IP CONSULT TO NEPHROLOGY  IP CONSULT TO PALLIATIVE CARE    Discharge Diagnosis:   Bacteremia      Discharge Instruction:   Follow up appointments: PCP  Primary care physician: Chris Persaud MD within 1 week  Diet: regular diet   Activity: activity as tolerated  Disposition: Discharged to:   [x]Home, [][facility], []SNF, []Acute Rehab, []Hospice   Condition on discharge: Stable  Labs and Tests to be Followed up as an outpatient by PCP or Specialist: BMP    Discharge Medications:        Medication List        CONTINUE taking these medications      aspirin 325 MG EC tablet  Take 1 tablet by mouth in the morning and at bedtime     carvedilol 6.25 MG tablet  Commonly known as: COREG     ciprofloxacin 500 MG tablet  Commonly known as: CIPRO  Take 1 tablet by mouth 2 times daily for 10 days     finasteride 5 MG tablet  Commonly known as: PROSCAR  Take 1 tablet by mouth daily     melatonin 3 MG Tabs tablet     NIFEdipine 90 MG extended release tablet  Commonly known as: ADALAT CC     OLANZapine 10 MG tablet  Commonly known as: ZYPREXA     sodium bicarbonate 325 MG tablet  Take 4 tablets by mouth 2 times daily Take 4 tablets by mouth twice a day     tamsulosin 0.4 MG capsule  Commonly known as: FLOMAX  Take 1 capsule by mouth daily     vitamin D 25 MCG (1000 UT) Tabs tablet  Commonly known as: CHOLECALCIFEROL               Where to Get Your Medications        These medications were sent to [facility] [id number] [facility] [address] [facility] - P [phone] - F [phone]  [address] [place] [address]      Phone: [phone]   ciprofloxacin 500 MG tablet  sodium bicarbonate 325 MG tablet        Objective Findings at Discharge:   /69   Pulse 75   Temp 97.6 °F (36.4 °C) (Oral)   Resp 18   Ht 1.727 m (5' 7.99\")   Wt 90.9 kg (200 lb 6.4 oz)

## 2024-11-15 NOTE — FLOWSHEET NOTE
RN discharge summary from [address] [place] to home.     This patient has had a discharge order placed. They are returning home and being picked up in the lobby. Discharge paperwork has been printed, highlighted, and gone over with the patient by this RN. Patient understands teaching and has no further questions at this time. IV has been removed with no complications. Telemetry has been removed. Pt has all belongings present.

## 2024-11-15 NOTE — CONSULTS
PALLIATIVE MEDICINE CONSULTATION     Patient name:Tye Resendez   MRN:[id number]    :1950  Room/Bed:5TN-5565/5565-01   LOS: 0 days         Date of consult:11/15/2024    Inpatient consult to Palliative Care  Consult performed by: Yamini Smith APRN - CNP  Consult ordered by: Sanjana Corley MD  Reason for consult: GOC and code status              ASSESSMENT/RECOMMENDATIONS     74 y.o. male with AMS and hematuria       Symptom Management:  AMS- pt paranoid and hallucinating unsure if this is his baseline he has a history of bipolar and schizophrenia he repeatedly tells stories about a person named Jeanmarie Fernandez and all the ways he's sabotaged his life  Hematuria- UA not convincing for UA, urine is clearing up occurred per pt with a catheter exchange likely trauma related    Goals of Care- Pt with 3 admissions in 6 weeks, he is a poor historian with flight of ideas and paranoia. Left message on  for spouse Arabella pt states that [place] lives at [facility] with [place]. Attempted to call [facility] and was unable to get a person on the phone. With frequent admissions will send a palliative care referral to call pt and attempt to support him after DC     Patient/Family Goals of Care :    Pt with 3 admissions in 6 weeks, he is a poor historian with flight of ideas and paranoia. Left message on VM for spouse Arabella pt states that she lives at [facility] with him. Attempted to call [facility] and was unable to get a person on the phone. With frequent admissions will send a palliative care referral to call pt and attempt to support him after DC     Disposition/Discharge Plan:   An outpatient Palliative Care referral to [facility] was ordered for post-discharge to followup with the patient once they return home.    Advance Directives:    The patient has appointed the following active healthcare agents:  Spouse- Arabella    The Patient has the following current code status:    Code Status: Full Code      Interactive exchange    Musculoskeletal:      Cervical back: Neck supple.      Right lower leg: Edema present.      Left lower leg: Edema present.   Skin:     General: Skin is dry.      Coloration: Skin is pale.   Neurological:      Mental Status: He is alert and oriented to person, place, and time. Mental status is at baseline.   Psychiatric:         Attention and Perception: He perceives auditory hallucinations.         Mood and Affect: Mood normal.         Behavior: Behavior normal.         Thought Content: Thought content is paranoid and delusional.                OBJECTIVE   BP (!) 168/77   Pulse 81   Temp 97.2 °F (36.2 °C) (Oral)   Resp 18   Ht 1.727 m (5' 7.99\")   Wt 90.9 kg (200 lb 6.4 oz)   SpO2 96%   BMI 30.48 kg/m²   I/O last 3 completed shifts:  In: 1046.9 [P.O.:900; IV Piggyback:146.9]  Out: 4025 [Urine:4025]  I/O this shift:  In: 240 [P.O.:240]  Out: 550 [Urine:550]      Palliative Medicine Interventions:    patient/family support  Goals of Care discussions with patient/surrogate  Spiritual Interventions: none         DATA:  Current labs in the epic chart reviewed as of 11/15/2024   Review of previous notes, admits, labs, radiology and testing relevant to this consult done in this chart today 11/15/2024    Medical Decision Making:  The following items were considered in medical decision making:  Review of prior external note(s) from each unique source relevant to today's visit: Hospitalist, Case management  Discussion of management or test with external physician/qualified health care professional: Hospitalist, Case management  Unique test results reviewed: CBC and BMP       Risk of Complications/Morbidity: High   Illness(es)/ Infection present that pose threat to bodily function.   There is potential for severe exacerbation of condition/side effects of treatment.  Therapy requires intensive monitoring for toxicity        The patient and/or authorized decision maker consented to a voluntary Advance Care Planning

## 2024-11-15 NOTE — PROGRESS NOTES
Infectious Diseases   Progress Note      Admission Date: 11/13/2024  Hospital Day: Hospital Day: 3   Attending: Sanjana Corley MD  Date of service: 11/15/2024     Chief complaint/ Reason for consult:     Gram-negative bacteremia with Pseudomonas  Complicated Pseudomonas urinary tract infection  Status post Guerra's catheter exchange on November 8, 2024  Chronic indwelling Guerra's catheter    Microbiology:      I have reviewed allavailable micro lab data and cultures    Results       Procedure Component Value Units Date/Time    Culture, Blood 1 [[id number]] Collected: 11/13/24 1652    Order Status: Completed Specimen: Blood Updated: 11/14/24 1815     Blood Culture, Routine No Growth to date.  Any change in status will be called.    Narrative:      ORDER#: [id number]                          ORDERED BY: TRACY MERCER  SOURCE: Blood Hand, Left                   COLLECTED:  11/13/24 16:52  ANTIBIOTICS AT IGNACIO.:                      RECEIVED :  11/13/24 20:54  If child <=2 yrs old please draw pediatric bottle.~Blood Culture 1    Culture, Blood 2 [[id number]] Collected: 11/13/24 1652    Order Status: Completed Specimen: Blood Updated: 11/14/24 1815     Culture, Blood 2 No Growth to date.  Any change in status will be called.    Narrative:      ORDER#: [id number]                          ORDERED BY: TRACY MERCER  SOURCE: Blood Hand, Left                   COLLECTED:  11/13/24 16:52  ANTIBIOTICS AT IGNACIO.:                      RECEIVED :  11/13/24 20:54  If child <=2 yrs old please draw pediatric bottle.~Blood Culture #2    MRSA DNA Probe, Nasal [[id number]] Collected: 11/09/24 1256    Order Status: Completed Specimen: Nares Updated: 11/11/24 0250     MRSA SCREEN RT-PCR --     Negative  MRSA DNA not detected.  Normal Range: Not detected      Narrative:      ORDER#: [id number]                          ORDERED BY: TRACY MERCER  SOURCE: Nares                              COLLECTED:  11/09/24 12:56  ANTIBIOTICS AT  Urinary tract infection due to Pseudomonas aeruginosa N39.0, B96.5    Bacteremia R78.81    Class 1 obesity due to excess calories with body mass index (BMI) of 30.0 to 30.9 in adult E66.811, E66.09, Z68.30    Schizoaffective disorder (HCC) F25.9    Bipolar disorder (HCC) F31.9    Mixed hyperlipidemia E78.2    Essential hypertension I10    Bilateral hydronephrosis N13.30    Enlarged prostate N40.0    Indwelling catheter present on admission Z96.0       Please note that this chart was generated using Dragon dictation software. Although every effort was made to ensure the accuracy of this automated transcription, some errors in transcription may have occurred inadvertently. If you may need any clarification, please do not hesitate to contact me through EPIC or at the phone number provided below with my electronic signature.  Any pictures or media included in this note were obtained after taking informed verbal consent from the patient and with their approval to include those in the patient's medical record.        Peña Sapp MD, MPH, FACP, [facility]  11/15/2024, 12:51 PM  Central Office Phone: [phone]  Central Office Fax: [phone]    [facility] Infectious Disease   [address], [address] ([address])  [place] [address]  [place] [facility] days:  Tuesday & Thursday AM    [facility]ious Disease  [address] , [address] (Medical office Building)  [place], [place], [address]  [facility] days: Wednesday AM

## 2024-11-15 NOTE — PLAN OF CARE
Problem: Pain  Goal: Verbalizes/displays adequate comfort level or baseline comfort level  11/15/2024 1209 by Onel Goodson RN  Outcome: Progressing  11/14/2024 2240 by Dionna Hogan LPN  Outcome: Progressing     Problem: Safety - Adult  Goal: Free from fall injury  11/15/2024 1209 by Onel Goodson RN  Outcome: Progressing  11/14/2024 2240 by Dionna Hogan LPN  Outcome: Progressing

## 2024-11-15 NOTE — PROGRESS NOTES
MD Darell Vigil MD Aldo Estella, DO                 Office: [phone]                      Fax: [phone]             Payoff                   NEPHROLOGY INPATIENT PROGRESS NOTE:     PATIENT NAME: Tye Resendez  : 1950  MRN: [id number]       RECOMMENDATIONS:     Increased home sodium bicarb : 335 mg to 1300 mg , 3 times daily    Flomax, Proscar, Guerra for urology    PO Nifedipine, add PRN hydralazine for hypertension control    Antibiotics per primary and ID team,*      no need for dialysis,    at higher risk for decompensation, needing closer monitoring.      D/C plan from renal stand point:- per ID team   Has an appointment with us on 11-15, now rescheduled for 2024 with us    Thank you for allowing me to participate in this patient's care. Please do not hesitate to contact me anytime. We will follow along with you.       Darell Luis MD,  [facility]  Office: [phone] or Via Vobi  Fax: [phone]      =======================================================================================      IMPRESSION:       Admitted on:  2024  3:47 PM   For:    Hospital Problems             Last Modified POA    * (Principal) Bacteremia 2024 Yes    Urinary tract infection due to Pseudomonas aeruginosa 2024 Yes    Class 1 obesity due to excess calories with body mass index (BMI) of 30.0 to 30.9 in adult 2024 Yes    Schizoaffective disorder (HCC) 2024 Yes    Bipolar disorder (HCC) 2024 Yes    Mixed hyperlipidemia 2024 Yes    Essential hypertension 2024 Yes    Bilateral hydronephrosis 2024 Yes    Enlarged prostate 2024 Yes    Indwelling catheter present on admission 2024 Yes     Chronic kidney disease stage IV  GFR around high 20s-low 30s  Now Estimated Creatinine Clearance: 30 mL/min (A) (based on SCr of 2.3 mg/dL (H)).    Baseline creatinine about low to  Risk factors

## 2024-11-15 NOTE — PROGRESS NOTES
Shift assessment completed. Routine vitals completed. Scheduled medications given. Patient is awake, alert and oriented. Respirations are unlabored. Patient does not appear to be in distress. Pt currently in bed, awake and eyes opened. Call light within reach.

## 2024-11-16 LAB
KAPPA LC FREE SER-MCNC: 115 MG/L (ref 2.37–20.73)
KAPPA LC FREE/LAMBDA FREE SER: 1.61 {RATIO} (ref 0.22–1.74)
LAMBDA LC FREE SERPL-MCNC: 71.5 MG/L (ref 4.23–27.69)
RPT COMMENT: ABNORMAL

## 2024-11-17 LAB
BACTERIA BLD CULT ORG #2: NORMAL
BACTERIA BLD CULT: NORMAL

## 2024-11-19 ENCOUNTER — OFFICE VISIT (OUTPATIENT)
Dept: ORTHOPEDIC SURGERY | Age: 74
End: 2024-11-19

## 2024-11-19 VITALS — HEIGHT: 67 IN | WEIGHT: 200 LBS | BODY MASS INDEX: 31.39 KG/M2

## 2024-11-19 DIAGNOSIS — L02.416 ABSCESS OF LEFT LEG: Primary | ICD-10-CM

## 2024-11-19 PROCEDURE — 99024 POSTOP FOLLOW-UP VISIT: CPT | Performed by: NURSE PRACTITIONER

## 2024-11-19 NOTE — PROGRESS NOTES
DIAGNOSIS:  Left leg deep mass and abscess, s/p excision mass and  incision and drainage.    DATE OF SURGERY:  10/6/2024.    HISTORY OF PRESENT ILLNESS:  Mr. Resendez 74 y.o.  male who came in today for 6 weeks postoperative visit.  The patient denies any significant pain in the left leg. Rates pain a 0/10 VAS and doing better. He has been WBAT.  No numbness or tingling sensation. No fever or Chills. He grew up MSSA and on PO antibiotics. He has a h/o left knee fusion and chronic left foot drop since 1971. He does not use an AFO. He has wound care nurse change dressing at home every M,W,F.     PHYSICAL EXAMINATION:  The incision is open and deep.  No signs of any erythema or drainage, moderate swelling. He has no pain with the active or passive range of motion of the left knee, secondary to the knee fusion. Left foot drop.  He has intact sensation distally, and he is neurovascularly intact.    10/22/2024:      11/19/2024:      Surgical pathology consistent with reactive fibrous tissue with associated acute/chronic inflammation and dystrophic calcifications.       IMPRESSION:  6 weeks out from left left deep mass and abscess, s/p excision mass and  incision and drainage, and doing very well.    PLAN: He can go back to normal activity with no heavy impact activities. Continue dressing change , applied today and instructed in care. Continue wound care. Continue antibiotics until gone. The patient will come back for a follow up in 4 weeks for wound check.      The patient understands that there is a chance of abscess recurrence even after surgical I&D.        Letha Desir, APRN - CNP

## 2024-11-20 ENCOUNTER — TELEPHONE (OUTPATIENT)
Dept: ORTHOPEDIC SURGERY | Age: 74
End: 2024-11-20

## 2024-11-20 DIAGNOSIS — L02.416 ABSCESS OF LEFT LEG: Primary | ICD-10-CM

## 2024-11-20 NOTE — TELEPHONE ENCOUNTER
CLAUDY Buck. Per Letha quezada, they should do a wet to dry dressing for the wound.   Cielo stated that the dressing would be need to be changed daily and they cannot do that. She will try to get him into wound clinic.

## 2024-11-20 NOTE — TELEPHONE ENCOUNTER
Other ANJELICA WITH [facility] IS CALLING BACK AND ONLY RECEIVED THE PATIENTS FACE SHEET AND NO WOUND CARE ORDERS.

## 2024-11-20 NOTE — TELEPHONE ENCOUNTER
CLAUDY winkler. She stated that the patient's family will not drive him to wound care. She needs detailed orders for home health wound care.     Will call back detail instructions.

## 2024-11-20 NOTE — TELEPHONE ENCOUNTER
GEETHA WITH [facility] IS CALLING SAYS SHE NEED A SUGGESTED WOUND CARE ORDER FOR THE WOUND CLINIC.    PLEASE CALL GEETHA TO GO OVER  OPTIONS IF YOU HAVE ANY QUESTIONS [phone]

## 2024-11-20 NOTE — TELEPHONE ENCOUNTER
Other HOME CARE NURSE WITH [facility] IS CALLING REQUESTING NEW WOUND CARE ORDERS. [phone]

## 2024-11-21 NOTE — PATIENT INSTRUCTIONS
Care Agency/Facility:     Your wound-care supplies will be provided by: [facility]- [phone]  Please note, depending on your insurance coverage, you may have out-of-pocket expenses for these supplies. Someone from the company should call you to confirm your order and discuss those potential costs before they ship your products -- please anticipate that call. If your out-of-pocket cost could be substantial, Many companies have financial hardship programs for patients who qualify, so please ask about that if you might need a hand. If you have any questions about your supplies or your potential out-of-pocket costs, or if you need to place an order for a refill of supplies (typically monthly), please call the company directly.     Your  is Olivia    Follow up with Geo Guillen Np In 1 week(s) in the wound care center.       Wound Care Center Information: Should you experience any significant changes in your wound(s) or have questions about your wound care, please contact the [facility] at [phone] Monday  - Thursday 8:00 am - 4:00 pm and Friday 8:00 am - 1:00pm. If you need help with your wound outside these hours and cannot wait until we are again available, contact your PCP or go to the hospital emergency room.     PLEASE NOTE: IF YOU ARE UNABLE TO OBTAIN WOUND SUPPLIES, CONTINUE TO USE THE SUPPLIES YOU HAVE AVAILABLE UNTIL YOU ARE ABLE TO REACH US. IT IS MOST IMPORTANT TO KEEP THE WOUND COVERED AT ALL TIMES.    Patient Experience    Thank you for trusting us with your care.  You may receive a survey from a company called [employer] asking for your feedback.  We would appreciate it if you took a few minutes to share your experience.  Your input is very valuable to us.

## 2024-11-25 ENCOUNTER — TELEPHONE (OUTPATIENT)
Dept: ORTHOPEDIC SURGERY | Age: 74
End: 2024-11-25

## 2024-11-25 ENCOUNTER — HOSPITAL ENCOUNTER (OUTPATIENT)
Dept: WOUND CARE | Age: 74
Discharge: HOME | End: 2024-11-25
Payer: MEDICARE

## 2024-11-25 VITALS
SYSTOLIC BLOOD PRESSURE: 129 MMHG | HEART RATE: 87 BPM | RESPIRATION RATE: 17 BRPM | TEMPERATURE: 96.1 F | DIASTOLIC BLOOD PRESSURE: 84 MMHG

## 2024-11-25 DIAGNOSIS — S81.802A OPEN WOUND OF LOWER LEG, LEFT, INITIAL ENCOUNTER: ICD-10-CM

## 2024-11-25 DIAGNOSIS — T81.89XA WOUND, SURGICAL, NONHEALING, INITIAL ENCOUNTER: Primary | ICD-10-CM

## 2024-11-25 PROCEDURE — 11042 DBRDMT SUBQ TIS 1ST 20SQCM/<: CPT

## 2024-11-25 PROCEDURE — 99213 OFFICE O/P EST LOW 20 MIN: CPT

## 2024-11-25 RX ORDER — LIDOCAINE HYDROCHLORIDE 20 MG/ML
JELLY TOPICAL ONCE
OUTPATIENT
Start: 2024-11-25 | End: 2024-11-25

## 2024-11-25 RX ORDER — CLOBETASOL PROPIONATE 0.5 MG/G
OINTMENT TOPICAL ONCE
OUTPATIENT
Start: 2024-11-25 | End: 2024-11-25

## 2024-11-25 RX ORDER — GENTAMICIN SULFATE 1 MG/G
OINTMENT TOPICAL ONCE
OUTPATIENT
Start: 2024-11-25 | End: 2024-11-25

## 2024-11-25 RX ORDER — MUPIROCIN 20 MG/G
OINTMENT TOPICAL ONCE
OUTPATIENT
Start: 2024-11-25 | End: 2024-11-25

## 2024-11-25 RX ORDER — TRIAMCINOLONE ACETONIDE 1 MG/G
OINTMENT TOPICAL ONCE
OUTPATIENT
Start: 2024-11-25 | End: 2024-11-25

## 2024-11-25 RX ORDER — BETAMETHASONE DIPROPIONATE 0.5 MG/G
CREAM TOPICAL ONCE
OUTPATIENT
Start: 2024-11-25 | End: 2024-11-25

## 2024-11-25 RX ORDER — NEOMYCIN/BACITRACIN/POLYMYXINB 3.5-400-5K
OINTMENT (GRAM) TOPICAL ONCE
OUTPATIENT
Start: 2024-11-25 | End: 2024-11-25

## 2024-11-25 RX ORDER — LIDOCAINE 40 MG/G
CREAM TOPICAL ONCE
Status: DISCONTINUED | OUTPATIENT
Start: 2024-11-25 | End: 2024-11-26 | Stop reason: HOSPADM

## 2024-11-25 RX ORDER — SODIUM CHLOR/HYPOCHLOROUS ACID 0.033 %
SOLUTION, IRRIGATION IRRIGATION ONCE
OUTPATIENT
Start: 2024-11-25 | End: 2024-11-25

## 2024-11-25 RX ORDER — LIDOCAINE HYDROCHLORIDE 40 MG/ML
SOLUTION TOPICAL ONCE
OUTPATIENT
Start: 2024-11-25 | End: 2024-11-25

## 2024-11-25 RX ORDER — BACITRACIN ZINC AND POLYMYXIN B SULFATE 500; 1000 [USP'U]/G; [USP'U]/G
OINTMENT TOPICAL ONCE
OUTPATIENT
Start: 2024-11-25 | End: 2024-11-25

## 2024-11-25 RX ORDER — GINSENG 100 MG
CAPSULE ORAL ONCE
OUTPATIENT
Start: 2024-11-25 | End: 2024-11-25

## 2024-11-25 RX ORDER — LIDOCAINE 40 MG/G
CREAM TOPICAL ONCE
OUTPATIENT
Start: 2024-11-25 | End: 2024-11-25

## 2024-11-25 RX ORDER — LIDOCAINE 50 MG/G
OINTMENT TOPICAL ONCE
OUTPATIENT
Start: 2024-11-25 | End: 2024-11-25

## 2024-11-25 RX ORDER — SILVER SULFADIAZINE 10 MG/G
CREAM TOPICAL ONCE
OUTPATIENT
Start: 2024-11-25 | End: 2024-11-25

## 2024-11-25 NOTE — TELEPHONE ENCOUNTER
General Question     Subject: FOLLOW UP APPT  Patient and /or Facility Request: Tye Resendez   Contact Number: [phone]      PATIENT WIFE JORDON CALLED AND STATED THE WOUND CAREGIVER INFORMED HER AND THE PATIENT THE PATIENT SHOULD FOLLOW UP WITH      SHE IS WANTING TO CONFIRM THIS IS CORRECT     PLEASE CALL BACK THE ABOVE NUMBER

## 2024-11-25 NOTE — TELEPHONE ENCOUNTER
Returned the phone call. The number supplied was to the patient's assisted living facility. I spoke with the patient's nurse. Advised him the the patient should follow up but already has an apt scheduled on 12/17/24 at  at 8:15 AM.     The nurse stated he would let the patient and his  wife know that yes he should follow up and the apt is already scheduled.

## 2024-11-25 NOTE — PROGRESS NOTES
on File Prior to Encounter   Medication Sig Dispense Refill    ciprofloxacin (CIPRO) 500 MG tablet Take 1 tablet by mouth 2 times daily for 10 days 20 tablet 0    sodium bicarbonate 325 MG tablet Take 4 tablets by mouth 2 times daily Take 4 tablets by mouth twice a day 240 tablet 0    aspirin 325 MG EC tablet Take 1 tablet by mouth in the morning and at bedtime 30 tablet 3    finasteride (PROSCAR) 5 MG tablet Take 1 tablet by mouth daily 30 tablet 3    tamsulosin (FLOMAX) 0.4 MG capsule Take 1 capsule by mouth daily 30 capsule 3    carvedilol (COREG) 6.25 MG tablet Take 1 tablet by mouth 2 times daily (with meals)      NIFEdipine (ADALAT CC) 90 MG extended release tablet Take 1 tablet by mouth daily Take one tablet by mouth once a day on an empty stomach.      OLANZapine (ZYPREXA) 10 MG tablet Take 0.5-1 tablets by mouth See Admin Instructions Take 1/2 tablet by mouth in the morning and 1 whole tablet at night.      vitamin D (CHOLECALCIFEROL) 25 MCG (1000 UT) TABS tablet Take 1 tablet by mouth daily Indications: Hyperparathyroidism      melatonin 3 MG TABS tablet Take 1 tablet by mouth daily       No current facility-administered medications on file prior to encounter.       REVIEW OF SYSTEMS    Pertinent items are noted in HPI.      Objective:      /84   Pulse 87   Temp (!) 96.1 °F (35.6 °C) (Tympanic)   Resp 17     PHYSICAL EXAM    General Appearance: alert and oriented to person, place and time, well-developed and well-nourished, in no acute distress and obese  Skin: warm and dry, no rash or erythema and left lower extremity open wound with DRY exposed yellow tendon  Head: normocephalic and atraumatic  Eyes: pupils equal, round, and reactive to light  Pulmonary/Chest: normal air movement, no respiratory distress  Cardiovascular: normal rate, regular rhythm, and no acute distress  Extremities: no cyanosis, no clubbing, and intact left distal pulse  Musculoskeletal: normal range of motion, no joint 
Wednesday and Friday using clean technique. Patient/Family/caregiver may change dressings as needed as instructed when Home Care unavailable.    WOUNDS REQUIRING DRESSING Changes:     Wound 11/25/24 Leg Left;Anterior #1 (Active)   Wound Image   11/25/24 1017   Wound Etiology Other 11/25/24 1017   Wound Cleansed Cleansed with saline 11/25/24 1017   Wound Length (cm) 2.3 cm 11/25/24 1017   Wound Width (cm) 1.3 cm 11/25/24 1017   Wound Depth (cm) 0.2 cm 11/25/24 1017   Wound Surface Area (cm^2) 2.99 cm^2 11/25/24 1017   Wound Volume (cm^3) 0.598 cm^3 11/25/24 1017   Post-Procedure Length (cm) 2.3 cm 11/25/24 1044   Post-Procedure Width (cm) 1.3 cm 11/25/24 1044   Post-Procedure Depth (cm) 0.3 cm 11/25/24 1044   Post-Procedure Surface Area (cm^2) 2.99 cm^2 11/25/24 1044   Post-Procedure Volume (cm^3) 0.897 cm^3 11/25/24 1044   Wound Assessment Bleeding 11/25/24 1044   Drainage Amount Moderate (25-50%) 11/25/24 1044   Drainage Description Serosanguinous 11/25/24 1044   Odor None 11/25/24 1017   Gilda-wound Assessment Edematous 11/25/24 1017   Margins Attached edges 11/25/24 1017   Number of days: 0          Patient seen and treated on 11/25/2024    By Brianna Guillen NP  [id number]   (provider/NPI)

## 2024-11-26 ENCOUNTER — HOSPITAL ENCOUNTER (OUTPATIENT)
Age: 74
Discharge: HOME | End: 2024-11-26
Payer: MEDICARE

## 2024-11-26 ENCOUNTER — TELEPHONE (OUTPATIENT)
Dept: ORTHOPEDIC SURGERY | Age: 74
End: 2024-11-26

## 2024-11-26 DIAGNOSIS — N18.9 CHRONIC KIDNEY DISEASE (CKD), ACTIVE MEDICAL MANAGEMENT WITHOUT DIALYSIS, UNSPECIFIED STAGE: ICD-10-CM

## 2024-11-26 LAB
ALBUMIN SERPL-MCNC: 3.9 G/DL (ref 3.4–5)
ANION GAP SERPL CALCULATED.3IONS-SCNC: 13 MMOL/L (ref 3–16)
BUN SERPL-MCNC: 43 MG/DL (ref 7–20)
CALCIUM SERPL-MCNC: 9.3 MG/DL (ref 8.3–10.6)
CHLORIDE SERPL-SCNC: 109 MMOL/L (ref 99–110)
CO2 SERPL-SCNC: 17 MMOL/L (ref 21–32)
CREAT SERPL-MCNC: 2.6 MG/DL (ref 0.8–1.3)
CREAT UR-MCNC: 102 MG/DL (ref 39–259)
DEPRECATED RDW RBC AUTO: 17.4 % (ref 12.4–15.4)
GFR SERPLBLD CREATININE-BSD FMLA CKD-EPI: 25 ML/MIN/{1.73_M2}
GLUCOSE SERPL-MCNC: 116 MG/DL (ref 70–99)
HCT VFR BLD AUTO: 40.4 % (ref 40.5–52.5)
HGB BLD-MCNC: 13.5 G/DL (ref 13.5–17.5)
MCH RBC QN AUTO: 29.7 PG (ref 26–34)
MCHC RBC AUTO-ENTMCNC: 33.3 G/DL (ref 31–36)
MCV RBC AUTO: 89.1 FL (ref 80–100)
MICROALBUMIN UR DL<=1MG/L-MCNC: 3.66 MG/DL
MICROALBUMIN/CREAT UR: 35.9 MG/G (ref 0–30)
PHOSPHATE SERPL-MCNC: 3.7 MG/DL (ref 2.5–4.9)
PLATELET # BLD AUTO: 339 K/UL (ref 135–450)
PMV BLD AUTO: 7.6 FL (ref 5–10.5)
POTASSIUM SERPL-SCNC: 4.5 MMOL/L (ref 3.5–5.1)
PROT UR-MCNC: 20.3 MG/DL
PROT/CREAT UR-RTO: 0.2 MG/DL
RBC # BLD AUTO: 4.54 M/UL (ref 4.2–5.9)
SODIUM SERPL-SCNC: 139 MMOL/L (ref 136–145)
WBC # BLD AUTO: 6.3 K/UL (ref 4–11)

## 2024-11-26 PROCEDURE — 82043 UR ALBUMIN QUANTITATIVE: CPT

## 2024-11-26 PROCEDURE — 36415 COLL VENOUS BLD VENIPUNCTURE: CPT

## 2024-11-26 PROCEDURE — 85027 COMPLETE CBC AUTOMATED: CPT

## 2024-11-26 PROCEDURE — 82570 ASSAY OF URINE CREATININE: CPT

## 2024-11-26 PROCEDURE — 80069 RENAL FUNCTION PANEL: CPT

## 2024-11-26 PROCEDURE — 84156 ASSAY OF PROTEIN URINE: CPT

## 2024-11-26 NOTE — TELEPHONE ENCOUNTER
General Question     Subject: LT LEG WOUND CARE UPDATE   Patient and /or Facility Request: Tye Resendez   Contact Number: [phone]      GEETHA FROM PHYSICIAN  [facility] CALLED IN TO LET THE OFFICE KNOW ABOUT THE PATIENT WOUND CARE APPT YESTERDAY. PATIENT HAS AN  TENDON IS EXPOSE ON THE LEFT LEG...    IF THE PATIENT PLEASE REACH OUT TO GEETHA...    PLEASE  ADVISE

## 2024-11-26 NOTE — TELEPHONE ENCOUNTER
CLAUDY Buck.   She stated that the patient went to the wound care clinic on 11/25/24. They performed a procedure while he was there. His tendon is now exposed. She stated that wound care suggested the patient follow up with Dr. Lorenzo.   Apt was offered to 11/27 at [facility] at 3:30 PM or 8:15 AM.     Cielo scheduled him for 3:30 PM at [facility] and will call back if that time does not work.

## 2024-11-27 ENCOUNTER — TELEPHONE (OUTPATIENT)
Dept: ORTHOPEDIC SURGERY | Age: 74
End: 2024-11-27

## 2024-11-27 NOTE — TELEPHONE ENCOUNTER
SW patient's wife. Advised her that Dr. Lorenzo has to leave clinic early for an emergency surgery. Requested that we move patient's apt to an earlier time slot today. Patient's wife will call back to confirm if they can come sooner.    Upon return call, OK to move patient to any time slot before 2:00 PM. OK to overbook in any slot.

## 2024-11-27 NOTE — TELEPHONE ENCOUNTER
Patient has a FU apt scheduled for 11/27/24.   Please fax OVN to [facility] [phone].

## 2024-12-04 ENCOUNTER — OFFICE VISIT (OUTPATIENT)
Dept: ORTHOPEDIC SURGERY | Age: 74
End: 2024-12-04

## 2024-12-04 VITALS — HEIGHT: 67 IN | WEIGHT: 200 LBS | BODY MASS INDEX: 31.39 KG/M2

## 2024-12-04 DIAGNOSIS — L02.416 ABSCESS OF LEFT LEG: Primary | ICD-10-CM

## 2024-12-04 PROCEDURE — 99024 POSTOP FOLLOW-UP VISIT: CPT | Performed by: NURSE PRACTITIONER

## 2024-12-05 NOTE — PROGRESS NOTES
DIAGNOSIS:  Left leg deep mass and abscess, s/p excision mass and  incision and drainage.    DATE OF SURGERY:  10/6/2024.    HISTORY OF PRESENT ILLNESS:  Mr. Resendez 74 y.o.  male who came in today for 8 weeks postoperative visit.  The patient denies any significant pain in the left leg. Rates pain a 0/10 VAS and doing better. He has been WBAT.  No numbness or tingling sensation. No fever or Chills. He grew up MSSA and on antibiotics. He has a h/o left knee fusion and chronic left foot drop since 1971. He does not use an AFO. He has wound care nurse change dressing at home every M,W,F.     PHYSICAL EXAMINATION:  The incision with dried scab.  No signs of any erythema or drainage, mild swelling. He has no pain with the active or passive range of motion of the left knee, secondary to the knee fusion. Left foot drop.  He has intact sensation distally, and he is neurovascularly intact.    10/22/2024:      11/19/2024:        12/4/2024:      Surgical pathology consistent with reactive fibrous tissue with associated acute/chronic inflammation and dystrophic calcifications.       IMPRESSION:  8 weeks out from left left deep mass and abscess, s/p excision mass and  incision and drainage, and doing very well.    PLAN: He can go back to normal activity with no heavy impact activities. Continue dressing change , applied today and instructed in care. Continue wound care. Continue antibiotics until gone. The patient will come back for a follow up in 4 weeks for wound check.      The patient understands that there is a chance of abscess recurrence even after surgical I&D.        Letha Desir, JOSE - CNP

## 2024-12-09 ENCOUNTER — HOSPITAL ENCOUNTER (INPATIENT)
Age: 74
LOS: 3 days | Discharge: HOME | DRG: 699 | End: 2024-12-12
Attending: STUDENT IN AN ORGANIZED HEALTH CARE EDUCATION/TRAINING PROGRAM | Admitting: STUDENT IN AN ORGANIZED HEALTH CARE EDUCATION/TRAINING PROGRAM
Payer: OTHER GOVERNMENT

## 2024-12-09 ENCOUNTER — APPOINTMENT (OUTPATIENT)
Dept: CT IMAGING | Age: 74
DRG: 699 | End: 2024-12-09
Payer: OTHER GOVERNMENT

## 2024-12-09 DIAGNOSIS — R33.8 ACUTE URINARY RETENTION: ICD-10-CM

## 2024-12-09 DIAGNOSIS — T83.511A URINARY TRACT INFECTION ASSOCIATED WITH INDWELLING URETHRAL CATHETER, INITIAL ENCOUNTER (HCC): Primary | ICD-10-CM

## 2024-12-09 DIAGNOSIS — N39.0 URINARY TRACT INFECTION ASSOCIATED WITH INDWELLING URETHRAL CATHETER, INITIAL ENCOUNTER (HCC): Primary | ICD-10-CM

## 2024-12-09 DIAGNOSIS — R31.0 GROSS HEMATURIA: ICD-10-CM

## 2024-12-09 DIAGNOSIS — N18.9 CHRONIC KIDNEY DISEASE, UNSPECIFIED CKD STAGE: ICD-10-CM

## 2024-12-09 LAB
ALBUMIN SERPL-MCNC: 4.4 G/DL (ref 3.4–5)
ALBUMIN/GLOB SERPL: 1.2 {RATIO} (ref 1.1–2.2)
ALP SERPL-CCNC: 118 U/L (ref 40–129)
ALT SERPL-CCNC: 24 U/L (ref 10–40)
ANION GAP SERPL CALCULATED.3IONS-SCNC: 15 MMOL/L (ref 3–16)
AST SERPL-CCNC: 24 U/L (ref 15–37)
BASOPHILS # BLD: 0.1 K/UL (ref 0–0.2)
BASOPHILS NFR BLD: 0.7 %
BILIRUB SERPL-MCNC: 0.5 MG/DL (ref 0–1)
BILIRUB UR QL STRIP.AUTO: ABNORMAL
BUN SERPL-MCNC: 37 MG/DL (ref 7–20)
CALCIUM SERPL-MCNC: 9.8 MG/DL (ref 8.3–10.6)
CHLORIDE SERPL-SCNC: 103 MMOL/L (ref 99–110)
CLARITY UR: ABNORMAL
CO2 SERPL-SCNC: 22 MMOL/L (ref 21–32)
COLOR UR: ABNORMAL
CREAT SERPL-MCNC: 2.5 MG/DL (ref 0.8–1.3)
DEPRECATED RDW RBC AUTO: 16.3 % (ref 12.4–15.4)
EOSINOPHIL # BLD: 0.6 K/UL (ref 0–0.6)
EOSINOPHIL NFR BLD: 4.9 %
GFR SERPLBLD CREATININE-BSD FMLA CKD-EPI: 26 ML/MIN/{1.73_M2}
GLUCOSE SERPL-MCNC: 117 MG/DL (ref 70–99)
GLUCOSE UR STRIP.AUTO-MCNC: ABNORMAL MG/DL
HCT VFR BLD AUTO: 43.7 % (ref 40.5–52.5)
HGB BLD-MCNC: 14.6 G/DL (ref 13.5–17.5)
HGB UR QL STRIP.AUTO: ABNORMAL
KETONES UR STRIP.AUTO-MCNC: ABNORMAL MG/DL
LEUKOCYTE ESTERASE UR QL STRIP.AUTO: ABNORMAL
LIPASE SERPL-CCNC: 45 U/L (ref 13–60)
LYMPHOCYTES # BLD: 2.2 K/UL (ref 1–5.1)
LYMPHOCYTES NFR BLD: 16.7 %
MCH RBC QN AUTO: 29.5 PG (ref 26–34)
MCHC RBC AUTO-ENTMCNC: 33.4 G/DL (ref 31–36)
MCV RBC AUTO: 88.3 FL (ref 80–100)
MONOCYTES # BLD: 1 K/UL (ref 0–1.3)
MONOCYTES NFR BLD: 7.6 %
NEUTROPHILS # BLD: 9.2 K/UL (ref 1.7–7.7)
NEUTROPHILS NFR BLD: 70.1 %
NITRITE UR QL STRIP.AUTO: ABNORMAL
PH UR STRIP.AUTO: ABNORMAL [PH] (ref 5–8)
PLATELET # BLD AUTO: 269 K/UL (ref 135–450)
PMV BLD AUTO: 7 FL (ref 5–10.5)
POTASSIUM SERPL-SCNC: 5 MMOL/L (ref 3.5–5.1)
PROT SERPL-MCNC: 8.2 G/DL (ref 6.4–8.2)
PROT UR STRIP.AUTO-MCNC: ABNORMAL MG/DL
RBC # BLD AUTO: 4.94 M/UL (ref 4.2–5.9)
RBC #/AREA URNS HPF: >100 /HPF (ref 0–4)
REASON FOR REJECTION: NORMAL
REJECTED TEST: NORMAL
SODIUM SERPL-SCNC: 140 MMOL/L (ref 136–145)
SP GR UR STRIP.AUTO: ABNORMAL (ref 1–1.03)
UA COMPLETE W REFLEX CULTURE PNL UR: YES
UA DIPSTICK W REFLEX MICRO PNL UR: YES
URN SPEC COLLECT METH UR: ABNORMAL
UROBILINOGEN UR STRIP-ACNC: ABNORMAL E.U./DL
WBC # BLD AUTO: 13.1 K/UL (ref 4–11)
WBC #/AREA URNS HPF: ABNORMAL /HPF (ref 0–5)

## 2024-12-09 PROCEDURE — 74176 CT ABD & PELVIS W/O CONTRAST: CPT

## 2024-12-09 PROCEDURE — 96365 THER/PROPH/DIAG IV INF INIT: CPT

## 2024-12-09 PROCEDURE — 83690 ASSAY OF LIPASE: CPT

## 2024-12-09 PROCEDURE — 96374 THER/PROPH/DIAG INJ IV PUSH: CPT

## 2024-12-09 PROCEDURE — 81001 URINALYSIS AUTO W/SCOPE: CPT

## 2024-12-09 PROCEDURE — 99285 EMERGENCY DEPT VISIT HI MDM: CPT

## 2024-12-09 PROCEDURE — 1200000000 HC SEMI PRIVATE

## 2024-12-09 PROCEDURE — 80053 COMPREHEN METABOLIC PANEL: CPT

## 2024-12-09 PROCEDURE — 87086 URINE CULTURE/COLONY COUNT: CPT

## 2024-12-09 PROCEDURE — 85025 COMPLETE CBC W/AUTO DIFF WBC: CPT

## 2024-12-09 PROCEDURE — 36415 COLL VENOUS BLD VENIPUNCTURE: CPT

## 2024-12-09 PROCEDURE — 6360000002 HC RX W HCPCS: Performed by: PHYSICIAN ASSISTANT

## 2024-12-09 RX ORDER — ONDANSETRON 2 MG/ML
4 INJECTION INTRAMUSCULAR; INTRAVENOUS ONCE
Status: COMPLETED | OUTPATIENT
Start: 2024-12-09 | End: 2024-12-09

## 2024-12-09 RX ORDER — FENTANYL CITRATE 50 UG/ML
25 INJECTION, SOLUTION INTRAMUSCULAR; INTRAVENOUS ONCE
Status: DISCONTINUED | OUTPATIENT
Start: 2024-12-09 | End: 2024-12-10

## 2024-12-09 RX ORDER — OLANZAPINE 5 MG/1
5 TABLET ORAL EVERY 8 HOURS PRN
COMMUNITY

## 2024-12-09 RX ADMIN — PIPERACILLIN SODIUM AND TAZOBACTAM SODIUM 3375 MG: 3; .375 INJECTION, SOLUTION INTRAVENOUS at 21:30

## 2024-12-09 RX ADMIN — ONDANSETRON 4 MG: 2 INJECTION, SOLUTION INTRAMUSCULAR; INTRAVENOUS at 17:14

## 2024-12-09 ASSESSMENT — ENCOUNTER SYMPTOMS
NAUSEA: 1
VOMITING: 1
SHORTNESS OF BREATH: 0
ABDOMINAL PAIN: 1
CHEST TIGHTNESS: 0
DIARRHEA: 0

## 2024-12-09 ASSESSMENT — PAIN SCALES - GENERAL: PAINLEVEL_OUTOF10: 10

## 2024-12-09 ASSESSMENT — PAIN DESCRIPTION - DESCRIPTORS: DESCRIPTORS: DISCOMFORT

## 2024-12-09 ASSESSMENT — PAIN DESCRIPTION - LOCATION: LOCATION: ABDOMEN

## 2024-12-09 ASSESSMENT — PAIN - FUNCTIONAL ASSESSMENT: PAIN_FUNCTIONAL_ASSESSMENT: 0-10

## 2024-12-09 NOTE — ED NOTES
Patient is talking about a \"criminal element that is invading his assisted nursing facility and he thinbks people are putting things up his anus\".

## 2024-12-09 NOTE — ED PROVIDER NOTES
[facility] EMERGENCY DEPARTMENT  EMERGENCY DEPARTMENT ENCOUNTER        Pt Name: Tye Resendez  MRN: [id number]  Birthdate 1950  Date of evaluation: 12/9/2024  Provider: Brendan Guadarrama PA-C  PCP: Chris Persaud MD  Note Started: 5:36 PM EST 12/9/24      CARLOS EDUARDO. I have evaluated this patient.        CHIEF COMPLAINT       Chief Complaint   Patient presents with    Abdominal Pain     Guerra cath has blood in tube. Has Abdominal pain all day since cath change with home health nurse.        HISTORY OF PRESENT ILLNESS: 1 or more Elements     History from : Patient    Limitations to history : None    Tye Resendez is a 74 y.o. male with a history of hypertension, hyperlipidemia, enlarged prostate, chronic indwelling Guerra catheter and schizophrenia with schizoaffective disorder who presents to the emergency department today stating he lives at [facility] and the health nurse came in today to replace his Guerra catheter.  Patient states after replacing his Guerra catheter he began having a bloody drainage with blood clots into the Guerra tubing.  He states he has not urinated since that time and is now having suprapubic abdominal pain.  He has had nausea with vomiting here in the emergency department.  He denies fever or chills and is nonfebrile here        Nursing Notes were all reviewed and agreed with or any disagreements were addressed in the HPI.    REVIEW OF SYSTEMS :      Review of Systems   Constitutional:  Negative for chills and fever.   Respiratory:  Negative for chest tightness and shortness of breath.    Cardiovascular:  Negative for chest pain.   Gastrointestinal:  Positive for abdominal pain, nausea and vomiting. Negative for diarrhea.   Genitourinary:  Positive for difficulty urinating and hematuria. Negative for dysuria, flank pain, frequency, penile discharge, penile pain, penile swelling, scrotal swelling, testicular pain and urgency.   Neurological:  Negative for

## 2024-12-10 LAB
ANION GAP SERPL CALCULATED.3IONS-SCNC: 12 MMOL/L (ref 3–16)
BACTERIA UR CULT: NORMAL
BASOPHILS # BLD: 0.1 K/UL (ref 0–0.2)
BASOPHILS NFR BLD: 0.9 %
BUN SERPL-MCNC: 36 MG/DL (ref 7–20)
CALCIUM SERPL-MCNC: 8.6 MG/DL (ref 8.3–10.6)
CHLORIDE SERPL-SCNC: 107 MMOL/L (ref 99–110)
CO2 SERPL-SCNC: 20 MMOL/L (ref 21–32)
CREAT SERPL-MCNC: 2.4 MG/DL (ref 0.8–1.3)
DEPRECATED RDW RBC AUTO: 16.5 % (ref 12.4–15.4)
EOSINOPHIL # BLD: 0.1 K/UL (ref 0–0.6)
EOSINOPHIL NFR BLD: 1.3 %
GFR SERPLBLD CREATININE-BSD FMLA CKD-EPI: 27 ML/MIN/{1.73_M2}
GLUCOSE SERPL-MCNC: 110 MG/DL (ref 70–99)
HCT VFR BLD AUTO: 38.1 % (ref 40.5–52.5)
HGB BLD-MCNC: 12.9 G/DL (ref 13.5–17.5)
LYMPHOCYTES # BLD: 1.7 K/UL (ref 1–5.1)
LYMPHOCYTES NFR BLD: 21.2 %
MCH RBC QN AUTO: 29.7 PG (ref 26–34)
MCHC RBC AUTO-ENTMCNC: 33.9 G/DL (ref 31–36)
MCV RBC AUTO: 87.8 FL (ref 80–100)
MONOCYTES # BLD: 0.9 K/UL (ref 0–1.3)
MONOCYTES NFR BLD: 11.5 %
NEUTROPHILS # BLD: 5.2 K/UL (ref 1.7–7.7)
NEUTROPHILS NFR BLD: 65.1 %
PLATELET # BLD AUTO: 252 K/UL (ref 135–450)
PMV BLD AUTO: 7.2 FL (ref 5–10.5)
POTASSIUM SERPL-SCNC: 4.3 MMOL/L (ref 3.5–5.1)
RBC # BLD AUTO: 4.34 M/UL (ref 4.2–5.9)
SODIUM SERPL-SCNC: 139 MMOL/L (ref 136–145)
WBC # BLD AUTO: 8 K/UL (ref 4–11)

## 2024-12-10 PROCEDURE — 6370000000 HC RX 637 (ALT 250 FOR IP): Performed by: PHYSICIAN ASSISTANT

## 2024-12-10 PROCEDURE — 80048 BASIC METABOLIC PNL TOTAL CA: CPT

## 2024-12-10 PROCEDURE — 85025 COMPLETE CBC W/AUTO DIFF WBC: CPT

## 2024-12-10 PROCEDURE — 2580000003 HC RX 258: Performed by: PHYSICIAN ASSISTANT

## 2024-12-10 PROCEDURE — 1200000000 HC SEMI PRIVATE

## 2024-12-10 PROCEDURE — 0TCB7ZZ EXTIRPATION OF MATTER FROM BLADDER, VIA NATURAL OR ARTIFICIAL OPENING: ICD-10-PCS | Performed by: INTERNAL MEDICINE

## 2024-12-10 RX ORDER — TAMSULOSIN HYDROCHLORIDE 0.4 MG/1
0.4 CAPSULE ORAL EVERY EVENING
Status: DISCONTINUED | OUTPATIENT
Start: 2024-12-10 | End: 2024-12-12 | Stop reason: HOSPADM

## 2024-12-10 RX ORDER — OLANZAPINE 5 MG/1
5 TABLET ORAL DAILY
Status: DISCONTINUED | OUTPATIENT
Start: 2024-12-10 | End: 2024-12-12 | Stop reason: HOSPADM

## 2024-12-10 RX ORDER — CARVEDILOL 6.25 MG/1
6.25 TABLET ORAL 2 TIMES DAILY WITH MEALS
Status: DISCONTINUED | OUTPATIENT
Start: 2024-12-10 | End: 2024-12-12 | Stop reason: HOSPADM

## 2024-12-10 RX ORDER — SODIUM BICARBONATE 650 MG/1
1300 TABLET ORAL 2 TIMES DAILY
Status: DISCONTINUED | OUTPATIENT
Start: 2024-12-10 | End: 2024-12-11

## 2024-12-10 RX ORDER — SODIUM CHLORIDE 0.9 % (FLUSH) 0.9 %
5-40 SYRINGE (ML) INJECTION PRN
Status: DISCONTINUED | OUTPATIENT
Start: 2024-12-10 | End: 2024-12-12 | Stop reason: HOSPADM

## 2024-12-10 RX ORDER — OLANZAPINE 5 MG/1
10 TABLET ORAL NIGHTLY
Status: DISCONTINUED | OUTPATIENT
Start: 2024-12-10 | End: 2024-12-12 | Stop reason: HOSPADM

## 2024-12-10 RX ORDER — NIFEDIPINE 30 MG
90 TABLET, EXTENDED RELEASE ORAL DAILY
Status: DISCONTINUED | OUTPATIENT
Start: 2024-12-10 | End: 2024-12-12 | Stop reason: HOSPADM

## 2024-12-10 RX ORDER — SODIUM CHLORIDE 0.9 % (FLUSH) 0.9 %
5-40 SYRINGE (ML) INJECTION EVERY 12 HOURS SCHEDULED
Status: DISCONTINUED | OUTPATIENT
Start: 2024-12-10 | End: 2024-12-12 | Stop reason: HOSPADM

## 2024-12-10 RX ORDER — SODIUM CHLORIDE 9 MG/ML
INJECTION, SOLUTION INTRAVENOUS CONTINUOUS
Status: ACTIVE | OUTPATIENT
Start: 2024-12-10 | End: 2024-12-10

## 2024-12-10 RX ORDER — ACETAMINOPHEN 650 MG/1
650 SUPPOSITORY RECTAL EVERY 6 HOURS PRN
Status: DISCONTINUED | OUTPATIENT
Start: 2024-12-10 | End: 2024-12-12 | Stop reason: HOSPADM

## 2024-12-10 RX ORDER — ONDANSETRON 2 MG/ML
4 INJECTION INTRAMUSCULAR; INTRAVENOUS EVERY 6 HOURS PRN
Status: DISCONTINUED | OUTPATIENT
Start: 2024-12-10 | End: 2024-12-12 | Stop reason: HOSPADM

## 2024-12-10 RX ORDER — SENNOSIDES A AND B 8.6 MG/1
1 TABLET, FILM COATED ORAL DAILY PRN
Status: DISCONTINUED | OUTPATIENT
Start: 2024-12-10 | End: 2024-12-12 | Stop reason: HOSPADM

## 2024-12-10 RX ORDER — SODIUM CHLORIDE 9 MG/ML
INJECTION, SOLUTION INTRAVENOUS PRN
Status: DISCONTINUED | OUTPATIENT
Start: 2024-12-10 | End: 2024-12-12 | Stop reason: HOSPADM

## 2024-12-10 RX ORDER — OLANZAPINE 5 MG/1
5 TABLET ORAL EVERY 8 HOURS PRN
Status: DISCONTINUED | OUTPATIENT
Start: 2024-12-10 | End: 2024-12-12 | Stop reason: HOSPADM

## 2024-12-10 RX ORDER — ASPIRIN 325 MG
325 TABLET, DELAYED RELEASE (ENTERIC COATED) ORAL 2 TIMES DAILY
Status: DISCONTINUED | OUTPATIENT
Start: 2024-12-10 | End: 2024-12-12 | Stop reason: HOSPADM

## 2024-12-10 RX ORDER — FINASTERIDE 5 MG/1
5 TABLET, FILM COATED ORAL DAILY
Status: DISCONTINUED | OUTPATIENT
Start: 2024-12-10 | End: 2024-12-12 | Stop reason: HOSPADM

## 2024-12-10 RX ORDER — VITAMIN B COMPLEX
1000 TABLET ORAL DAILY
Status: DISCONTINUED | OUTPATIENT
Start: 2024-12-10 | End: 2024-12-12 | Stop reason: HOSPADM

## 2024-12-10 RX ORDER — ACETAMINOPHEN 325 MG/1
650 TABLET ORAL EVERY 6 HOURS PRN
Status: DISCONTINUED | OUTPATIENT
Start: 2024-12-10 | End: 2024-12-12 | Stop reason: HOSPADM

## 2024-12-10 RX ADMIN — OLANZAPINE 10 MG: 5 TABLET, FILM COATED ORAL at 21:19

## 2024-12-10 RX ADMIN — SODIUM BICARBONATE 1300 MG: 650 TABLET ORAL at 08:37

## 2024-12-10 RX ADMIN — TAMSULOSIN HYDROCHLORIDE 0.4 MG: 0.4 CAPSULE ORAL at 17:00

## 2024-12-10 RX ADMIN — CARVEDILOL 6.25 MG: 6.25 TABLET, FILM COATED ORAL at 08:37

## 2024-12-10 RX ADMIN — SODIUM BICARBONATE 1300 MG: 650 TABLET ORAL at 21:19

## 2024-12-10 RX ADMIN — Medication 1000 UNITS: at 08:37

## 2024-12-10 RX ADMIN — SODIUM CHLORIDE, PRESERVATIVE FREE 10 ML: 5 INJECTION INTRAVENOUS at 08:38

## 2024-12-10 RX ADMIN — CARVEDILOL 6.25 MG: 6.25 TABLET, FILM COATED ORAL at 17:00

## 2024-12-10 RX ADMIN — SODIUM CHLORIDE: 9 INJECTION, SOLUTION INTRAVENOUS at 03:51

## 2024-12-10 RX ADMIN — SODIUM CHLORIDE, PRESERVATIVE FREE 10 ML: 5 INJECTION INTRAVENOUS at 21:20

## 2024-12-10 RX ADMIN — NIFEDIPINE 90 MG: 30 TABLET, EXTENDED RELEASE ORAL at 08:37

## 2024-12-10 RX ADMIN — OLANZAPINE 5 MG: 5 TABLET, FILM COATED ORAL at 08:37

## 2024-12-10 RX ADMIN — MELATONIN TAB 3 MG 3 MG: 3 TAB at 21:20

## 2024-12-10 RX ADMIN — FINASTERIDE 5 MG: 5 TABLET, FILM COATED ORAL at 08:37

## 2024-12-10 ASSESSMENT — PAIN SCALES - GENERAL: PAINLEVEL_OUTOF10: 0

## 2024-12-10 NOTE — H&P
FINDINGS: Lower Chest: There is mild dependent atelectasis in the lower lobes.  There is no pleural effusion. Organs: Scattered small hepatic cysts are not significantly changed.  The gallbladder is unremarkable.  The spleen, adrenal glands and pancreas appear normal.  There is moderate left and mild-to-moderate right hydronephrosis. The left kidney is atrophic and there is mild cortical thinning on the right. GI/Bowel: No bowel dilatation or bowel wall thickening is identified.  There are colonic diverticula.  The appendix appears normal.  There is a small hiatal hernia.  The stomach is otherwise unremarkable. Pelvis: The bladder is thickened.  There is a normally located Guerra catheter.  High attenuation blood products are noted in the bladder measuring as much is 7 cm on the sagittal view.  The prostate gland is enlarged measuring up to 5.1 cm.  There is no free fluid. Peritoneum/Retroperitoneum: No evidence of lymphadenopathy.  Aorta is normal in caliber.  No fat stranding, free fluid, free air or focal fluid collection is identified. Bones/Soft Tissues: Bilateral L5 spondylolysis with associated anterolisthesis at L5-S1 appears unchanged.  No acute fracture or suspicious bone lesion is identified.     1. A Guerra catheter is normally located in the bladder.  There are now high attenuation blood products in the bladder measuring up to 7 cm maximally on the sagittal images.  The bladder remains thickened which is favored to be related to cystitis, but there is prostatomegaly, so hypertrophy is possible. There is persistent bilateral hydronephrosis which is moderate on the left and mild-to-moderate on the right.  Left renal atrophy and mild right cortical thinning suggests that this may be chronic. 2. No acute findings elsewhere in the abdomen or pelvis. 3. Colonic diverticulosis. 4. Small hiatal hernia.       Total time spent caring for the patient today was 75 minutes. This includes time spent before the visit

## 2024-12-10 NOTE — ED NOTES
Discussed with PA that Pt's creatinine is elevated and per CT they will not scan him with contrast, given okay from PA to inform CT to change his scan to without contrast.

## 2024-12-10 NOTE — ED NOTES
Patient Name: Tye Resendez  : 1950 74 y.o.  MRN: [id number]  ED Room #: ED-0008/08     Chief complaint:   Chief Complaint   Patient presents with    Abdominal Pain     Guerra cath has blood in tube. Has Abdominal pain all day since cath change with home health nurse.      Hospital Problem/Diagnosis:   Hospital Problems             Last Modified POA    * (Principal) Gross hematuria 2024 Yes         O2 Flow Rate:O2 Device: None (Room air)   (if applicable)  Cardiac Rhythm:   (if applicable)  Active LDA's:   Peripheral IV 24 Posterior;Right Hand (Active)   Site Assessment Clean, dry & intact 12/10/24 1200   Line Status Normal saline locked 12/10/24 1200   Phlebitis Assessment No symptoms 12/10/24 1200   Infiltration Assessment 0 12/10/24 1200   Dressing Status Clean, dry & intact 12/10/24 1200   Dressing Type Transparent 12/10/24 1200       Peripheral IV 24 Right;Anterior Forearm (Active)   Site Assessment Clean, dry & intact 12/10/24 1200   Line Status Infusing 12/10/24 1200   Line Care Connections checked and tightened 12/10/24 1200   Phlebitis Assessment No symptoms 12/10/24 1200   Infiltration Assessment 0 12/10/24 1200   Dressing Status Clean, dry & intact 12/10/24 1200   Dressing Type Transparent 12/10/24 1200            How does patient ambulate? Low Fall Risk (Ambulates by themselves without support    2. How does patient take pills? Whole with Water    3. Is patient alert? Alert    4. Is patient oriented? To Person, To Place, To Time, To Situation, and Follows Commands (Flat Affect)    5.   Patient arrived from:  nursing home  Facility Name: ___________________________________________    6. If patient is disoriented or from a Skill Nursing Facility has family been notified of admission? No    7. Patient belongings? Belongings: Clothing    Disposition of belongings? Kept with Patient     8. Any specific patient or family belongings/needs/dynamics?   a. N/A    9. Miscellaneous

## 2024-12-10 NOTE — PROCEDURES
[facility] Procedure Note  [facility]    Provider: JOSE Oviedo CNP  Patient ID:  Admission Date: 2024 Name: Tye Resendez  OR Date: n/a MRN: [id number]   Patient Location: ED- : 1950  Attending: Walker Senior MD Date of Service: 12/10/2024  PCP: Chris Persaud MD     Diagnoses:  Gross hematuria    Procedure:   1. Delonte Bladder Irrigation- 02290    Findings:   Large volume retention of urine: 100cc  Irrigation of approximately 50CC's of Blood Clot    Indication for Procedure:  The patient has experienced gross hematuria with suspected urinary clot retention.  Nursing unable to directly irrigate through main kinney port per protocol. The patient was informed of the need for bladder drainage based on red urine per kinney.   We had a discussion of the procedure. He had a chance to ask questions which were answered prior to bladder  irrigation.     Procedure Details:  The standard drainage bag was removed from the end of the kinney catheter. Using a nam syringe, sterile saline was instilled retrograde into the bladder and aspirated. Irrigation was performed in this fashion until all clot was presumably removed from the bladder and the until urine cleared to pink. He had a total return of 50mL of clot from his bladder. The standard leg bag was reinserted into the catheter tubing for drainage. He tolerated the procedure well.    Plan:  See Progress Notes for urology plan regarding Kinney    JOSE Oviedo CNP   12/10/2024

## 2024-12-10 NOTE — ED NOTES
Report given to Jerrod RN, all questions answered during handoff report, VSS at this time for the floor.

## 2024-12-11 LAB
ANION GAP SERPL CALCULATED.3IONS-SCNC: 12 MMOL/L (ref 3–16)
BUN SERPL-MCNC: 36 MG/DL (ref 7–20)
CALCIUM SERPL-MCNC: 8.9 MG/DL (ref 8.3–10.6)
CHLORIDE SERPL-SCNC: 108 MMOL/L (ref 99–110)
CO2 SERPL-SCNC: 18 MMOL/L (ref 21–32)
CREAT SERPL-MCNC: 2.6 MG/DL (ref 0.8–1.3)
DEPRECATED RDW RBC AUTO: 16.8 % (ref 12.4–15.4)
GFR SERPLBLD CREATININE-BSD FMLA CKD-EPI: 25 ML/MIN/{1.73_M2}
GLUCOSE SERPL-MCNC: 100 MG/DL (ref 70–99)
HCT VFR BLD AUTO: 37.5 % (ref 40.5–52.5)
HGB BLD-MCNC: 12.5 G/DL (ref 13.5–17.5)
MCH RBC QN AUTO: 29.9 PG (ref 26–34)
MCHC RBC AUTO-ENTMCNC: 33.3 G/DL (ref 31–36)
MCV RBC AUTO: 89.5 FL (ref 80–100)
PLATELET # BLD AUTO: 216 K/UL (ref 135–450)
PMV BLD AUTO: 7.2 FL (ref 5–10.5)
POTASSIUM SERPL-SCNC: 4.2 MMOL/L (ref 3.5–5.1)
RBC # BLD AUTO: 4.19 M/UL (ref 4.2–5.9)
SODIUM SERPL-SCNC: 138 MMOL/L (ref 136–145)
WBC # BLD AUTO: 7.9 K/UL (ref 4–11)

## 2024-12-11 PROCEDURE — 1200000000 HC SEMI PRIVATE

## 2024-12-11 PROCEDURE — 80048 BASIC METABOLIC PNL TOTAL CA: CPT

## 2024-12-11 PROCEDURE — 6370000000 HC RX 637 (ALT 250 FOR IP): Performed by: PHYSICIAN ASSISTANT

## 2024-12-11 PROCEDURE — 2580000003 HC RX 258: Performed by: PHYSICIAN ASSISTANT

## 2024-12-11 PROCEDURE — 36415 COLL VENOUS BLD VENIPUNCTURE: CPT

## 2024-12-11 PROCEDURE — 85027 COMPLETE CBC AUTOMATED: CPT

## 2024-12-11 PROCEDURE — 6370000000 HC RX 637 (ALT 250 FOR IP): Performed by: INTERNAL MEDICINE

## 2024-12-11 RX ORDER — SODIUM BICARBONATE 650 MG/1
1300 TABLET ORAL 3 TIMES DAILY
Status: DISCONTINUED | OUTPATIENT
Start: 2024-12-11 | End: 2024-12-12 | Stop reason: HOSPADM

## 2024-12-11 RX ADMIN — CARVEDILOL 6.25 MG: 6.25 TABLET, FILM COATED ORAL at 16:54

## 2024-12-11 RX ADMIN — TAMSULOSIN HYDROCHLORIDE 0.4 MG: 0.4 CAPSULE ORAL at 16:54

## 2024-12-11 RX ADMIN — SODIUM BICARBONATE 1300 MG: 650 TABLET ORAL at 22:28

## 2024-12-11 RX ADMIN — MELATONIN TAB 3 MG 3 MG: 3 TAB at 22:28

## 2024-12-11 RX ADMIN — OLANZAPINE 10 MG: 5 TABLET, FILM COATED ORAL at 22:29

## 2024-12-11 RX ADMIN — OLANZAPINE 5 MG: 5 TABLET, FILM COATED ORAL at 08:44

## 2024-12-11 RX ADMIN — NIFEDIPINE 90 MG: 30 TABLET, EXTENDED RELEASE ORAL at 08:44

## 2024-12-11 RX ADMIN — FINASTERIDE 5 MG: 5 TABLET, FILM COATED ORAL at 08:44

## 2024-12-11 RX ADMIN — SODIUM CHLORIDE, PRESERVATIVE FREE 10 ML: 5 INJECTION INTRAVENOUS at 08:45

## 2024-12-11 RX ADMIN — SODIUM CHLORIDE, PRESERVATIVE FREE 10 ML: 5 INJECTION INTRAVENOUS at 22:30

## 2024-12-11 RX ADMIN — CARVEDILOL 6.25 MG: 6.25 TABLET, FILM COATED ORAL at 08:44

## 2024-12-11 RX ADMIN — SODIUM BICARBONATE 1300 MG: 650 TABLET ORAL at 08:44

## 2024-12-11 RX ADMIN — SODIUM BICARBONATE 1300 MG: 650 TABLET ORAL at 14:01

## 2024-12-11 RX ADMIN — Medication 1000 UNITS: at 08:44

## 2024-12-11 NOTE — PROCEDURES
PROCEDURE NOTE  [facility] Procedure Note  [facility]    Provider: Joni Miller PA-C  Patient ID:  Admission Date: 2024 Name: Tye Resendez  OR Date: n/a MRN: [id number]   Patient Location: 5TN-5559/5559-01 : 1950  Attending: Walker Senior MD Date of Service: 2024  PCP: Chris Persaud MD     Diagnoses:  1. Urinary tract infection associated with indwelling urethral catheter, initial encounter ([facility])    2. Gross hematuria    3. Acute urinary retention    4. Chronic kidney disease, unspecified CKD stage    Gross hematuria      Procedure:   1. Delonte Bladder Irrigation- 25239    Findings:   Large volume retention of urine: 100 cc  Irrigation of approximately 100 CC's of Blood Clot    Indication for Procedure:  The patient has experienced gross hematuria with suspected urinary clot retention.  Nursing unable to directly irrigate through main kinney port per protocol. The patient was informed of the need for bladder drainage based on red urine and clots.   We had a discussion of the procedure. He had a chance to ask questions which were answered prior to bladder  irrigation.     Procedure Details:  The standard drainage bag was removed from the end of the kinney catheter. Using a nam syringe, sterile saline was instilled retrograde into the bladder and aspirated. Irrigation was performed in this fashion until all clot was presumably removed from the bladder and the until urine cleared to pink. He had a total return of 100mL of clot from his bladder. The standard leg bag was reinserted into the catheter tubing for drainage. He tolerated the procedure well.    Plan:  See Progress Notes for urology plan regarding Kinney    Joni Miller PA-C   2024

## 2024-12-11 NOTE — CONSULTS
MD Darell Vigil MD Aldo Estella, DO                 Office: [phone]                      Fax: [phone]             [place]                   NEPHROLOGY INITIAL CONSULT NOTE:     PATIENT NAME: Tye Resendez  : 1950  MRN: [id number]  REASON FOR CONSULT: For evaluation and management of CKD-4. (My recommendations will be communicated by way of shared medical record.)      RECOMMENDATIONS:   Assistance by urologist, for bladder irrigation and Guerra insertion  For gross hematuria  Encourage oral hydration  Hypertension okay control with current regimen    Continue sodium bicarb overnight  Proscar, Flomax per urology    Coreg, nifedipine for hypertension control    no need for dialysis,    at higher risk for decompensation, needing closer monitoring.    D/C plan from renal stand point:- stable  Follows w/ me - next appointment on 2024       Thank you for allowing me to participate in this patient's care. Please do not hesitate to contact me anytime. We will follow along with you.       Darell Luis MD,  [facility]  Office: [phone] or Via [employer]  Fax: [phone]      =======================================================================================      IMPRESSION:       Admitted on:  2024  3:57 PM   For:    Hospital Problems             Last Modified POA    * (Principal) Gross hematuria 2024 Yes     Chronic kidney disease stage IV  GFR around high 20s-low 30s  Now Estimated Creatinine Clearance: 26 mL/min (A) (based on SCr of 2.6 mg/dL (H)).    Baseline creatinine about low to  Risk factors of hypertension, urinary retention, advancing age  - Follows with us    Urological issues  History of urinary retention needing Guerra catheter  With hematuria history in past with Pseudomonas UTI in the past      Associated problems:   Hypertension  History  Electrolytes stable  Acidosis, mild, non-anion gap,  Hyperglycemia 
AM    CO2 20 12/10/2024 06:07 AM    BUN 36 12/10/2024 06:07 AM    CREATININE 2.4 12/10/2024 06:07 AM    GLUCOSE 110 12/10/2024 06:07 AM    CALCIUM 8.6 12/10/2024 06:07 AM     Urinalysis:   Lab Results   Component Value Date/Time    COLORU RED 12/09/2024 04:52 PM    GLUCOSEU Color Interfer 12/09/2024 04:52 PM    BLOODU Color Interfer 12/09/2024 04:52 PM    NITRU Color Interfer 12/09/2024 04:52 PM    LEUKOCYTESUR Color Interfer 12/09/2024 04:52 PM     Urine culture: No results for input(s): \"LABURIN\" in the last 72 hours.  PSA: No results found for: \"PSA\"      IMAGING     CT ABDOMEN PELVIS WO CONTRAST Additional Contrast? None    Result Date: 12/9/2024  EXAMINATION: CT OF THE ABDOMEN AND PELVIS WITHOUT CONTRAST 12/9/2024 8:06 pm TECHNIQUE: CT of the abdomen and pelvis was performed without the administration of intravenous contrast. Multiplanar reformatted images are provided for review. Automated exposure control, iterative reconstruction, and/or weight based adjustment of the mA/kV was utilized to reduce the radiation dose to as low as reasonably achievable. COMPARISON: 11/09/2024. HISTORY: ORDERING SYSTEM PROVIDED HISTORY: Suprapubic abdominal pain with hematuria after catheter placement earlier today TECHNOLOGIST PROVIDED HISTORY: If patient is on cardiac monitor and/or pulse ox, they may be taken off cardiac monitor and pulse ox, left on O2 if currently on. All monitors reattached when patient returns to room. Reason for exam:->Suprapubic abdominal pain with hematuria after catheter placement earlier today Additional Contrast?->None Decision Support Exception - unselect if not a suspected or confirmed emergency medical condition->Emergency Medical Condition (MA) Reason for Exam: Suprapubic abdominal pain with hematuria after catheter placement earlier today FINDINGS: Lower Chest: There is mild dependent atelectasis in the lower lobes.  There is no pleural effusion. Organs: Scattered small hepatic cysts are not

## 2024-12-11 NOTE — CARE COORDINATION
Patient admitted for blood in urine.  Patient has wound from abscess on left lower leg.  Patient goes to Dr Lorenzo, last visit was 12/4/24. Chart reveiwed, discussed case with nurse Kimberly. Will do daily dry dressing, cleanse skin with dial soap and water. Patient verbalized he will continue wound  care and will follow up with Dr Lorenzo. BRANDAN RIDDLEN, RN, [facility]  Inpatient  Wound/Ostomy Care  [phone]     12/4/24 clinical photo

## 2024-12-11 NOTE — PLAN OF CARE
Problem: Safety - Adult  Goal: Free from fall injury  12/11/2024 0301 by Amy Thakur RN  Outcome: Progressing  12/10/2024 1402 by Nita Lindsey RN  Outcome: Progressing     Problem: Skin/Tissue Integrity - Adult  Goal: Skin integrity remains intact  Outcome: Progressing  Goal: Incisions, wounds, or drain sites healing without S/S of infection  Outcome: Progressing  Goal: Oral mucous membranes remain intact  Outcome: Progressing     Problem: Musculoskeletal - Adult  Goal: Return mobility to safest level of function  Outcome: Progressing  Goal: Maintain proper alignment of affected body part  Outcome: Progressing  Goal: Return ADL status to a safe level of function  Outcome: Progressing     Problem: Genitourinary - Adult  Goal: Absence of urinary retention  Outcome: Progressing  Goal: Urinary catheter remains patent  Outcome: Progressing  Flowsheets (Taken 12/10/2024 2247)  Urinary catheter remains patent: Assess patency of urinary catheter     Problem: Metabolic/Fluid and Electrolytes - Adult  Goal: Electrolytes maintained within normal limits  Outcome: Progressing  Goal: Hemodynamic stability and optimal renal function maintained  Outcome: Progressing  Goal: Glucose maintained within prescribed range  Outcome: Progressing     Problem: Neurosensory - Adult  Goal: Achieves stable or improved neurological status  Outcome: Progressing  Goal: Absence of seizures  Outcome: Progressing  Goal: Remains free of injury related to seizures activity  Outcome: Progressing  Goal: Achieves maximal functionality and self care  Outcome: Progressing

## 2024-12-12 VITALS
HEART RATE: 74 BPM | DIASTOLIC BLOOD PRESSURE: 82 MMHG | WEIGHT: 206.13 LBS | HEIGHT: 67 IN | BODY MASS INDEX: 32.35 KG/M2 | SYSTOLIC BLOOD PRESSURE: 144 MMHG | RESPIRATION RATE: 19 BRPM | TEMPERATURE: 97.6 F | OXYGEN SATURATION: 97 %

## 2024-12-12 LAB
ANION GAP SERPL CALCULATED.3IONS-SCNC: 10 MMOL/L (ref 3–16)
BUN SERPL-MCNC: 33 MG/DL (ref 7–20)
CALCIUM SERPL-MCNC: 8.8 MG/DL (ref 8.3–10.6)
CHLORIDE SERPL-SCNC: 108 MMOL/L (ref 99–110)
CO2 SERPL-SCNC: 22 MMOL/L (ref 21–32)
CREAT SERPL-MCNC: 2.5 MG/DL (ref 0.8–1.3)
DEPRECATED RDW RBC AUTO: 16.2 % (ref 12.4–15.4)
GFR SERPLBLD CREATININE-BSD FMLA CKD-EPI: 26 ML/MIN/{1.73_M2}
GLUCOSE SERPL-MCNC: 101 MG/DL (ref 70–99)
HCT VFR BLD AUTO: 35.1 % (ref 40.5–52.5)
HGB BLD-MCNC: 11.7 G/DL (ref 13.5–17.5)
MCH RBC QN AUTO: 29 PG (ref 26–34)
MCHC RBC AUTO-ENTMCNC: 33.5 G/DL (ref 31–36)
MCV RBC AUTO: 86.6 FL (ref 80–100)
PLATELET # BLD AUTO: 216 K/UL (ref 135–450)
PMV BLD AUTO: 7.4 FL (ref 5–10.5)
POTASSIUM SERPL-SCNC: 3.8 MMOL/L (ref 3.5–5.1)
RBC # BLD AUTO: 4.05 M/UL (ref 4.2–5.9)
SODIUM SERPL-SCNC: 140 MMOL/L (ref 136–145)
WBC # BLD AUTO: 5.8 K/UL (ref 4–11)

## 2024-12-12 PROCEDURE — 85027 COMPLETE CBC AUTOMATED: CPT

## 2024-12-12 PROCEDURE — 94760 N-INVAS EAR/PLS OXIMETRY 1: CPT

## 2024-12-12 PROCEDURE — 2580000003 HC RX 258: Performed by: PHYSICIAN ASSISTANT

## 2024-12-12 PROCEDURE — 6370000000 HC RX 637 (ALT 250 FOR IP): Performed by: INTERNAL MEDICINE

## 2024-12-12 PROCEDURE — 36415 COLL VENOUS BLD VENIPUNCTURE: CPT

## 2024-12-12 PROCEDURE — 80048 BASIC METABOLIC PNL TOTAL CA: CPT

## 2024-12-12 PROCEDURE — 6370000000 HC RX 637 (ALT 250 FOR IP): Performed by: PHYSICIAN ASSISTANT

## 2024-12-12 RX ORDER — ASPIRIN 81 MG/1
81 TABLET ORAL DAILY
Qty: 90 TABLET | Refills: 0 | Status: SHIPPED | OUTPATIENT
Start: 2024-12-25

## 2024-12-12 RX ADMIN — Medication 1000 UNITS: at 07:57

## 2024-12-12 RX ADMIN — OLANZAPINE 5 MG: 5 TABLET, FILM COATED ORAL at 07:57

## 2024-12-12 RX ADMIN — CARVEDILOL 6.25 MG: 6.25 TABLET, FILM COATED ORAL at 07:57

## 2024-12-12 RX ADMIN — NIFEDIPINE 90 MG: 30 TABLET, EXTENDED RELEASE ORAL at 08:00

## 2024-12-12 RX ADMIN — SODIUM CHLORIDE, PRESERVATIVE FREE 10 ML: 5 INJECTION INTRAVENOUS at 07:58

## 2024-12-12 RX ADMIN — FINASTERIDE 5 MG: 5 TABLET, FILM COATED ORAL at 07:57

## 2024-12-12 RX ADMIN — SODIUM BICARBONATE 1300 MG: 650 TABLET ORAL at 07:57

## 2024-12-12 RX ADMIN — SODIUM BICARBONATE 1300 MG: 650 TABLET ORAL at 14:35

## 2024-12-12 NOTE — PLAN OF CARE
Problem: Safety - Adult  Goal: Free from fall injury  Outcome: Progressing     Problem: Skin/Tissue Integrity - Adult  Goal: Skin integrity remains intact  Outcome: Progressing  Goal: Incisions, wounds, or drain sites healing without S/S of infection  Outcome: Progressing  Goal: Oral mucous membranes remain intact  Outcome: Progressing     Problem: Musculoskeletal - Adult  Goal: Return mobility to safest level of function  Outcome: Progressing  Goal: Maintain proper alignment of affected body part  Outcome: Progressing  Goal: Return ADL status to a safe level of function  Outcome: Progressing     Problem: Genitourinary - Adult  Goal: Absence of urinary retention  Outcome: Progressing     Problem: Metabolic/Fluid and Electrolytes - Adult  Goal: Electrolytes maintained within normal limits  Outcome: Progressing  Goal: Hemodynamic stability and optimal renal function maintained  Outcome: Progressing  Goal: Glucose maintained within prescribed range  Outcome: Progressing     Problem: Neurosensory - Adult  Goal: Achieves stable or improved neurological status  Outcome: Progressing  Goal: Absence of seizures  Outcome: Progressing  Goal: Remains free of injury related to seizures activity  Outcome: Progressing  Goal: Achieves maximal functionality and self care  Outcome: Progressing

## 2024-12-12 NOTE — DISCHARGE INSTR - COC
Continuity of Care Form    Patient Name: Tye Resendez   :  1950  MRN:  [id number]    Admit date:  2024  Discharge date:  ***    Code Status Order: Full Code   Advance Directives:   Advance Care Flowsheet Documentation             Admitting Physician:  Dre Valentine MD  PCP: Chris Persuad MD    Discharging Nurse: ***  Discharging Hospital Unit/Room#: 5TN-5559/5559-01  Discharging Unit Phone Number: ***    Emergency Contact:   Extended Emergency Contact Information  Primary Emergency Contact: Arabella Resendez  Home Phone: [phone]  Relation: Spouse  Secondary Emergency Contact: Zen Moore  Mobile Phone: [phone]  Relation: Child    Past Surgical History:  Past Surgical History:   Procedure Laterality Date    KNEE SURGERY Left     LEG SURGERY Left 10/06/2024    LEFT LEG DEBRIDEMENT INCISION AND DRAINAGE performed by Edd Lorenzo MD at [facility] OR       Immunization History:   Immunization History   Administered Date(s) Administered    COVID-19, PFIZER, (age 12y+), IM, 30mcg/0.3mL 2023       Active Problems:  Patient Active Problem List   Diagnosis Code    DIMA (acute kidney injury) ([facility]) N17.9    Hematuria R31.9    Hyponatremia E87.1    Sepsis ([facility]) A41.9    Soft tissue mass M79.89    Abscess of left leg L02.416    Leg mass, left R22.42    Urinary tract infection due to Pseudomonas aeruginosa N39.0, B96.5    Bacteremia R78.81    Class 1 obesity due to excess calories with body mass index (BMI) of 30.0 to 30.9 in adult E66.811, E66.09, Z68.30    Schizoaffective disorder ([facility]) F25.9    Bipolar disorder ([facility]) F31.9    Mixed hyperlipidemia E78.2    Essential hypertension I10    Bilateral hydronephrosis N13.30    Enlarged prostate N40.0    Indwelling catheter present on admission Z96.0    Wound, surgical, nonhealing, initial encounter T81.89XA    Open wound of lower leg, left, initial encounter S81.802A    Gross hematuria R31.0       Isolation/Infection:   Isolation            No Isolation

## 2024-12-12 NOTE — CARE COORDINATION
Case Management Assessment  Initial Evaluation    Date/Time of Evaluation: 12/12/2024 1:36 PM  Assessment Completed by: Rosmery Mckeon    If patient is discharged prior to next notation, then this note serves as note for discharge by case management.    Patient Name: Tye Resendez                   YOB: 1950  Diagnosis: Gross hematuria [R31.0]  Acute urinary retention [R33.8]  Urinary tract infection associated with indwelling urethral catheter, initial encounter ([facility]) [T83.511A, N39.0]  Chronic kidney disease, unspecified CKD stage [N18.9]                   Date / Time: 12/9/2024  3:57 PM    Patient Admission Status: Inpatient   Readmission Risk (Low < 19, Mod (19-27), High > 27): Readmission Risk Score: 20.1    Current PCP: Chris Persaud MD  PCP verified by ? Yes    Chart Reviewed: Yes      History Provided by: Patient  Patient Orientation: Alert and Oriented    Patient Cognition: Alert    Hospitalization in the last 30 days (Readmission):  Yes    If yes, Readmission Assessment in  Navigator will be completed.    Advance Directives:      Code Status: Full Code   Patient's Primary Decision Maker is: Legal Next of Kin      Discharge Planning:    Patient lives with: Spouse/Significant Other Type of Home: Other (Comment), Assisted living ([facility])  Primary Care Giver: Self  Patient Support Systems include: Spouse/Significant Other, Family Members   Current Financial resources: Medicare  Current community resources: Assisted Living ([facility])  Current services prior to admission: None            Current DME:              Type of Home Care services:  None    ADLS  Prior functional level: Independent in ADLs/IADLs, Bathing, Dressing, Toileting, Feeding, Cooking, Housework, Shopping, Mobility  Current functional level: Mobility, Toileting, Dressing, Bathing, Assistance with the following:    PT AM-PAC:   /24  OT AM-PAC:   /24    Family can provide assistance at DC: Yes  Would you like

## 2024-12-12 NOTE — CARE COORDINATION
12/11/24 1312   Readmission Assessment   Number of Days since last admission? 8-30 days   Previous Disposition Other (comment)  [facility]   Who is being Interviewed Patient   What was the patient's/caregiver's perception as to why they think they needed to return back to the hospital? Other (Comment)  (Bleeding)   Did you visit your Primary Care Physician after you left the hospital, before you returned this time? No   Why weren't you able to visit your PCP? Did not have an appointment   Did you see a specialist, such as Cardiac, Pulmonary, Orthopedic Physician, etc. after you left the hospital? Yes   Who advised the patient to return to the hospital? Other (Comment)  (Nurse at [facility])   Does the patient report anything that got in the way of taking their medications? No   In our efforts to provide the best possible care to you and others like you, can you think of anything that we could have done to help you after you left the hospital the first time, so that you might not have needed to return so soon? Other (Comment)  (No)     Electronically signed by Rosmery Mckeon on 12/12/24 at 1:15 PM EST

## 2024-12-12 NOTE — PROGRESS NOTES
Date of Admission: 12/9/2024. Hospital Day: 2       HOSPITAL COURSE  74 y.o. male who presented to ED for evaluation of Kinney catheter dysfunction and hematuria.  Patient has a history of chronic indwelling catheter.  Had Kinney replaced yesterday which was followed by lower abdominal pain . Catheter was replaced in ED and patient passed >1,000 ml of dark bloody urine with blood clots.    Interval  History:   Patient was sleepy, comfortable, reddish urine in the catheter tubing      Physical Exam     BP (!) 157/98   Pulse 83   Temp 97.4 °F (36.3 °C) (Oral)   Resp 18   Ht 1.702 m (5' 7\")   Wt 92.1 kg (203 lb)   SpO2 98%   BMI 31.79 kg/m²     General appearance: No apparent distress, appears stated age and cooperative.  Respiratory:  Normal respiratory effort. Clear to auscultation, bilaterally without Rales/Wheezes/Rhonchi.  Cardiovascular: Regular rate and rhythm with normal S1/S2 without murmurs, rubs or gallops.   Kinney catheter with red discolored urine        Assessment/Plan:  Gross hematuria.  Chronic indwelling kinney catheter.  Urinary catheter dysfunction  - Catheter was replaced in ED and patient passed >1,000 ml of dark bloody urine with blood clots.    - Continue bladder irrigation every 8 hour    Leukocytosis improving  - Suspect reactive leukocytosis    HTN  - Continue home regimen        Active Hospital Problems    Diagnosis     Gross hematuria [R31.0]            DVT Prophylaxis:    Diet: Diet NPO Exceptions are: Sips of Water with Meds  Code Status: Full Code      Dispo - snf .  Expected DC  in/on   .  Barrier to discharge           Chief Complaint:   Chief Complaint   Patient presents with    Abdominal Pain     Kinney cath has blood in tube. Has Abdominal pain all day since cath change with home health nurse.              Medications:  Reviewed    Infusion Medications    sodium chloride       Scheduled Medications    [Held by provider] aspirin  325 mg Oral BID    carvedilol  6.25 mg Oral 
    Date of Admission: 12/9/2024. Hospital Day: 3       HOSPITAL COURSE  74 y.o. male who presented to ED for evaluation of Kinney catheter dysfunction and hematuria.  Patient has a history of chronic indwelling catheter.  Had Kinney replaced yesterday which was followed by lower abdominal pain . Catheter was replaced in ED and patient passed >1,000 ml of dark bloody urine with blood clots.      Interval  History:   Underwent manual bladder irrigation with removal of clots this morning, urine color improving but still somewhat red      Physical Exam     BP (!) 144/82   Pulse 81   Temp 97.3 °F (36.3 °C) (Oral)   Resp 18   Ht 1.702 m (5' 7\")   Wt 93.5 kg (206 lb 2.1 oz)   SpO2 96%   BMI 32.28 kg/m²     General appearance: No apparent distress, appears stated age and cooperative.  Respiratory:  Normal respiratory effort. Clear to auscultation, bilaterally without Rales/Wheezes/Rhonchi.  Cardiovascular: Regular rate and rhythm with normal S1/S2 without murmurs, rubs or gallops.   Kinney catheter with red discolored urine        Assessment/Plan:  Gross hematuria.  Chronic indwelling kinney catheter.  Urinary catheter dysfunction  - Catheter was replaced in ED and patient passed >1,000 ml of dark bloody urine with blood clots.    - Continue bladder irrigation every 8 hour, underwent manual irrigation.  Reviewed note from urology MORRO Miller  -Hold aspirin    Leukocytosis improving  - Suspect reactive leukocytosis.  Improving WBC 7.9 today    -CKD stage IV  Creatinine 2.6 today, renal function appears to be at baseline    HTN  - Continue home regimen        Active Hospital Problems    Diagnosis     Gross hematuria [R31.0]            DVT Prophylaxis:    Diet: ADULT DIET; Regular  Code Status: Full Code      Dispo - snf .  Expected DC  in/on   .  Barrier to discharge           Chief Complaint:   Chief Complaint   Patient presents with    Abdominal Pain     Kinney cath has blood in tube. Has Abdominal pain all day since 
   MD Darell Vigil MD Aldo Estella, DO                 Office: [phone]                      Fax: [phone]             [employer]                   NEPHROLOGY INPATIENT PROGRESS NOTE:     PATIENT NAME: Tye Resendez  : 1950  MRN: [id number]       RECOMMENDATIONS:   Appreciated assistance by urologist, for bladder irrigation and Guerra insertion  For gross hematuria    No IVFs  Encourage oral hydration  Hypertension okay control with current regimen    Continue sodium bicarb 1300 twice daily-increase to 3 times daily    Proscar, Flomax per urology    Coreg, nifedipine for hypertension control    no need for dialysis,    at higher risk for decompensation, needing closer monitoring.    D/C plan from renal stand point:- stable  Follows w/ me - next appointment on 2024       Thank you for allowing me to participate in this patient's care. Please do not hesitate to contact me anytime. We will follow along with you.       Darell Luis MD,  [facility]  Office: [phone] or [employer]  Fax: [phone]      =======================================================================================      IMPRESSION:       Admitted on:  2024  3:57 PM   For:    Hospital Problems             Last Modified POA    * (Principal) Gross hematuria 2024 Yes     Chronic kidney disease stage IV  GFR around high 20s-low 30s  Now Estimated Creatinine Clearance: 26 mL/min (A) (based on SCr of 2.6 mg/dL (H)).    Baseline creatinine about low to  Risk factors of hypertension, urinary retention, advancing age  - Follows with us    Urological issues  History of urinary retention needing Guerra catheter  With hematuria history in past with Pseudomonas UTI in the past      Associated problems:   Hypertension  History  Electrolytes stable  Acidosis, mild, non-anion gap,  Hyperglycemia mild  Anemia of chronic disease mild  Other major problems: Management 
4 Eyes Skin Assessment     NAME:  Tye Resendez  YOB: 1950  MEDICAL RECORD NUMBER:  [id number]    The patient is being assessed for  Admission    I agree that at least one RN has performed a thorough Head to Toe Skin Assessment on the patient. ALL assessment sites listed below have been assessed.      Areas assessed by both nurses:    Head, Face, Ears, Shoulders, Back, Chest, Arms, Elbows, Hands, Sacrum. Buttock, Coccyx, Ischium, Legs. Feet and Heels, and Under Medical Devices         Does the Patient have a Wound? Yes wound(s) were present on assessment. LDA wound assessment was Initiated and completed by RN       Moi Prevention initiated by RN: No  Wound Care Orders initiated by RN: Yes    Pressure Injury (Stage 3,4, Unstageable, DTI, NWPT, and Complex wounds) if present, place Wound referral order by RN under : Yes    New Ostomies, if present place, Ostomy referral order under : No     Nurse 1 eSignature: Electronically signed by Nita Lindsey RN on 12/10/24 at 2:09 PM EST    **SHARE this note so that the co-signing nurse can place an eSignature**    Nurse 2 eSignature: Electronically signed by Senia Gilliam RN on 12/10/24 at 6:02 PM EST   
Admission assessment completed. Routine vitals obtained. Patient is awake, alert and oriented. Respirations are easy and unlabored. Patient does not appear to be in distress, resting comfortably at this time. Call light within reach. Wound care consult placed.  
Patient seen in ED, room 08.  Admission completed with the following exceptions:  4 Eyes Assessment, Immunizations, Vaccines, Rights and Responsibilities, Orientation to room, Plan of Care, Education/Learning Assessment and Education Plan, white board, height and weight, pain assessment and head to toe assessment.  Patient is alert and oriented X 4.  Patient lives in assisted living [facility] with his wife and is being admitted for Gross hematuria.  Home Medication reconciliation will be/has been completed by Pharmacy Staff.  All patient's and/or family's questions answered.     Patient reports he has had a kinney in place for 2 years and it gets changed monthly with last change per patient December 1st.     Patient reports he is independent at facility and does not use any assistive devices to ambulate.       
Pharmacy Home Medication Reconciliation Note    A medication reconciliation has been completed for Tye Resendez 1950    Pharmacy: [facility] [address], [place], [place]  Information provided by: patient and facility    The patient's home medication list is as follows:  No current facility-administered medications on file prior to encounter.     Current Outpatient Medications on File Prior to Encounter   Medication Sig Dispense Refill    sodium bicarbonate 325 MG tablet Take 4 tablets by mouth 2 times daily Take 4 tablets by mouth twice a day 240 tablet 0    aspirin 325 MG EC tablet Take 1 tablet by mouth in the morning and at bedtime (Patient taking differently: Take 1 tablet by mouth 2 times daily) 30 tablet 3    finasteride (PROSCAR) 5 MG tablet Take 1 tablet by mouth daily 30 tablet 3    tamsulosin (FLOMAX) 0.4 MG capsule Take 1 capsule by mouth daily (Patient taking differently: Take 1 capsule by mouth every evening) 30 capsule 3    carvedilol (COREG) 6.25 MG tablet Take 1 tablet by mouth 2 times daily (with meals)      NIFEdipine (ADALAT CC) 90 MG extended release tablet Take 1 tablet by mouth daily Take one tablet by mouth once a day on an empty stomach.      OLANZapine (ZYPREXA) 10 MG tablet Take 0.5-1 tablets by mouth See Admin Instructions Take 1/2 tablet by mouth in the morning and 1 whole tablet at night.      vitamin D (CHOLECALCIFEROL) 25 MCG (1000 UT) TABS tablet Take 1 tablet by mouth daily Indications: Hyperparathyroidism      melatonin 3 MG TABS tablet Take 1 tablet by mouth nightly      OLANZapine (ZYPREXA) 5 MG tablet Take 1 tablet by mouth every 8 hours as needed (Agitation.)         Of note, patient received AM meds only prior to ED arrival.    Timing of last doses updated.    Thank you,  Sharonda Hollis, Jeovany      
Pt picked up [employer] @1440 all belongings with patient.    
Shift assessment complete. VSS. Patient resting in bed. Respirations even and unlabored on room air. Call light within reach. Fall precautions in place. Bed in low, locked position. No additional needs noted at this time.    
Shift assessment. VSS. Patient in bed, alert and oriented. Appears to be not in distress. Respiration even and unlabored on room air. Denies pain, nausea and vomiting at this time. Guerra cathter clean, dry, intact and draining. Patient states that he doesn't need anything by now and will call if ever needed anything. Bed lower in position. Bed alarm on. No further intervention needed at this time. Plan of care ongoing.  
symptoms.    OBJECTIVE     Hospital Problem List:  Principal Problem:    Gross hematuria  Resolved Problems:    * No resolved hospital problems. *      Physical Exam:  Vitals:    12/11/24 1142   BP: (!) 144/82   Pulse: 81   Resp: 18   Temp: 97.3 °F (36.3 °C)   SpO2: 96%     CONSTITUTIONAL: The patient is well nourished/developed, with no distress noted.   NEUROLOGICAL/PSYCHIATRIC: Oriented to place and time, normal affected noted.   CARDIOVASCULAR: Regular rate and rhythm, no evidence of swelling noted.   RESPIRATORY: Normal respiratory effort with no wheezing noted.   ABDOMEN: Abdomen soft, non-tender, non-distended. No enlarged liver or spleen. No hernias noted.   GENITOURINARY: kinney draining peach urine after bedside irrigation today     Ins/Outs:    Intake/Output Summary (Last 24 hours) at 12/11/2024 1206  Last data filed at 12/11/2024 0843  Gross per 24 hour   Intake 1413.54 ml   Output 1200 ml   Net 213.54 ml       Labs:  CBC   Lab Results   Component Value Date/Time    WBC 7.9 12/11/2024 06:34 AM    RBC 4.19 12/11/2024 06:34 AM    HGB 12.5 12/11/2024 06:34 AM    HCT 37.5 12/11/2024 06:34 AM    MCV 89.5 12/11/2024 06:34 AM    MCH 29.9 12/11/2024 06:34 AM    MCHC 33.3 12/11/2024 06:34 AM    RDW 16.8 12/11/2024 06:34 AM     12/11/2024 06:34 AM    MPV 7.2 12/11/2024 06:34 AM     BMP   Lab Results   Component Value Date/Time     12/11/2024 06:34 AM    K 4.2 12/11/2024 06:34 AM    K 4.3 12/10/2024 06:07 AM     12/11/2024 06:34 AM    CO2 18 12/11/2024 06:34 AM    BUN 36 12/11/2024 06:34 AM    CREATININE 2.6 12/11/2024 06:34 AM    GLUCOSE 100 12/11/2024 06:34 AM    CALCIUM 8.9 12/11/2024 06:34 AM     Urinalysis:   Lab Results   Component Value Date/Time    COLORU RED 12/09/2024 04:52 PM    GLUCOSEU Color Interfer 12/09/2024 04:52 PM    BLOODU Color Interfer 12/09/2024 04:52 PM    NITRU Color Interfer 12/09/2024 04:52 PM    LEUKOCYTESUR Color Interfer 12/09/2024 04:52 PM     Urine culture:   Recent 
36 hours     PSA: No results found for: \"PSA\"     Imaging:  CT ABDOMEN PELVIS WO CONTRAST Additional Contrast? None    Result Date: 12/9/2024  EXAMINATION: CT OF THE ABDOMEN AND PELVIS WITHOUT CONTRAST 12/9/2024 8:06 pm TECHNIQUE: CT of the abdomen and pelvis was performed without the administration of intravenous contrast. Multiplanar reformatted images are provided for review. Automated exposure control, iterative reconstruction, and/or weight based adjustment of the mA/kV was utilized to reduce the radiation dose to as low as reasonably achievable. COMPARISON: 11/09/2024. HISTORY: ORDERING SYSTEM PROVIDED HISTORY: Suprapubic abdominal pain with hematuria after catheter placement earlier today TECHNOLOGIST PROVIDED HISTORY: If patient is on cardiac monitor and/or pulse ox, they may be taken off cardiac monitor and pulse ox, left on O2 if currently on. All monitors reattached when patient returns to room. Reason for exam:->Suprapubic abdominal pain with hematuria after catheter placement earlier today Additional Contrast?->None Decision Support Exception - unselect if not a suspected or confirmed emergency medical condition->Emergency Medical Condition (MA) Reason for Exam: Suprapubic abdominal pain with hematuria after catheter placement earlier today FINDINGS: Lower Chest: There is mild dependent atelectasis in the lower lobes.  There is no pleural effusion. Organs: Scattered small hepatic cysts are not significantly changed.  The gallbladder is unremarkable.  The spleen, adrenal glands and pancreas appear normal.  There is moderate left and mild-to-moderate right hydronephrosis. The left kidney is atrophic and there is mild cortical thinning on the right. GI/Bowel: No bowel dilatation or bowel wall thickening is identified.  There are colonic diverticula.  The appendix appears normal.  There is a small hiatal hernia.  The stomach is otherwise unremarkable. Pelvis: The bladder is thickened.  There is a normally 
appearance. Colonic diverticulosis is present without evidence of acute diverticulitis. Peritoneum: No evidence of ascites or peritoneal nodularity. Lymph nodes: No evidence of suspicious lymphadenopathy. Vasculature: Vascular calcifications are present along the abdominal aorta without aneurysmal dilation. Soft Tissues/Bones: No acute soft tissue abnormalities are evident. Multilevel degenerative joint disease is present along the visualized spine. No acute osseous abnormalities or suspicious lesions are present.  Bilateral pars defect noted at L5 with subsequent anterolisthesis of L5 on S1.     Interval resolution of hydronephrosis, which was likely related to reflux from significant urinary bladder distension on prior examination. Appropriate position of Guerra catheter with homogeneously increased attenuation of the urinary bladder lumen, consistent with urine mixed with resolving blood products from prior injury.           Imaging: [unfilled]         =======================================================================================  Please note that this chart entry has been generated using voice recognition software, mainly.  So please excuse brevity and/or typos.  While every effort and attempts have been made to ensure the accuracy of this automated transcription, some errors may have occurred; and certain words and phrases in transcription may not be entered as intended.  However, inadvertent computerized transcription errors may be present.  So please contact us if any clarification needed.

## 2024-12-17 ENCOUNTER — OFFICE VISIT (OUTPATIENT)
Dept: ORTHOPEDIC SURGERY | Age: 74
End: 2024-12-17

## 2024-12-17 VITALS — WEIGHT: 206 LBS | HEIGHT: 67 IN | BODY MASS INDEX: 32.33 KG/M2

## 2024-12-17 DIAGNOSIS — R22.42 LEG MASS, LEFT: ICD-10-CM

## 2024-12-17 DIAGNOSIS — L02.416 ABSCESS OF LEFT LEG: Primary | ICD-10-CM

## 2024-12-17 PROCEDURE — 99024 POSTOP FOLLOW-UP VISIT: CPT | Performed by: NURSE PRACTITIONER

## 2024-12-17 NOTE — PROGRESS NOTES
DIAGNOSIS:  Left leg deep mass and abscess, s/p excision mass and  incision and drainage.    DATE OF SURGERY:  10/6/2024.    HISTORY OF PRESENT ILLNESS:  Mr. Resendez 74 y.o.  male who came in today for 8 weeks postoperative visit.  The patient denies any significant pain in the left leg. Rates pain a 0/10 VAS and doing better. He has been WBAT.  No numbness or tingling sensation. No fever or Chills. He grew up MSSA and on antibiotics. He has a h/o left knee fusion and chronic left foot drop since 1971. He does not use an AFO. He has wound care nurse change dressing at home every M,W,F.     PHYSICAL EXAMINATION:  The incision with dried scab and smaller in size.  No signs of any erythema or drainage, mild swelling. He has no pain with the active or passive range of motion of the left knee, secondary to the knee fusion. Left foot drop.  He has intact sensation distally, and he is neurovascularly intact.    10/22/2024:      11/19/2024:        12/4/2024:          12/17/2024:      Surgical pathology consistent with reactive fibrous tissue with associated acute/chronic inflammation and dystrophic calcifications.       IMPRESSION:  8 weeks out from left left deep mass and abscess, s/p excision mass and  incision and drainage, and doing very well.    PLAN: He can go back to normal activity with no heavy impact activities. Continue dry bandage, applied today and instructed in care. Continue wound care. The patient will come back for a follow up in 6 weeks for wound check.      The patient understands that there is a chance of abscess recurrence even after surgical I&D.        Letha Desir, JOSE - CNP

## 2024-12-18 ENCOUNTER — ANESTHESIA EVENT (OUTPATIENT)
Dept: OPERATING ROOM | Age: 74
End: 2024-12-18
Payer: OTHER GOVERNMENT

## 2024-12-18 ENCOUNTER — HOSPITAL ENCOUNTER (INPATIENT)
Age: 74
LOS: 2 days | Discharge: HOME | End: 2024-12-20
Attending: STUDENT IN AN ORGANIZED HEALTH CARE EDUCATION/TRAINING PROGRAM | Admitting: INTERNAL MEDICINE
Payer: OTHER GOVERNMENT

## 2024-12-18 ENCOUNTER — ANESTHESIA (OUTPATIENT)
Dept: OPERATING ROOM | Age: 74
End: 2024-12-18
Payer: OTHER GOVERNMENT

## 2024-12-18 DIAGNOSIS — R31.0 GROSS HEMATURIA: Primary | ICD-10-CM

## 2024-12-18 DIAGNOSIS — N18.9 CHRONIC KIDNEY DISEASE, UNSPECIFIED CKD STAGE: ICD-10-CM

## 2024-12-18 PROBLEM — R33.9 URINARY RETENTION: Status: ACTIVE | Noted: 2024-12-18

## 2024-12-18 LAB
ANION GAP SERPL CALCULATED.3IONS-SCNC: 13 MMOL/L (ref 3–16)
BASOPHILS # BLD: 0.1 K/UL (ref 0–0.2)
BASOPHILS NFR BLD: 1.4 %
BUN SERPL-MCNC: 33 MG/DL (ref 7–20)
CALCIUM SERPL-MCNC: 9.1 MG/DL (ref 8.3–10.6)
CHLORIDE SERPL-SCNC: 107 MMOL/L (ref 99–110)
CO2 SERPL-SCNC: 20 MMOL/L (ref 21–32)
CREAT SERPL-MCNC: 2.6 MG/DL (ref 0.8–1.3)
DEPRECATED RDW RBC AUTO: 16.2 % (ref 12.4–15.4)
EOSINOPHIL # BLD: 0.3 K/UL (ref 0–0.6)
EOSINOPHIL NFR BLD: 4.2 %
GFR SERPLBLD CREATININE-BSD FMLA CKD-EPI: 25 ML/MIN/{1.73_M2}
GLUCOSE SERPL-MCNC: 108 MG/DL (ref 70–99)
HCT VFR BLD AUTO: 37.7 % (ref 40.5–52.5)
HCT VFR BLD AUTO: 38.2 % (ref 40.5–52.5)
HCT VFR BLD AUTO: 38.3 % (ref 40.5–52.5)
HGB BLD-MCNC: 12.3 G/DL (ref 13.5–17.5)
HGB BLD-MCNC: 12.3 G/DL (ref 13.5–17.5)
HGB BLD-MCNC: 12.5 G/DL (ref 13.5–17.5)
LYMPHOCYTES # BLD: 2 K/UL (ref 1–5.1)
LYMPHOCYTES NFR BLD: 29.7 %
MCH RBC QN AUTO: 28.9 PG (ref 26–34)
MCHC RBC AUTO-ENTMCNC: 32.7 G/DL (ref 31–36)
MCV RBC AUTO: 88.4 FL (ref 80–100)
MONOCYTES # BLD: 0.7 K/UL (ref 0–1.3)
MONOCYTES NFR BLD: 11.1 %
NEUTROPHILS # BLD: 3.6 K/UL (ref 1.7–7.7)
NEUTROPHILS NFR BLD: 53.6 %
PLATELET # BLD AUTO: 302 K/UL (ref 135–450)
PMV BLD AUTO: 7 FL (ref 5–10.5)
POTASSIUM SERPL-SCNC: 4.2 MMOL/L (ref 3.5–5.1)
RBC # BLD AUTO: 4.26 M/UL (ref 4.2–5.9)
SODIUM SERPL-SCNC: 140 MMOL/L (ref 136–145)
WBC # BLD AUTO: 6.6 K/UL (ref 4–11)

## 2024-12-18 PROCEDURE — 2500000003 HC RX 250 WO HCPCS: Performed by: UROLOGY

## 2024-12-18 PROCEDURE — 6360000002 HC RX W HCPCS: Performed by: NURSE ANESTHETIST, CERTIFIED REGISTERED

## 2024-12-18 PROCEDURE — 85014 HEMATOCRIT: CPT

## 2024-12-18 PROCEDURE — 80048 BASIC METABOLIC PNL TOTAL CA: CPT

## 2024-12-18 PROCEDURE — 85025 COMPLETE CBC W/AUTO DIFF WBC: CPT

## 2024-12-18 PROCEDURE — 3600000014 HC SURGERY LEVEL 4 ADDTL 15MIN: Performed by: UROLOGY

## 2024-12-18 PROCEDURE — 1200000000 HC SEMI PRIVATE

## 2024-12-18 PROCEDURE — 99285 EMERGENCY DEPT VISIT HI MDM: CPT

## 2024-12-18 PROCEDURE — 7100000001 HC PACU RECOVERY - ADDTL 15 MIN: Performed by: UROLOGY

## 2024-12-18 PROCEDURE — 36415 COLL VENOUS BLD VENIPUNCTURE: CPT

## 2024-12-18 PROCEDURE — 7100000000 HC PACU RECOVERY - FIRST 15 MIN: Performed by: UROLOGY

## 2024-12-18 PROCEDURE — 0TCB8ZZ EXTIRPATION OF MATTER FROM BLADDER, VIA NATURAL OR ARTIFICIAL OPENING ENDOSCOPIC: ICD-10-PCS | Performed by: UROLOGY

## 2024-12-18 PROCEDURE — 85018 HEMOGLOBIN: CPT

## 2024-12-18 PROCEDURE — 3700000000 HC ANESTHESIA ATTENDED CARE: Performed by: UROLOGY

## 2024-12-18 PROCEDURE — 3700000001 HC ADD 15 MINUTES (ANESTHESIA): Performed by: UROLOGY

## 2024-12-18 PROCEDURE — 3600000004 HC SURGERY LEVEL 4 BASE: Performed by: UROLOGY

## 2024-12-18 PROCEDURE — 51798 US URINE CAPACITY MEASURE: CPT

## 2024-12-18 PROCEDURE — 6370000000 HC RX 637 (ALT 250 FOR IP): Performed by: UROLOGY

## 2024-12-18 PROCEDURE — 2709999900 HC NON-CHARGEABLE SUPPLY: Performed by: UROLOGY

## 2024-12-18 RX ORDER — SODIUM BICARBONATE 325 MG/1
1300 TABLET ORAL 2 TIMES DAILY
COMMUNITY

## 2024-12-18 RX ORDER — ONDANSETRON 2 MG/ML
4 INJECTION INTRAMUSCULAR; INTRAVENOUS
Status: DISCONTINUED | OUTPATIENT
Start: 2024-12-18 | End: 2024-12-18 | Stop reason: HOSPADM

## 2024-12-18 RX ORDER — ACETAMINOPHEN 650 MG/1
650 SUPPOSITORY RECTAL EVERY 6 HOURS PRN
Status: DISCONTINUED | OUTPATIENT
Start: 2024-12-18 | End: 2024-12-20 | Stop reason: HOSPADM

## 2024-12-18 RX ORDER — NIFEDIPINE 30 MG
90 TABLET, EXTENDED RELEASE ORAL DAILY
Status: DISCONTINUED | OUTPATIENT
Start: 2024-12-18 | End: 2024-12-20 | Stop reason: HOSPADM

## 2024-12-18 RX ORDER — SODIUM CHLORIDE 9 MG/ML
INJECTION, SOLUTION INTRAVENOUS PRN
Status: DISCONTINUED | OUTPATIENT
Start: 2024-12-18 | End: 2024-12-20 | Stop reason: HOSPADM

## 2024-12-18 RX ORDER — OLANZAPINE 5 MG/1
5 TABLET ORAL DAILY
Status: DISCONTINUED | OUTPATIENT
Start: 2024-12-18 | End: 2024-12-20 | Stop reason: HOSPADM

## 2024-12-18 RX ORDER — SODIUM CHLORIDE 9 MG/ML
INJECTION, SOLUTION INTRAVENOUS PRN
Status: DISCONTINUED | OUTPATIENT
Start: 2024-12-18 | End: 2024-12-18 | Stop reason: HOSPADM

## 2024-12-18 RX ORDER — PROPOFOL 10 MG/ML
INJECTION, EMULSION INTRAVENOUS
Status: DISCONTINUED | OUTPATIENT
Start: 2024-12-18 | End: 2024-12-18 | Stop reason: SDUPTHER

## 2024-12-18 RX ORDER — DIPHENHYDRAMINE HYDROCHLORIDE 50 MG/ML
12.5 INJECTION INTRAMUSCULAR; INTRAVENOUS
Status: DISCONTINUED | OUTPATIENT
Start: 2024-12-18 | End: 2024-12-18 | Stop reason: HOSPADM

## 2024-12-18 RX ORDER — HYDROMORPHONE HYDROCHLORIDE 2 MG/ML
0.5 INJECTION, SOLUTION INTRAMUSCULAR; INTRAVENOUS; SUBCUTANEOUS EVERY 5 MIN PRN
Status: DISCONTINUED | OUTPATIENT
Start: 2024-12-18 | End: 2024-12-18 | Stop reason: HOSPADM

## 2024-12-18 RX ORDER — FINASTERIDE 5 MG/1
5 TABLET, FILM COATED ORAL DAILY
Status: DISCONTINUED | OUTPATIENT
Start: 2024-12-18 | End: 2024-12-18

## 2024-12-18 RX ORDER — TAMSULOSIN HYDROCHLORIDE 0.4 MG/1
0.4 CAPSULE ORAL EVERY EVENING
Status: DISCONTINUED | OUTPATIENT
Start: 2024-12-18 | End: 2024-12-18

## 2024-12-18 RX ORDER — MAGNESIUM HYDROXIDE 1200 MG/15ML
LIQUID ORAL CONTINUOUS PRN
Status: COMPLETED | OUTPATIENT
Start: 2024-12-18 | End: 2024-12-18

## 2024-12-18 RX ORDER — CARVEDILOL 6.25 MG/1
6.25 TABLET ORAL 2 TIMES DAILY WITH MEALS
Status: DISCONTINUED | OUTPATIENT
Start: 2024-12-18 | End: 2024-12-20 | Stop reason: HOSPADM

## 2024-12-18 RX ORDER — OLANZAPINE 5 MG/1
5 TABLET ORAL EVERY 8 HOURS PRN
Status: DISCONTINUED | OUTPATIENT
Start: 2024-12-18 | End: 2024-12-20 | Stop reason: HOSPADM

## 2024-12-18 RX ORDER — SODIUM CHLORIDE 0.9 % (FLUSH) 0.9 %
5-40 SYRINGE (ML) INJECTION PRN
Status: DISCONTINUED | OUTPATIENT
Start: 2024-12-18 | End: 2024-12-18 | Stop reason: HOSPADM

## 2024-12-18 RX ORDER — ONDANSETRON 4 MG/1
4 TABLET, ORALLY DISINTEGRATING ORAL EVERY 8 HOURS PRN
Status: DISCONTINUED | OUTPATIENT
Start: 2024-12-18 | End: 2024-12-20 | Stop reason: HOSPADM

## 2024-12-18 RX ORDER — POLYETHYLENE GLYCOL 3350 17 G/17G
17 POWDER, FOR SOLUTION ORAL DAILY PRN
Status: DISCONTINUED | OUTPATIENT
Start: 2024-12-18 | End: 2024-12-20 | Stop reason: HOSPADM

## 2024-12-18 RX ORDER — SODIUM CHLORIDE 0.9 % (FLUSH) 0.9 %
5-40 SYRINGE (ML) INJECTION EVERY 12 HOURS SCHEDULED
Status: DISCONTINUED | OUTPATIENT
Start: 2024-12-18 | End: 2024-12-20 | Stop reason: HOSPADM

## 2024-12-18 RX ORDER — SODIUM CHLORIDE 0.9 % (FLUSH) 0.9 %
5-40 SYRINGE (ML) INJECTION PRN
Status: DISCONTINUED | OUTPATIENT
Start: 2024-12-18 | End: 2024-12-20 | Stop reason: HOSPADM

## 2024-12-18 RX ORDER — OLANZAPINE 5 MG/1
10 TABLET ORAL NIGHTLY
Status: DISCONTINUED | OUTPATIENT
Start: 2024-12-18 | End: 2024-12-20 | Stop reason: HOSPADM

## 2024-12-18 RX ORDER — HYDRALAZINE HYDROCHLORIDE 20 MG/ML
10 INJECTION INTRAMUSCULAR; INTRAVENOUS EVERY 4 HOURS PRN
Status: DISCONTINUED | OUTPATIENT
Start: 2024-12-18 | End: 2024-12-20 | Stop reason: HOSPADM

## 2024-12-18 RX ORDER — SODIUM CHLORIDE 0.9 % (FLUSH) 0.9 %
5-40 SYRINGE (ML) INJECTION EVERY 12 HOURS SCHEDULED
Status: DISCONTINUED | OUTPATIENT
Start: 2024-12-18 | End: 2024-12-18 | Stop reason: HOSPADM

## 2024-12-18 RX ORDER — LIDOCAINE HYDROCHLORIDE 20 MG/ML
INJECTION, SOLUTION EPIDURAL; INFILTRATION; INTRACAUDAL; PERINEURAL
Status: DISCONTINUED | OUTPATIENT
Start: 2024-12-18 | End: 2024-12-18 | Stop reason: SDUPTHER

## 2024-12-18 RX ORDER — HYDROMORPHONE HYDROCHLORIDE 2 MG/ML
0.25 INJECTION, SOLUTION INTRAMUSCULAR; INTRAVENOUS; SUBCUTANEOUS EVERY 5 MIN PRN
Status: DISCONTINUED | OUTPATIENT
Start: 2024-12-18 | End: 2024-12-18 | Stop reason: HOSPADM

## 2024-12-18 RX ORDER — NALOXONE HYDROCHLORIDE 0.4 MG/ML
INJECTION, SOLUTION INTRAMUSCULAR; INTRAVENOUS; SUBCUTANEOUS PRN
Status: DISCONTINUED | OUTPATIENT
Start: 2024-12-18 | End: 2024-12-18 | Stop reason: HOSPADM

## 2024-12-18 RX ORDER — ONDANSETRON 2 MG/ML
4 INJECTION INTRAMUSCULAR; INTRAVENOUS EVERY 6 HOURS PRN
Status: DISCONTINUED | OUTPATIENT
Start: 2024-12-18 | End: 2024-12-20 | Stop reason: HOSPADM

## 2024-12-18 RX ORDER — ACETAMINOPHEN 325 MG/1
650 TABLET ORAL EVERY 6 HOURS PRN
Status: DISCONTINUED | OUTPATIENT
Start: 2024-12-18 | End: 2024-12-20 | Stop reason: HOSPADM

## 2024-12-18 RX ORDER — NIFEDIPINE 90 MG/1
90 TABLET, FILM COATED, EXTENDED RELEASE ORAL DAILY
Status: DISCONTINUED | OUTPATIENT
Start: 2024-12-18 | End: 2024-12-18 | Stop reason: CLARIF

## 2024-12-18 RX ADMIN — LIDOCAINE HYDROCHLORIDE 100 MG: 20 INJECTION, SOLUTION EPIDURAL; INFILTRATION; INTRACAUDAL; PERINEURAL at 16:51

## 2024-12-18 RX ADMIN — PHENYLEPHRINE HYDROCHLORIDE 300 MCG: 10 INJECTION INTRAVENOUS at 17:17

## 2024-12-18 RX ADMIN — OLANZAPINE 10 MG: 5 TABLET, FILM COATED ORAL at 19:59

## 2024-12-18 RX ADMIN — PHENYLEPHRINE HYDROCHLORIDE 300 MCG: 10 INJECTION INTRAVENOUS at 17:08

## 2024-12-18 RX ADMIN — SODIUM CHLORIDE, PRESERVATIVE FREE 10 ML: 5 INJECTION INTRAVENOUS at 19:59

## 2024-12-18 RX ADMIN — PROPOFOL 100 MG: 10 INJECTION, EMULSION INTRAVENOUS at 16:51

## 2024-12-18 RX ADMIN — PHENYLEPHRINE HYDROCHLORIDE 200 MCG: 10 INJECTION INTRAVENOUS at 16:56

## 2024-12-18 RX ADMIN — CARVEDILOL 6.25 MG: 6.25 TABLET, FILM COATED ORAL at 18:41

## 2024-12-18 RX ADMIN — PHENYLEPHRINE HYDROCHLORIDE 200 MCG: 10 INJECTION INTRAVENOUS at 16:59

## 2024-12-18 RX ADMIN — PROPOFOL 50 MG: 10 INJECTION, EMULSION INTRAVENOUS at 16:54

## 2024-12-18 ASSESSMENT — PAIN - FUNCTIONAL ASSESSMENT
PAIN_FUNCTIONAL_ASSESSMENT: 0-10
PAIN_FUNCTIONAL_ASSESSMENT: NONE - DENIES PAIN

## 2024-12-18 ASSESSMENT — PAIN DESCRIPTION - DESCRIPTORS: DESCRIPTORS: ACHING;PRESSURE

## 2024-12-18 NOTE — ED NOTES
Kinney removed per MD Iniguez. Moderate bleeding when kinney was removed. MD made aware and at bedside. Pressure applied to the area at this time

## 2024-12-18 NOTE — ACP (ADVANCE CARE PLANNING)
Advanced Care Planning Note.    Purpose of Encounter: Advanced care planning in light of hematuria  Parties In Attendance: Patient  Decisional Capacity: Yes  Subjective: Patient with hematuria  Objective: Cr 2.6  Goals of Care Determination: Patient wants full support (CPR, vent, surgery, HD, trach, PEG)  Plan:  Serial H/H.  Urology consult.  Cysto  Code Status: Full code   Time spent on Advanced care Plannin minutes  Advanced Care Planning Documents: Completed advanced directives on chart, wife is the POA.    Alexey Benson MD  2024 12:11 PM

## 2024-12-18 NOTE — PROGRESS NOTES
Pt arrived from OR to PACU, awakens to voice, denies pain at this time. VSS, O2 sats 99% on room air. 3 way kinney with CBI infusing, urine colorless, clear. Dr. Delgadillo at bedside and OK with plugging CBI port. Will monitor.    Eloped (saw a physician/midlevel provider but left without telling anyone)

## 2024-12-18 NOTE — PROGRESS NOTES
75 y/o male with gross hematuria    only blood thinner is asa   no recent gu surgery   ct recently with 7 cm clot    now with diff voiding and unable to irriagate the clot from the bladder    presents now for cysto and clot evaculation    no sign of infection

## 2024-12-18 NOTE — CARE COORDINATION
Case Management Assessment  Initial Evaluation    Date/Time of Evaluation: 12/18/2024 9:16 AM  Assessment Completed by: SHAWANDA Ba    If patient is discharged prior to next notation, then this note serves as note for discharge by case management.    Patient Name: Tye Resendez                   YOB: 1950  Diagnosis: Gross hematuria [R31.0]  Hematuria [R31.9]  Chronic kidney disease, unspecified CKD stage [N18.9]                   Date / Time: 12/18/2024  5:05 AM    Patient Admission Status: Inpatient   Readmission Risk (Low < 19, Mod (19-27), High > 27): Readmission Risk Score: 21.2    Current PCP: Chris Persaud MD  PCP verified by CM? Yes    Chart Reviewed: Yes      History Provided by: Patient  Patient Orientation: Alert and Oriented    Patient Cognition: Alert    Hospitalization in the last 30 days (Readmission):  Yes    If yes, Readmission Assessment in  Navigator will be completed.    Advance Directives:      Code Status: Full Code   Patient's Primary Decision Maker is: Legal Next of Kin      Discharge Planning:    Patient expects to discharge to: Assisted living  Plan for transportation at discharge: Self    Financial    Payor: [employer] / Plan: [employer] / Product Type: *No Product type* /         Current Assisted Living Information:  Patient admitted from:  [facility]   ([facility]: [facility])  [address] [place] [id number]  Phone: [phone]  Fax: [phone]         Additional Case Management Notes: Patient is from [facility] and is not active with any services at home. Patient reports his family will transport the patient home at discharge.    The Plan for Transition of Care is related to the following treatment goals of Gross hematuria [R31.0]  Hematuria [R31.9]  Chronic kidney disease, unspecified CKD stage [N18.9]    The Patient and/or Patient Representative Agree with the Discharge Plan?      SHAWANDA Ba  Case Management Department

## 2024-12-18 NOTE — PROGRESS NOTES
Pt resting quietly in bed, tolerating PO intake. VSS, O2 sats 100% on room air. 3 way kinney remains in place, urine colorless, clear. Pt seen by anesthesia, phase 1 criteria met. Will transfer pt to 5T.

## 2024-12-18 NOTE — ANESTHESIA PRE PROCEDURE
oz)     Body mass index is 32.26 kg/m².    CBC:   Lab Results   Component Value Date/Time    WBC 6.6 12/18/2024 05:46 AM    RBC 4.26 12/18/2024 05:46 AM    HGB 12.5 12/18/2024 01:43 PM    HCT 38.3 12/18/2024 01:43 PM    MCV 88.4 12/18/2024 05:46 AM    RDW 16.2 12/18/2024 05:46 AM     12/18/2024 05:46 AM       CMP:   Lab Results   Component Value Date/Time     12/18/2024 05:46 AM    K 4.2 12/18/2024 05:46 AM    K 4.3 12/10/2024 06:07 AM     12/18/2024 05:46 AM    CO2 20 12/18/2024 05:46 AM    BUN 33 12/18/2024 05:46 AM    CREATININE 2.6 12/18/2024 05:46 AM    AGRATIO 1.2 12/09/2024 07:20 PM    LABGLOM 25 12/18/2024 05:46 AM    GLUCOSE 108 12/18/2024 05:46 AM    CALCIUM 9.1 12/18/2024 05:46 AM    BILITOT 0.5 12/09/2024 07:20 PM    ALKPHOS 118 12/09/2024 07:20 PM    AST 24 12/09/2024 07:20 PM    ALT 24 12/09/2024 07:20 PM       POC Tests: No results for input(s): \"POCGLU\", \"POCNA\", \"POCK\", \"POCCL\", \"POCBUN\", \"POCHEMO\", \"POCHCT\" in the last 72 hours.    Coags:   Lab Results   Component Value Date/Time    PROTIME 15.7 10/04/2024 05:58 PM    INR 1.23 10/04/2024 05:58 PM       HCG (If Applicable): No results found for: \"PREGTESTUR\", \"PREGSERUM\", \"HCG\", \"HCGQUANT\"     ABGs: No results found for: \"PHART\", \"PO2ART\", \"MSC2LLU\", \"YJD1HFJ\", \"BEART\", \"E4WNCVBN\"     Type & Screen (If Applicable):  No results found for: \"ABORH\", \"LABANTI\"    Drug/Infectious Status (If Applicable):  No results found for: \"HIV\", \"HEPCAB\"    COVID-19 Screening (If Applicable):   Lab Results   Component Value Date/Time    COVID19 NOT DETECTED 11/09/2024 02:46 AM    COVID19 Not Detected 02/03/2021 04:50 PM           Anesthesia Evaluation  Patient summary reviewed and Nursing notes reviewed  Airway: Mallampati: II          Dental:          Pulmonary:                              Cardiovascular:  Exercise tolerance: poor (<4 METS)  (+) hypertension:                  Neuro/Psych:   (+) psychiatric history: stable with treatment

## 2024-12-18 NOTE — CONSULTS
Urology Consult Note  [facility]     Patient: Tye Resendez MRN: [id number]  Room/Bed: 5TN-5567/5567-01   YOB: 1950  Age/Sex: 74 y.o.male  Admission Date: 12/18/2024     Date of Service:  12/18/2024    Consulting Provider: Joni Miller PA-C   Admitting/Requesting Physician: Jurgen Thao DO  Primary Care Physician: Chris Persaud MD    Reason for Consult: Gross Hematuria     ASSESSMENT/PLAN     74 yo male with hx of urinary retention managed with chronic kinney for several years. He was recently admitted a week ago for gross hematuria following cath exchange at [facility]. CT at that time showed catheter in bladder with 7 cm clot. He underwent multiple rounds of bedside irrigation and eventually his urine cleared up with no residual clot and he was discharged home on 12/12/24.   He now presents today with gross hematuria again, where he began bleeding after his kinney was again apparently exchanged one day ago. This was reported by EMS but patient tells me he cannot remember if cath was changed or not. His kinney was removed by nursing in the ED and large amount of blood followed, this was ultimately able to be controlled with direct pressure with guaze. He still has large amount of blood oozing per urethra. His bladder scan shows 750. He is comfortable appearing and denies significant pain. He is afvss, cr 2.6. His hgb is stable at 12.3.     Recommendations:  Reviewed with Dr. Delgadillo will keep patient NPO and get him set up for cysto clot evac this afternoon.     All patient questions were answered. He understands the plan as listed above.    HISTORY     Chief Complaint:   Chief Complaint   Patient presents with    Urinary Catheter     Pt arrived [place] ems from [facility] with complaints of blood in his catheter. Pt states that he began to bleed after they changed out his kinney. Pt has no other complaints         History of Present Illness:  the lower lobes.  There is no pleural effusion. Organs: Scattered small hepatic cysts are not significantly changed.  The gallbladder is unremarkable.  The spleen, adrenal glands and pancreas appear normal.  There is moderate left and mild-to-moderate right hydronephrosis. The left kidney is atrophic and there is mild cortical thinning on the right. GI/Bowel: No bowel dilatation or bowel wall thickening is identified.  There are colonic diverticula.  The appendix appears normal.  There is a small hiatal hernia.  The stomach is otherwise unremarkable. Pelvis: The bladder is thickened.  There is a normally located Guerra catheter.  High attenuation blood products are noted in the bladder measuring as much is 7 cm on the sagittal view.  The prostate gland is enlarged measuring up to 5.1 cm.  There is no free fluid. Peritoneum/Retroperitoneum: No evidence of lymphadenopathy.  Aorta is normal in caliber.  No fat stranding, free fluid, free air or focal fluid collection is identified. Bones/Soft Tissues: Bilateral L5 spondylolysis with associated anterolisthesis at L5-S1 appears unchanged.  No acute fracture or suspicious bone lesion is identified.     1. A Guerra catheter is normally located in the bladder.  There are now high attenuation blood products in the bladder measuring up to 7 cm maximally on the sagittal images.  The bladder remains thickened which is favored to be related to cystitis, but there is prostatomegaly, so hypertrophy is possible. There is persistent bilateral hydronephrosis which is moderate on the left and mild-to-moderate on the right.  Left renal atrophy and mild right cortical thinning suggests that this may be chronic. 2. No acute findings elsewhere in the abdomen or pelvis. 3. Colonic diverticulosis. 4. Small hiatal hernia.            Electronically signed by: Joni Miller PA-C  12/18/2024   [facility]  Office Contact: [phone]

## 2024-12-18 NOTE — PROGRESS NOTES
Pt arrived from PACU alert and oriented. Guerra in place. CBI clamped d/t clear urine, no blood noted.

## 2024-12-18 NOTE — CARE COORDINATION
12/18/24 0919   Readmission Assessment   Number of Days since last admission? 8-30 days   Previous Disposition Home with Family   Who is being Interviewed Patient   What was the patient's/caregiver's perception as to why they think they needed to return back to the hospital? Other (Comment)  (Patient returned to the hospital due to complaints of blood in his catheter)   Did you visit your Primary Care Physician after you left the hospital, before you returned this time? No   Why weren't you able to visit your PCP? Did not have an appointment   Did you see a specialist, such as Cardiac, Pulmonary, Orthopedic Physician, etc. after you left the hospital? Yes   Who advised the patient to return to the hospital? Self-referral   Does the patient report anything that got in the way of taking their medications? No   In our efforts to provide the best possible care to you and others like you, can you think of anything that we could have done to help you after you left the hospital the first time, so that you might not have needed to return so soon? Other (Comment)  (N/A)

## 2024-12-18 NOTE — H&P
Enlarged prostate    Indwelling catheter present on admission  Resolved Problems:    * No resolved hospital problems. *        Assessment/Plan:   Gross hematuria  Admit to inpatient  NPO  IVF  Urology consult  Needs cystoscopy  Follow H/H  Transfuse PRBC if Hg <  7  CKD 4  Baseline Cr 2.3-2.6  HTN  Continue Coreg and Nifedipine  Bipolar disorder  Continue Zyprexa  Obesity  BMI is 32      DVT prophylaxis SCD  Code status Full code  Diet NPO  IV access Peripheral  Guerra Catheter Yes (POA)    Admit as inpatient. I anticipate hospitalization spanning more than two midnights for investigation and treatment of the above medically necessary diagnoses.    Discussed with patient.  Discussed with Alton Miller (Urology PA).  Cysto today.    Alexey Benson MD    12/18/2024 12:04 PM

## 2024-12-18 NOTE — PROGRESS NOTES
Patient resting in position of comfort, karen blood noted and contained on draw sheet under patient as reported by floor RN.

## 2024-12-18 NOTE — PROGRESS NOTES
Pt arrived from ED. Stand and pivot from stretcher to bed. A/O x4. Gilda area not actively bleeding, gauze in place.     1000: 2 large blood clots noted. Pt stated he felt wet, bright red blood saturated the bed and pad. Urology in room, ordered bladder scan. 750 mL on scan. Pt denies any pain or discomfort during scan. Pt to remain NPO at this time. New linens and assistance w/ hygiene provided.

## 2024-12-18 NOTE — OP NOTE
Operative Note      Patient: Tye Resendez  YOB: 1950  MRN: [id number]    Date of Procedure: 12/18/2024    Pre-Op Diagnosis Codes:      * Hematuria, gross [R31.0]     * Urinary retention [R33.9]    Post-Op Diagnosis: Same       Procedure(s):  CYSTOSCOPY EVACUATION OF CLOTS  URINARY CATHETER INSERTION    Surgeon(s):  Sp Delgadillo MD    Assistant:   * No surgical staff found *    Anesthesia: General    Estimated Blood Loss (mL): Minimal    Complications: None    Specimens:   * No specimens in log *    Implants:  * No implants in log *      Drains:   Urinary Catheter 12/18/24 3 Way (Active)       [REMOVED] Urinary Catheter 12/09/24 (Removed)   Catheter Indications Urinary retention (acute or chronic), continuous bladder irrigation or bladder outlet obstruction 12/12/24 1300   Site Assessment No urethral drainage 12/12/24 1300   Urine Color Yellow 12/12/24 1300   Urine Appearance Clear 12/12/24 1300   Urine Odor Malodorous 12/11/24 1652   Collection Container Standard 12/12/24 1300   Securement Method Securing device (Describe) 12/12/24 1300   Catheter Care  Perineal wipes 12/12/24 0342   Catheter Best Practices  Drainage tube clipped to bed;Catheter secured to thigh;Bag below bladder;Bag not on floor;Tamper seal intact 12/12/24 1300   Status Draining 12/12/24 1300   Output (mL) 1000 mL 12/12/24 0342       Findings:  Infection Present At Time Of Surgery (PATOS) (choose all levels that have infection present):  No infection present  Other Findings: clot removed   no active bleeding noted    Detailed Description of Procedure:   Note dicated    Electronically signed by Sp Delgadillo MD on 12/18/2024 at 5:26 PM

## 2024-12-18 NOTE — ANESTHESIA POSTPROCEDURE EVALUATION
Department of Anesthesiology  Postprocedure Note    Patient: Tye Resendez  MRN: [id number]  YOB: 1950  Date of evaluation: 12/18/2024    Procedure Summary       Date: 12/18/24 Room / Location: [address]    Anesthesia Start: 1645 Anesthesia Stop: 1732    Procedures:       CYSTOSCOPY EVACUATION OF CLOTS (Bladder)      URINARY CATHETER INSERTION (Bladder) Diagnosis:       Hematuria, gross      Urinary retention      (Hematuria, gross [R31.0])      (Urinary retention [R33.9])    Surgeons: Sp Delgadillo MD Responsible Provider: Prosper Saez MD    Anesthesia Type: general ASA Status: 3            Anesthesia Type: No value filed.    Deb Phase I: Deb Score: 9    Deb Phase II:      Anesthesia Post Evaluation    Patient location during evaluation: PACU  Patient participation: complete - patient participated  Level of consciousness: awake and alert  Pain score: 2  Airway patency: patent  Nausea & Vomiting: no vomiting  Cardiovascular status: blood pressure returned to baseline  Respiratory status: acceptable  Hydration status: euvolemic  Multimodal analgesia pain management approach  Pain management: adequate    No notable events documented.

## 2024-12-18 NOTE — ED PROVIDER NOTES
EMERGENCY DEPARTMENT PROVIDER NOTE         PATIENT IDENTIFICATION     Name:   Tye Resendez  MRN:   [id number]  YOB: 1950  Date of Evaluation:   12/18/2024  Provider:   Tye Iniguez DO  PCP:   Chris Persaud MD        CHIEF COMPLAINT       Urinary Catheter (Pt arrived [place] ems from [facility] living with complaints of blood in his catheter. Pt states that he began to bleed after they changed out his kinney. Pt has no other complaints/)        HISTORY OF PRESENT ILLNESS     Tye Resendez is a(n) 74 y.o. male with past medical history as below including BPH and CKD  who arrives via EMS for blood from Kinney catheter.  Patient states that he has a history of self-catheterization, but had a Kinney catheter placed about 4 days ago at this location.  EMS reports the patient had his Kinney catheter changed yesterday, and ever since he has had significant amount of bleeding both in the bag as well as around the tubing.  The patient denies penile pain, trauma, fever, chills, recent illness, headache, rash, chest pain, shortness of breath, cough, abdominal pain, nausea, vomiting, change in bowel movements.  He denies history of similar symptoms.  He states he has not taken any medications for his current symptoms.    I personally reviewed the following nurse documentation:  Past Medical History:   Diagnosis Date    BPH with urinary obstruction     CKD (chronic kidney disease), stage IV (HCC)     Enlarged prostate     Kinney catheter in place     patient reports has a urinary catheter for 2 years: replcaed monthly last was Decemeber 1 2024    Hydronephrosis     Hyperlipidemia     Hypertension     Schizoaffective disorder, bipolar type (HCC)     Schizophrenia (HCC)      Prior to Admission medications    Medication Sig Start Date End Date Taking? Authorizing Provider   aspirin 81 MG EC tablet Take 1 tablet by mouth daily 12/25/24   Walker Senior MD   OLANZapine (ZYPREXA) 5 MG tablet Take 1

## 2024-12-18 NOTE — ED NOTES
Patient Name: Tye Resendez  : 1950 74 y.o.  MRN: [id number]  ED Room #: ED-0006/06     Chief complaint:   Chief Complaint   Patient presents with    Urinary Catheter     Pt arrived [place] ems from [facility] with complaints of blood in his catheter. Pt states that he began to bleed after they changed out his kinney. Pt has no other complaints       Hospital Problem/Diagnosis:   Hospital Problems             Last Modified POA    * (Principal) Hematuria 2024 Yes         O2 Flow Rate:    (if applicable)  Cardiac Rhythm:   (if applicable)  Active LDA's:           How does patient ambulate? Contact Guard    2. How does patient take pills?  Unknown not assessed in the ed    3. Is patient alert? Alert    4. Is patient oriented? To Person, To Place, To Time, To Situation, and Follows Commands    5.   Patient arrived from:  assisted living   Facility Name: ___________________________________________    6. If patient is disoriented or from a Skill Nursing Facility has family been notified of admission?       7. Patient belongings? Belongings: Clothing    Disposition of belongings? Kept with Patient     8. Any specific patient or family belongings/needs/dynamics?   a. None     9. Miscellaneous comments/pending orders?  a. Kinney not put back in per MD [place]       If there are any additional questions please reach out to the Emergency Department.

## 2024-12-19 LAB
ALBUMIN SERPL-MCNC: 3.5 G/DL (ref 3.4–5)
ALBUMIN/GLOB SERPL: 1.3 {RATIO} (ref 1.1–2.2)
ALP SERPL-CCNC: 89 U/L (ref 40–129)
ALT SERPL-CCNC: 10 U/L (ref 10–40)
ANION GAP SERPL CALCULATED.3IONS-SCNC: 13 MMOL/L (ref 3–16)
AST SERPL-CCNC: 15 U/L (ref 15–37)
BASOPHILS # BLD: 0.1 K/UL (ref 0–0.2)
BASOPHILS NFR BLD: 0.7 %
BILIRUB SERPL-MCNC: 0.3 MG/DL (ref 0–1)
BUN SERPL-MCNC: 37 MG/DL (ref 7–20)
CALCIUM SERPL-MCNC: 8.7 MG/DL (ref 8.3–10.6)
CHLORIDE SERPL-SCNC: 107 MMOL/L (ref 99–110)
CO2 SERPL-SCNC: 20 MMOL/L (ref 21–32)
CREAT SERPL-MCNC: 3 MG/DL (ref 0.8–1.3)
DEPRECATED RDW RBC AUTO: 16.4 % (ref 12.4–15.4)
EOSINOPHIL # BLD: 0.1 K/UL (ref 0–0.6)
EOSINOPHIL NFR BLD: 1.5 %
GFR SERPLBLD CREATININE-BSD FMLA CKD-EPI: 21 ML/MIN/{1.73_M2}
GLUCOSE BLD-MCNC: 133 MG/DL (ref 70–99)
GLUCOSE SERPL-MCNC: 102 MG/DL (ref 70–99)
HCT VFR BLD AUTO: 28.8 % (ref 40.5–52.5)
HCT VFR BLD AUTO: 31.5 % (ref 40.5–52.5)
HCT VFR BLD AUTO: 31.9 % (ref 40.5–52.5)
HCT VFR BLD AUTO: 32.1 % (ref 40.5–52.5)
HGB BLD-MCNC: 10.5 G/DL (ref 13.5–17.5)
HGB BLD-MCNC: 10.5 G/DL (ref 13.5–17.5)
HGB BLD-MCNC: 10.6 G/DL (ref 13.5–17.5)
HGB BLD-MCNC: 9.5 G/DL (ref 13.5–17.5)
LYMPHOCYTES # BLD: 2.1 K/UL (ref 1–5.1)
LYMPHOCYTES NFR BLD: 24.8 %
MCH RBC QN AUTO: 29.4 PG (ref 26–34)
MCHC RBC AUTO-ENTMCNC: 32.9 G/DL (ref 31–36)
MCV RBC AUTO: 89.2 FL (ref 80–100)
MONOCYTES # BLD: 0.8 K/UL (ref 0–1.3)
MONOCYTES NFR BLD: 9.5 %
NEUTROPHILS # BLD: 5.5 K/UL (ref 1.7–7.7)
NEUTROPHILS NFR BLD: 63.5 %
PERFORMED ON: ABNORMAL
PLATELET # BLD AUTO: 276 K/UL (ref 135–450)
PMV BLD AUTO: 7.1 FL (ref 5–10.5)
POTASSIUM SERPL-SCNC: 4.6 MMOL/L (ref 3.5–5.1)
PROT SERPL-MCNC: 6.3 G/DL (ref 6.4–8.2)
RBC # BLD AUTO: 3.57 M/UL (ref 4.2–5.9)
REASON FOR REJECTION: NORMAL
REJECTED TEST: NORMAL
SODIUM SERPL-SCNC: 140 MMOL/L (ref 136–145)
WBC # BLD AUTO: 8.7 K/UL (ref 4–11)

## 2024-12-19 PROCEDURE — 80053 COMPREHEN METABOLIC PANEL: CPT

## 2024-12-19 PROCEDURE — 85025 COMPLETE CBC W/AUTO DIFF WBC: CPT

## 2024-12-19 PROCEDURE — 6370000000 HC RX 637 (ALT 250 FOR IP): Performed by: INTERNAL MEDICINE

## 2024-12-19 PROCEDURE — 2500000003 HC RX 250 WO HCPCS: Performed by: UROLOGY

## 2024-12-19 PROCEDURE — 36415 COLL VENOUS BLD VENIPUNCTURE: CPT

## 2024-12-19 PROCEDURE — 6370000000 HC RX 637 (ALT 250 FOR IP): Performed by: UROLOGY

## 2024-12-19 PROCEDURE — 85018 HEMOGLOBIN: CPT

## 2024-12-19 PROCEDURE — 1200000000 HC SEMI PRIVATE

## 2024-12-19 PROCEDURE — 85014 HEMATOCRIT: CPT

## 2024-12-19 RX ORDER — POLYETHYLENE GLYCOL 3350 17 G/17G
17 POWDER, FOR SOLUTION ORAL 2 TIMES DAILY
Status: DISCONTINUED | OUTPATIENT
Start: 2024-12-19 | End: 2024-12-20 | Stop reason: HOSPADM

## 2024-12-19 RX ADMIN — NIFEDIPINE 90 MG: 30 TABLET, EXTENDED RELEASE ORAL at 10:45

## 2024-12-19 RX ADMIN — Medication 1000 UNITS: at 10:44

## 2024-12-19 RX ADMIN — CARVEDILOL 6.25 MG: 6.25 TABLET, FILM COATED ORAL at 17:33

## 2024-12-19 RX ADMIN — SODIUM CHLORIDE, PRESERVATIVE FREE 10 ML: 5 INJECTION INTRAVENOUS at 10:46

## 2024-12-19 RX ADMIN — OLANZAPINE 10 MG: 5 TABLET, FILM COATED ORAL at 20:06

## 2024-12-19 RX ADMIN — OLANZAPINE 5 MG: 5 TABLET, FILM COATED ORAL at 10:44

## 2024-12-19 RX ADMIN — SODIUM CHLORIDE, PRESERVATIVE FREE 10 ML: 5 INJECTION INTRAVENOUS at 20:07

## 2024-12-19 RX ADMIN — POLYETHYLENE GLYCOL 3350 17 G: 17 POWDER, FOR SOLUTION ORAL at 20:07

## 2024-12-19 RX ADMIN — CARVEDILOL 6.25 MG: 6.25 TABLET, FILM COATED ORAL at 10:44

## 2024-12-19 RX ADMIN — Medication 240 ML: at 15:21

## 2024-12-19 RX ADMIN — POLYETHYLENE GLYCOL 3350 17 G: 17 POWDER, FOR SOLUTION ORAL at 15:15

## 2024-12-19 NOTE — PROGRESS NOTES
hydronephrosis. The left kidney is atrophic and there is mild cortical thinning on the right. GI/Bowel: No bowel dilatation or bowel wall thickening is identified.  There are colonic diverticula.  The appendix appears normal.  There is a small hiatal hernia.  The stomach is otherwise unremarkable. Pelvis: The bladder is thickened.  There is a normally located Guerra catheter.  High attenuation blood products are noted in the bladder measuring as much is 7 cm on the sagittal view.  The prostate gland is enlarged measuring up to 5.1 cm.  There is no free fluid. Peritoneum/Retroperitoneum: No evidence of lymphadenopathy.  Aorta is normal in caliber.  No fat stranding, free fluid, free air or focal fluid collection is identified. Bones/Soft Tissues: Bilateral L5 spondylolysis with associated anterolisthesis at L5-S1 appears unchanged.  No acute fracture or suspicious bone lesion is identified.     1. A Guerra catheter is normally located in the bladder.  There are now high attenuation blood products in the bladder measuring up to 7 cm maximally on the sagittal images.  The bladder remains thickened which is favored to be related to cystitis, but there is prostatomegaly, so hypertrophy is possible. There is persistent bilateral hydronephrosis which is moderate on the left and mild-to-moderate on the right.  Left renal atrophy and mild right cortical thinning suggests that this may be chronic. 2. No acute findings elsewhere in the abdomen or pelvis. 3. Colonic diverticulosis. 4. Small hiatal hernia.            Electronically signed by: Marcel Crow MD, 12/19/2024 4:28 PM  [facility]  Office Contact: [phone]

## 2024-12-19 NOTE — OP NOTE
[address] [address]                            OPERATIVE REPORT      PATIENT NAME: KENDRA GARCIA                 : 1950  MED REC NO: [id number]                      ROOM: [address] [place] [address]  ACCOUNT NO: [id number]                       ADMIT DATE: 2024  PROVIDER: Sp Delgadillo MD      DATE OF PROCEDURE:  2024    SURGEON:  Sp Delgadillo MD    PREOPERATIVE DIAGNOSIS:  Gross hematuria with clot urinary retention.    POSTOPERATIVE DIAGNOSIS:  Gross hematuria with clot urinary retention.    OPERATIVE PROCEDURE:  Cystoscopy with evacuation of blood clot with complicated Guerra catheter placement.    ANESTHESIA:  General.    BLOOD LOSS:  Minimal.    The patient has a continuous bladder irrigation to prevent any further clots and will be seen tomorrow.    PROCEDURE IN DETAIL:  The patient was brought to the operating room.  He had a general anesthetic, he had Ancef intravenously, he had pneumatic compression stockings on and in place.  He was placed in lithotomy position, prepped with Betadine and draped in a sterile fashion.  He had quite a bit of blood at the meatus.  He had a ventral erosion, presumably from chronic Guerra catheter.  The scope was placed atraumatically within the bladder.  He had quite a bit of clot within the bladder.  We then switched over to a resectoscope sheath and using [employer] evacuators we were eventually able to remove the clot after about 15-20 minutes.  Repeat cystoscopy once the clot was removed showed some diffuse erythema to the bladder wall, but no significant bleeding seen.  No obvious source of the bleeding.  No tumors, no stones.  He does have prostatic hypertrophy, but no significant bleeding from the prostatic fossa.  After thoroughly inspecting the bladder, the scope was withdrawn.  A 24-Bhutanese 3-way Guerra catheter was inserted.  The balloon was inflated.  The irrigant was clear.  The

## 2024-12-19 NOTE — PROGRESS NOTES
Hospitalist Progress Note      PCP: Chris Persaud MD    Date of Admission: 12/18/2024    Chief Complaint: Hematuria    Hospital Course:  74 y.o. male with  h/o hypertension, hyperlipidemia, bipolar disorder, BPH with chronic urinary retention with chronic Guerra catheter, CKD 4, Moderate bilateral hydroureteral nephrosis;L>R admitted with traumatic hematuria during Guerra catheter exchange who was admitted b/w 12/9 to 12/12/24 and underwent manual clot evacuation as well as bladder irrigation with eventual resolution of hematuria.  He  was discharged with Guerra catheter and follow-up with urology for discussion of using coudé catheter in the future.  Patient was on high-dose aspirin twice daily for reasons that were not very clear.  No recent hip or knee surgery, no history of DVT.  ASA was discontinued and switched to low-dose aspirin after 1 week.  Patient noted acute onset of orange urine then gross hematuria on 12/18.  Admitted as inpatient for gross hematuria.    Seen by Urology.  Underwent cysto on 12/18:  Cystoscopy with evacuation of blood clot with complicated Guerra catheter placement.     Subjective:  Patient is constipated and straining.  He denies CP, SOB, HA, dizziness, LH or syncope.  No hematuria in Guerra.       Medications:  Reviewed    Infusion Medications    sodium chloride       Scheduled Medications    milk and molasses  240 mL Rectal Once    sodium chloride flush  5-40 mL IntraVENous 2 times per day    carvedilol  6.25 mg Oral BID WC    vitamin D  1,000 Units Oral Daily    OLANZapine  5 mg Oral Daily    OLANZapine  10 mg Oral Nightly    NIFEdipine  90 mg Oral Daily     PRN Meds: sodium chloride flush, sodium chloride, ondansetron **OR** ondansetron, polyethylene glycol, acetaminophen **OR** acetaminophen, OLANZapine, hydrALAZINE      Intake/Output Summary (Last 24 hours) at 12/19/2024 1328  Last data filed at 12/19/2024 1044  Gross per 24 hour   Intake 110 ml   Output --   Net 110 ml

## 2024-12-19 NOTE — PLAN OF CARE
Problem: Discharge Planning  Goal: Discharge to home or other facility with appropriate resources  12/18/2024 2151 by Mony Aiken RN  Outcome: Progressing  12/18/2024 1711 by Beba Gauthier RN  Outcome: Progressing     Problem: Pain  Goal: Verbalizes/displays adequate comfort level or baseline comfort level  12/18/2024 2151 by Mony Aiken RN  Outcome: Progressing  12/18/2024 1711 by Beba Gauthier RN  Outcome: Progressing     Problem: Skin/Tissue Integrity  Goal: Absence of new skin breakdown  Description: 1.  Monitor for areas of redness and/or skin breakdown  2.  Assess vascular access sites hourly  3.  Every 4-6 hours minimum:  Change oxygen saturation probe site  4.  Every 4-6 hours:  If on nasal continuous positive airway pressure, respiratory therapy assess nares and determine need for appliance change or resting period.  12/18/2024 2151 by Mony Aiken RN  Outcome: Progressing  12/18/2024 1711 by Beba Gauthier RN  Outcome: Progressing

## 2024-12-20 VITALS
HEIGHT: 67 IN | BODY MASS INDEX: 32.33 KG/M2 | HEART RATE: 74 BPM | DIASTOLIC BLOOD PRESSURE: 74 MMHG | OXYGEN SATURATION: 99 % | RESPIRATION RATE: 18 BRPM | TEMPERATURE: 98.1 F | SYSTOLIC BLOOD PRESSURE: 146 MMHG | WEIGHT: 206 LBS

## 2024-12-20 LAB
ALBUMIN SERPL-MCNC: 3.4 G/DL (ref 3.4–5)
ALBUMIN/GLOB SERPL: 1.4 {RATIO} (ref 1.1–2.2)
ALP SERPL-CCNC: 86 U/L (ref 40–129)
ALT SERPL-CCNC: 6 U/L (ref 10–40)
ANION GAP SERPL CALCULATED.3IONS-SCNC: 9 MMOL/L (ref 3–16)
AST SERPL-CCNC: 13 U/L (ref 15–37)
BASOPHILS # BLD: 0.1 K/UL (ref 0–0.2)
BASOPHILS NFR BLD: 1.8 %
BILIRUB SERPL-MCNC: <0.2 MG/DL (ref 0–1)
BUN SERPL-MCNC: 38 MG/DL (ref 7–20)
CALCIUM SERPL-MCNC: 8.8 MG/DL (ref 8.3–10.6)
CHLORIDE SERPL-SCNC: 107 MMOL/L (ref 99–110)
CO2 SERPL-SCNC: 22 MMOL/L (ref 21–32)
CREAT SERPL-MCNC: 2.9 MG/DL (ref 0.8–1.3)
DEPRECATED RDW RBC AUTO: 16.4 % (ref 12.4–15.4)
EOSINOPHIL # BLD: 0.2 K/UL (ref 0–0.6)
EOSINOPHIL NFR BLD: 3.3 %
GFR SERPLBLD CREATININE-BSD FMLA CKD-EPI: 22 ML/MIN/{1.73_M2}
GLUCOSE SERPL-MCNC: 136 MG/DL (ref 70–99)
HCT VFR BLD AUTO: 28.6 % (ref 40.5–52.5)
HCT VFR BLD AUTO: 30.2 % (ref 40.5–52.5)
HCT VFR BLD AUTO: 32.6 % (ref 40.5–52.5)
HGB BLD-MCNC: 10.2 G/DL (ref 13.5–17.5)
HGB BLD-MCNC: 10.8 G/DL (ref 13.5–17.5)
HGB BLD-MCNC: 9.7 G/DL (ref 13.5–17.5)
LYMPHOCYTES # BLD: 2.3 K/UL (ref 1–5.1)
LYMPHOCYTES NFR BLD: 32 %
MCH RBC QN AUTO: 29.2 PG (ref 26–34)
MCHC RBC AUTO-ENTMCNC: 33.8 G/DL (ref 31–36)
MCV RBC AUTO: 86.6 FL (ref 80–100)
MONOCYTES # BLD: 0.6 K/UL (ref 0–1.3)
MONOCYTES NFR BLD: 8.2 %
NEUTROPHILS # BLD: 3.9 K/UL (ref 1.7–7.7)
NEUTROPHILS NFR BLD: 54.7 %
PLATELET # BLD AUTO: 259 K/UL (ref 135–450)
PMV BLD AUTO: 7 FL (ref 5–10.5)
POTASSIUM SERPL-SCNC: 3.8 MMOL/L (ref 3.5–5.1)
PROT SERPL-MCNC: 5.9 G/DL (ref 6.4–8.2)
RBC # BLD AUTO: 3.3 M/UL (ref 4.2–5.9)
SODIUM SERPL-SCNC: 138 MMOL/L (ref 136–145)
WBC # BLD AUTO: 7.1 K/UL (ref 4–11)

## 2024-12-20 PROCEDURE — 97165 OT EVAL LOW COMPLEX 30 MIN: CPT

## 2024-12-20 PROCEDURE — 80053 COMPREHEN METABOLIC PANEL: CPT

## 2024-12-20 PROCEDURE — 97161 PT EVAL LOW COMPLEX 20 MIN: CPT

## 2024-12-20 PROCEDURE — 36415 COLL VENOUS BLD VENIPUNCTURE: CPT

## 2024-12-20 PROCEDURE — 6370000000 HC RX 637 (ALT 250 FOR IP): Performed by: INTERNAL MEDICINE

## 2024-12-20 PROCEDURE — 6370000000 HC RX 637 (ALT 250 FOR IP): Performed by: UROLOGY

## 2024-12-20 PROCEDURE — 97535 SELF CARE MNGMENT TRAINING: CPT

## 2024-12-20 PROCEDURE — 85014 HEMATOCRIT: CPT

## 2024-12-20 PROCEDURE — 85025 COMPLETE CBC W/AUTO DIFF WBC: CPT

## 2024-12-20 PROCEDURE — 85018 HEMOGLOBIN: CPT

## 2024-12-20 PROCEDURE — 97530 THERAPEUTIC ACTIVITIES: CPT

## 2024-12-20 PROCEDURE — 2500000003 HC RX 250 WO HCPCS: Performed by: UROLOGY

## 2024-12-20 PROCEDURE — 97116 GAIT TRAINING THERAPY: CPT

## 2024-12-20 RX ORDER — POLYETHYLENE GLYCOL 3350 17 G/17G
17 POWDER, FOR SOLUTION ORAL 2 TIMES DAILY
Qty: 60 PACKET | Refills: 0 | Status: SHIPPED | OUTPATIENT
Start: 2024-12-20 | End: 2025-01-19

## 2024-12-20 RX ADMIN — SODIUM CHLORIDE, PRESERVATIVE FREE 10 ML: 5 INJECTION INTRAVENOUS at 10:13

## 2024-12-20 RX ADMIN — CARVEDILOL 6.25 MG: 6.25 TABLET, FILM COATED ORAL at 10:13

## 2024-12-20 RX ADMIN — POLYETHYLENE GLYCOL 3350 17 G: 17 POWDER, FOR SOLUTION ORAL at 10:13

## 2024-12-20 RX ADMIN — Medication 1000 UNITS: at 10:18

## 2024-12-20 RX ADMIN — NIFEDIPINE 90 MG: 30 TABLET, EXTENDED RELEASE ORAL at 10:18

## 2024-12-20 RX ADMIN — OLANZAPINE 5 MG: 5 TABLET, FILM COATED ORAL at 10:13

## 2024-12-20 NOTE — PLAN OF CARE
Problem: Discharge Planning  Goal: Discharge to home or other facility with appropriate resources  12/20/2024 0137 by Mony Aiken RN  Outcome: Progressing  12/19/2024 1619 by Malathi Koehler RN  Outcome: Progressing     Problem: Pain  Goal: Verbalizes/displays adequate comfort level or baseline comfort level  12/20/2024 0137 by Mony Aiken RN  Outcome: Progressing  12/19/2024 1619 by Malathi Koehler RN  Outcome: Progressing     Problem: Skin/Tissue Integrity  Goal: Absence of new skin breakdown  Description: 1.  Monitor for areas of redness and/or skin breakdown  2.  Assess vascular access sites hourly  3.  Every 4-6 hours minimum:  Change oxygen saturation probe site  4.  Every 4-6 hours:  If on nasal continuous positive airway pressure, respiratory therapy assess nares and determine need for appliance change or resting period.  12/20/2024 0137 by Mony Aiken RN  Outcome: Progressing  12/19/2024 1619 by Malathi Koehler RN  Outcome: Progressing     Problem: Safety - Adult  Goal: Free from fall injury  12/20/2024 0137 by Mony Aiken RN  Outcome: Progressing  12/19/2024 1619 by Malathi Koehler RN  Outcome: Progressing

## 2024-12-20 NOTE — CARE COORDINATION
Case Management -  Discharge Note      Patient Name: Tye Resendez                   YOB: 1950  Room: [unfilled]            Readmission Risk (Low < 19, Mod (19-27), High > 27): Readmission Risk Score: 29    Current PCP: Chris ePrsaud MD      ([facility]) Important Message from Medicare:    Has pt received appropriate compliance notices before being discharged if required: NA  Compliance doc:  [] 2nd IMM; [] Code 44 [] Moon  Date: NA    PT AM-PAC: 23 /24  OT AM-PAC: 24 /24    Patient/patient representative has been educated on the benefits of HHC as well as the possible risks of declining recommended services. Patient/patient representative has acknowledged the information provided and decided on the following discharge plan. Patient/ patient representative has been provided freedom of choice regarding service provider, supported by basic dialogue that supports the patient's individualized plan of care/goals.    [facility]   (Aka: [facility])  [address] [place] [address]  Phone: [phone]  Fax: [phone]       C agency notified of discharge:  [] Yes [] No  [x] NA    Family notified of discharge:  [x] Yes  [] No  [] NA    Facility notified of discharge:  [x] Yes  [] No  [] NA     Financial    Payor: [employer] / Plan: [employer] / Product Type: *No Product type* /     Pharmacy:  Potential assistance Purchasing Medications: No  Meds-to-Beds request: No      [id number] - [place], [place] - [address] P [phone] - F [phone]  [address] [address]  Phone: [phone] Fax: [phone]    [facility] - [place], [place] - [address] -  [phone] - F [phone]  [address] [address]  Phone: [phone] Fax: [phone]      Notes:    Additional Case Management Notes: Patent will return to [facility]. [facility] will transport the patient today at 4pm. No further needs at

## 2024-12-20 NOTE — PROGRESS NOTES
Corrective Device: wears glasses for reading  Hearing:   WFL  Observation:   General Observation:  pt on RA, kinney catheter   Posture:   WFL  Sensation:   denies numbness and tingling  ROM:   (B) UE ROM WFL  Strength:   (B) UE gross strength WFL    Therapist Clinical Decision Making (Complexity): low complexity  Clinical Presentation: stable      Subjective  General: Pt in semi fowlers position in bed upon entry and eating breakfast. Pt pleasant and agreeable to OT and PT evaluation.   Pain: 0/10  Pain Interventions: not applicable     Activities of Daily Living  Basic Activities of Daily Living  Grooming: modified independent  Grooming Comments: in stance at sink to perform shaving tasks  Toileting: dependent.    Toileting Comments: kinney catheter present  Instrumental Activities of Daily Living  No IADL completed on this date.    Functional Mobility  Bed Mobility  Supine to Sit: modified independent  Scooting: modified independent  Comments: HOB elevated   Transfers  Sit to stand transfer:Independent  Stand to sit transfer: Independent  Comments:  Functional Mobility  Functional Mobility Activity: to/from bathroom, long bout of household distance ambulation  Device Use: rolling walker  Required Assistance: supervision  Comment: good endurance and not overt LOB, some SOB noted-SpO2 WFL, pt with history of (L) knee debridement/I+D  Balance:  Static Sitting Balance: good: independent with functional balance in unsupported position  Dynamic Sitting Balance: good: independent with functional balance in unsupported position  Static Standing Balance: fair (+): maintains balance at SBA/supervision without use of UE support  Dynamic Standing Balance: fair (+): maintains balance at SBA/supervision without use of UE support  Comments:    Other Therapeutic Interventions    Access Code: [email or web address]  URL: [email or web address]  Date: 12/20/2024  Prepared by: Amara Hayes    Exercises  - Single Leg Bridge  - 1 x daily - 7 x  weekly - 3 sets - 10 reps  - Seated Punches with Trunk Rotation  - 1 x daily - 7 x weekly - 3 sets - 10 reps  - Seated Long Arc Quad  - 1 x daily - 7 x weekly - 3 sets - 10 reps  - Seated Chest Press with Dumbbells with PLB  - 1 x daily - 7 x weekly - 3 sets - 10 reps  - Seated Shoulder Abduction with Dumbbells - Thumbs Up  - 1 x daily - 7 x weekly - 3 sets - 10 reps  - Seated Overhead Press with Dumbbells  - 1 x daily - 7 x weekly - 3 sets - 10 reps  - Seated Gluteal Sets  - 1 x daily - 7 x weekly - 3 sets - 10 reps  - Seated Gluteal Sets  - 1 x daily - 7 x weekly - 3 sets - 10 reps  - Supine Gluteal Sets  - 1 x daily - 7 x weekly - 3 sets - 10 reps    Pt verbalized understanding of the above exercises.     Functional Outcomes  AM-PAC Inpatient Daily Activity Raw Score: 24    Cognition  WFL, tangential in conversation, pt reporting \"force fields\" contribute to pushing him over leading up to falls that occur in his AL.   Orientation:    A&O x 4  Command Following:   WFL     Education  Barriers To Learning: cognition  Patient Education: Patient educated on OT role and benefits, energy conservation, disease specific education, discharge recommendations  Learning Assessment:  Patient verbalized and demonstrates understanding    Assessment  Activity Tolerance: Good, VSS during session, Pt reporting slight dizziness prior to ambulation in hallway, however did not report to therapists at the time of symptoms. Pt reports symptoms subsided once seated  Impairments Requiring Therapeutic Intervention: none - eval with same day discharge  Prognosis: good without need for therapy intervention  Clinical Assessment: Patient presenting at independent level for completion of required self care tasks for return to home.  Eval with d/c at this time.  No therapy services indicated.   Safety Interventions: patient left in chair, chair alarm in place, call light within reach, gait belt, and nurse notified    Plan  Frequency: Eval with

## 2024-12-20 NOTE — DISCHARGE INSTR - COC
Continuity of Care Form    Patient Name: Tye Resendez   :  1950  MRN:  [id number]    Admit date:  2024  Discharge date:  2024    Code Status Order: Full Code   Advance Directives:   Advance Care Flowsheet Documentation             Admitting Physician:  Jurgen Thao DO  PCP: Chris Persaud MD    Discharging Nurse: Malathi GARCIA RN  Discharging Hospital Unit/Room#: 5TN-5567/5567-01  Discharging Unit Phone Number: [phone]    Emergency Contact:   Extended Emergency Contact Information  Primary Emergency Contact: Arabella Resendez  Home Phone: [phone]  Relation: Spouse  Secondary Emergency Contact: Zen Moore  Mobile Phone: [phone]  Relation: Child    Past Surgical History:  Past Surgical History:   Procedure Laterality Date    CATHETER INSERTION N/A 2024    URINARY CATHETER INSERTION performed by Sp Delgadillo MD at [facility] OR    CYSTOSCOPY N/A 2024    CYSTOSCOPY EVACUATION OF CLOTS performed by Sp Delgadillo MD at [facility] OR    KNEE SURGERY Left     LEG SURGERY Left 10/06/2024    LEFT LEG DEBRIDEMENT INCISION AND DRAINAGE performed by Edd Lorenzo MD at [facility] OR       Immunization History:   Immunization History   Administered Date(s) Administered    COVID-19, PFIZER, (age 12y+), IM, 30mcg/0.3mL 2023       Active Problems:  Patient Active Problem List   Diagnosis Code    DIMA (acute kidney injury) ([facility]) N17.9    Hematuria R31.9    Hyponatremia E87.1    Sepsis ([facility]) A41.9    Soft tissue mass M79.89    Abscess of left leg L02.416    Leg mass, left R22.42    Urinary tract infection due to Pseudomonas aeruginosa N39.0, B96.5    Bacteremia R78.81    Class 1 obesity due to excess calories with body mass index (BMI) of 30.0 to 30.9 in adult E66.811, E66.09, Z68.30    Schizoaffective disorder (HCC) F25.9    Bipolar disorder ([facility]) F31.9    Mixed hyperlipidemia E78.2    Essential hypertension I10    Bilateral hydronephrosis N13.30    Enlarged prostate N40.0    Indwelling

## 2024-12-20 NOTE — PLAN OF CARE
Problem: Discharge Planning  Goal: Discharge to home or other facility with appropriate resources  12/20/2024 1527 by Malathi Koehler RN  Outcome: Adequate for Discharge  12/20/2024 0137 by Mony Aiken RN  Outcome: Progressing     Problem: Pain  Goal: Verbalizes/displays adequate comfort level or baseline comfort level  12/20/2024 1527 by Malathi Koehler RN  Outcome: Adequate for Discharge  12/20/2024 0137 by Mony Aiken RN  Outcome: Progressing     Problem: Skin/Tissue Integrity  Goal: Absence of new skin breakdown  Description: 1.  Monitor for areas of redness and/or skin breakdown  2.  Assess vascular access sites hourly  3.  Every 4-6 hours minimum:  Change oxygen saturation probe site  4.  Every 4-6 hours:  If on nasal continuous positive airway pressure, respiratory therapy assess nares and determine need for appliance change or resting period.  12/20/2024 1527 by Malathi Koehler RN  Outcome: Adequate for Discharge  12/20/2024 0137 by Mony Aiken RN  Outcome: Progressing     Problem: Safety - Adult  Goal: Free from fall injury  12/20/2024 1527 by Malathi Koehler RN  Outcome: Adequate for Discharge  12/20/2024 0137 by Mony Aiken RN  Outcome: Progressing

## 2024-12-20 NOTE — PROGRESS NOTES
Patient discharging back to [facility]. Family here to transport. IV access removed without complication and dry dressing applied. Patient tolerated well. Discharge paperwork given to family and instructed to give to the facility. Report called to facility.

## 2024-12-20 NOTE — PROGRESS NOTES
Urology Progress Note  [facility]     Patient: Tye Resendez MRN: [id number]  Room/Bed: 5TN-5567/5567-01   YOB: 1950  Age/Sex: 74 y.o.male  Admission Date: 12/18/2024     Date of Service:  12/20/2024    ASSESSMENT/PLAN     1. Gross hematuria    2. Chronic kidney disease, unspecified CKD stage      72 yo male with hx of urinary retention managed with chronic kinney for several years. He was recently admitted a week ago for gross hematuria following cath exchange at [facility]. CT at that time showed catheter in bladder with 7 cm clot. He underwent multiple rounds of bedside irrigation and eventually his urine cleared up with no residual clot and he was discharged home on 12/12/24.   He now presents today with gross hematuria again, where he began bleeding after his kinney was again apparently exchanged one day ago. This was reported by EMS but patient tells me he cannot remember if cath was changed or not. His kinney was removed by nursing in the ED and large amount of blood followed, this was ultimately able to be controlled with direct pressure with guaze.  ==  Pt now s/p cysto clot evac 12/18/24. His kinney is draining cyu. Afvss. Cr 2.9. h/h stable.    Recommendations:  Continue kinney, draining cyu. Next kinney exchange 4 weeks. Trend H/h, cr. Patient stable today from a  standpoint. Will sign out, please reengage if any new issues arise and we will be happy to assist.     All patient questions were answered. He understands the plan as listed above.    SUBJECTIVE     Chief Complaint:   Chief Complaint   Patient presents with    Urinary Catheter     Pt arrived [facility] from [facility] with complaints of blood in his catheter. Pt states that he began to bleed after they changed out his kinney. Pt has no other complaints         24 Hour Events: No acute events     OBJECTIVE     Hospital Problem List:  Principal Problem:    Hematuria  Active Problems:

## 2024-12-20 NOTE — DISCHARGE SUMMARY
Hospital Medicine Discharge Summary    Patient: Tye Resendez     Gender: male  : 1950   Age: 74 y.o.  MRN: [id number]    Admitting Physician: Jurgen Thao DO  Discharge Physician: Alexey Benson MD     Code Status: Full Code     Admit Date: 2024   Discharge Date:   24    Disposition:  Home    Discharge Diagnoses:    Active Hospital Problems    Diagnosis Date Noted    Urinary retention [R33.9] 2024    Gross hematuria [R31.0] 2024    Bipolar disorder (HCC) [F31.9] 2024    Essential hypertension [I10] 2024    Enlarged prostate [N40.0] 2024    Class 1 obesity due to excess calories with body mass index (BMI) of 30.0 to 30.9 in adult [E66.811, E66.09, Z68.30] 2024    Schizoaffective disorder (HCC) [F25.9] 2024    Indwelling catheter present on admission [Z96.0] 2024    Hematuria [R31.9] 2024       Follow-up appointments:  one week    Outpatient to do list: F/U with PCP and Urology.  PCP to address ASA restart as OP    Condition at Discharge:  Stable    Hospital Course:   74 y.o. male with  h/o hypertension, hyperlipidemia, bipolar disorder, BPH with chronic urinary retention with chronic Guerra catheter, CKD 4, Moderate bilateral hydroureteral nephrosis;L>R admitted with traumatic hematuria during Guerra catheter exchange who was admitted b/w  to 24 and underwent manual clot evacuation as well as bladder irrigation with eventual resolution of hematuria.  He  was discharged with Guerra catheter and follow-up with urology for discussion of using coudé catheter in the future.  Patient was on high-dose aspirin twice daily for reasons that were not very clear.  No recent hip or knee surgery, no history of DVT.  ASA was discontinued and switched to low-dose aspirin after 1 week.  Patient noted acute onset of orange urine then gross hematuria on .  Admitted as inpatient for gross hematuria.    Seen by Urology.  Underwent cysto on :   Cystoscopy with evacuation of blood clot with complicated Guerra catheter placement.     ASA held on DC.  PCP to decide when to restart ASA.    Will f/u with PCP and Urology.    Discharge Medications:   Current Discharge Medication List        START taking these medications    Details   polyethylene glycol (GLYCOLAX) 17 g packet Take 1 packet by mouth 2 times daily  Qty: 60 packet, Refills: 0           Current Discharge Medication List        Current Discharge Medication List        CONTINUE these medications which have NOT CHANGED    Details   sodium bicarbonate 325 MG tablet Take 4 tablets by mouth 2 times daily      !! OLANZapine (ZYPREXA) 5 MG tablet Take 1 tablet by mouth every 8 hours as needed (Agitation.)      carvedilol (COREG) 6.25 MG tablet Take 1 tablet by mouth 2 times daily (with meals)      NIFEdipine (ADALAT CC) 90 MG extended release tablet Take 1 tablet by mouth daily Take one tablet by mouth once a day on an empty stomach.      !! OLANZapine (ZYPREXA) 10 MG tablet Take 0.5-1 tablets by mouth See Admin Instructions Take 1/2 tablet by mouth in the morning and 1 whole tablet at night.      vitamin D (CHOLECALCIFEROL) 25 MCG (1000 UT) TABS tablet Take 1 tablet by mouth daily Indications: Hyperparathyroidism       !! - Potential duplicate medications found. Please discuss with provider.        Current Discharge Medication List        STOP taking these medications       aspirin 81 MG EC tablet Comments:   Reason for Stopping:         finasteride (PROSCAR) 5 MG tablet Comments:   Reason for Stopping:         tamsulosin (FLOMAX) 0.4 MG capsule Comments:   Reason for Stopping:         melatonin 3 MG TABS tablet Comments:   Reason for Stopping:                 Discharge Exam:    BP (!) 146/74   Pulse 74   Temp 98.1 °F (36.7 °C) (Oral)   Resp 18   Ht 1.702 m (5' 7\")   Wt 93.4 kg (206 lb)   SpO2 99%   BMI 32.26 kg/m²   General appearance:  Appears comfortable. Well nourished  Eyes: Sclera clear, pupils

## 2024-12-20 NOTE — PROGRESS NOTES
[facility] - Inpatient Rehabilitation Department   Phone: [phone]    Physical Therapy    [x] Initial Evaluation            [] Daily Treatment Note         [x] Discharge Summary      Patient: Tye Resendez   : 1950   MRN: [id number]   Date of Service:  2024  Admitting Diagnosis: Hematuria  Current Admission Summary: Per H&P on  \"74 y.o. male with  h/o hypertension, hyperlipidemia, bipolar disorder, BPH with chronic urinary retention with chronic Guerra catheter, CKD 4, Moderate bilateral hydroureteral nephrosis;L>R admitted with traumatic hematuria during Guerra catheter exchange who was admitted b/w  to 24 and underwent manual clot evacuation as well as bladder irrigation with eventual resolution of hematuria.  He  was discharged with Guerra catheter and follow-up with urology for discussion of using coudé catheter in the future.  Patient was on high-dose aspirin twice daily for reasons that were not very clear.  No recent hip or knee surgery, no history of DVT.  ASA was discontinued and switched to low-dose aspirin after 1 week.  Patient noted acute onset of orange urine then gross hematuria  today. \"   - cytoscopy   Past Medical History:  has a past medical history of BPH with urinary obstruction, CKD (chronic kidney disease), stage IV (HCC), Enlarged prostate, Guerra catheter in place, Hydronephrosis, Hyperlipidemia, Hypertension, Schizoaffective disorder, bipolar type (HCC), and Schizophrenia (HCC).  Past Surgical History:  has a past surgical history that includes Leg Surgery (Left, 10/06/2024); knee surgery (Left); Cystoscopy (N/A, 2024); and Catheter Insertion (N/A, 2024).    Discharge Recommendations: Tye Resendez scored a 23/24 on the AM-PAC short mobility form.  At this time, no further PT is recommended upon discharge due to pt functioning at his baseline.  Recommend patient returns to prior setting with prior services.      DME Required For  falls are due to someone or a force pushing him from behind.   Sleeps in flat bed.     Examination   Vision:   Vision Gross Assessment: Impaired (Reports he had reading glasses but \"someone took them\"   Hearing:   WFL  Observation:   General Observation:  Pt on RA, kinney catheter in place, (L) LE with multiple scars due to hx of fusion  Posture:   Good  Sensation:   denies numbness and tingling  Proprioception:    WFL  Tone:   Normotonic  Coordination Testing:   WFL    ROM:   (R) LE WFL  (L) hip ROM WFL, knee remains in full extension due to hx of fusion, ankle DF to neutral with mobility but foot drop noted  Strength:   (R) LE grossly WFL  (L) LE ankle DF weak as foot drop noted with mobility  Therapist Clinical Decision Making (Complexity): low complexity  Clinical Presentation: stable      Subjective  General: The patient is semi-reclined in bed upon therapist arrival working on eating breakfast. The patient reports his falls at home are due to \"force fields\" pushing him forward. Pt very pleasant throughout session.    Pain: 0/10  Pain Interventions: not applicable       Functional Mobility  Bed Mobility:  Supine to Sit: modified independent  Scooting: Independent  Comments: HOB elevated.   Transfers:  Sit to stand transfer: Independent  Stand to sit transfer: Independent  Comments:  Ambulation:  Surface:level surface  Assistive Device: no device  Assistance: supervision  Distance: 10 ft + 400 ft   Gait Mechanics: Slow annemarie, (L) knee in extension due to fusion, (L) drop foot, decreased (L) stance time  Comments:  No LOB though pt using hallway rail intermittently towards end of session. The patient did have mild DUKE with ambulation but SpO2 stable.   Stair Mobility:  Stair mobility not completed on this date.  Comments:  Wheelchair Mobility:  No w/c mobility completed on this date.  Comments:  Balance:  Static Sitting Balance: good: independent with functional balance in unsupported position  Dynamic Sitting

## 2024-12-31 ENCOUNTER — OFFICE VISIT (OUTPATIENT)
Dept: ORTHOPEDIC SURGERY | Age: 74
End: 2024-12-31
Payer: MEDICARE

## 2024-12-31 VITALS — HEIGHT: 67 IN | BODY MASS INDEX: 32.33 KG/M2 | WEIGHT: 206 LBS

## 2024-12-31 DIAGNOSIS — L02.416 ABSCESS OF LEFT LEG: Primary | ICD-10-CM

## 2024-12-31 DIAGNOSIS — R22.42 LEG MASS, LEFT: ICD-10-CM

## 2024-12-31 PROCEDURE — 1126F AMNT PAIN NOTED NONE PRSNT: CPT | Performed by: ORTHOPAEDIC SURGERY

## 2024-12-31 PROCEDURE — 1159F MED LIST DOCD IN RCRD: CPT | Performed by: ORTHOPAEDIC SURGERY

## 2024-12-31 PROCEDURE — 99213 OFFICE O/P EST LOW 20 MIN: CPT | Performed by: ORTHOPAEDIC SURGERY

## 2024-12-31 PROCEDURE — 1123F ACP DISCUSS/DSCN MKR DOCD: CPT | Performed by: ORTHOPAEDIC SURGERY

## 2025-01-01 NOTE — PROGRESS NOTES
DIAGNOSIS:  Left leg deep mass and abscess, s/p excision mass and  incision and drainage.    DATE OF SURGERY:  10/6/2024.    HISTORY OF PRESENT ILLNESS:  Tye Resendez 74 y.o.  male who came in today for 3 months postoperative visit.  The patient denies any significant pain in the left leg. Rates pain a 0/10 VAS and doing better. He has been WBAT.  No numbness or tingling sensation. No fever or Chills. He grew up MSSA and on antibiotics. He has a h/o left knee fusion and chronic left foot drop since . He does not use an AFO. He has wound care nurse change dressing at home every M,W,F.     Past Medical History:   Diagnosis Date    BPH with urinary obstruction     CKD (chronic kidney disease), stage IV (HCC)     Enlarged prostate     Guerra catheter in place     patient reports has a urinary catheter for 2 years: replcaed monthly last was Decemeber 2024    Hydronephrosis     Hyperlipidemia     Hypertension     Schizoaffective disorder, bipolar type (HCC)     Schizophrenia ([facility])        Past Surgical History:   Procedure Laterality Date    CATHETER INSERTION N/A 2024    URINARY CATHETER INSERTION performed by Sp Delgadillo MD at [facility] OR    CYSTOSCOPY N/A 2024    CYSTOSCOPY EVACUATION OF CLOTS performed by Sp Delgadillo MD at [facility] OR    KNEE SURGERY Left     LEG SURGERY Left 10/06/2024    LEFT LEG DEBRIDEMENT INCISION AND DRAINAGE performed by Edd Lorenzo MD at [facility] OR       Social History     Socioeconomic History    Marital status:      Spouse name: Arabella    Number of children: 4    Years of education: 14    Highest education level: Not on file   Occupational History    Not on file   Tobacco Use    Smoking status: Former     Types: Cigarettes     Start date:      Quit date: 1960     Years since quittin.0    Smokeless tobacco: Never   Vaping Use    Vaping status: Never Used   Substance and Sexual Activity    Alcohol use: Not Currently    Drug use: No    Sexual

## 2025-01-21 ENCOUNTER — HOSPITAL ENCOUNTER (EMERGENCY)
Age: 75
Discharge: HOME | End: 2025-01-21
Attending: EMERGENCY MEDICINE
Payer: OTHER GOVERNMENT

## 2025-01-21 VITALS
TEMPERATURE: 97.7 F | OXYGEN SATURATION: 97 % | HEART RATE: 76 BPM | SYSTOLIC BLOOD PRESSURE: 126 MMHG | WEIGHT: 206 LBS | RESPIRATION RATE: 18 BRPM | DIASTOLIC BLOOD PRESSURE: 89 MMHG | BODY MASS INDEX: 32.26 KG/M2

## 2025-01-21 DIAGNOSIS — T14.8XXA OPEN WOUND: Primary | ICD-10-CM

## 2025-01-21 PROCEDURE — 99283 EMERGENCY DEPT VISIT LOW MDM: CPT

## 2025-01-21 PROCEDURE — 6370000000 HC RX 637 (ALT 250 FOR IP): Performed by: EMERGENCY MEDICINE

## 2025-01-21 RX ORDER — CEPHALEXIN 500 MG/1
500 CAPSULE ORAL 3 TIMES DAILY
Qty: 21 CAPSULE | Refills: 0 | Status: SHIPPED | OUTPATIENT
Start: 2025-01-21 | End: 2025-01-28

## 2025-01-21 RX ADMIN — CEPHALEXIN 500 MG: 250 CAPSULE ORAL at 08:40

## 2025-01-21 ASSESSMENT — PAIN - FUNCTIONAL ASSESSMENT: PAIN_FUNCTIONAL_ASSESSMENT: 0-10

## 2025-01-21 ASSESSMENT — PAIN SCALES - GENERAL: PAINLEVEL_OUTOF10: 0

## 2025-01-31 NOTE — PATIENT INSTRUCTIONS
[facility]  [address]   [place], [place] [address]  Telephone: [phone]     FAX [phone]    Discharge Instructions    Important reminders:    **If you have any signs and symptoms of illness (Cough, fever, congestion, nausea, vomiting, diarrhea, etc.) please call the wound care center prior to your appointment.    1. Increase Protein intake for optimal wound healing  2. No added salt to reduce any swelling  3. If diabetic, maintain good glucose control  4. If you smoke, smoking prohibits wound healing, we ask that you refrain from smoking.  5. When taking antibiotics take the entire prescription as ordered. Do not stop taking until medication is all gone unless otherwise instructed.   6. Exercise as tolerated.   7. Keep weight off wounds and reposition every 2 hours if applicable.  8. If wound(s) is on your lower extremity, elevate legs to the level of the heart or above for 30 minutes 4-5 times a day and/or when sitting. Avoid standing for long periods of time.   9. Do not get wounds wet in bath or shower unless otherwise instructed by your physician. If your wound is on your foot or leg, you may purchase a cast bag. Please ask at the pharmacy.      If Vascular testing is ordered, please call [phone] to schedule.    Vascular tests ordered by Wound Care Physicians may take up to 2 hours to complete. Please keep that in mind when scheduling.     If Vascular testing is scheduled, please bring supplies to replace your dressing after testing is done. The vascular department does not stock supplies.     Wound: Left Lower Leg     With each dressing change, rinse wounds with 0.9% Saline. (May use wound wash or soft contact solution. Both can be purchased at a local drug store). If unable to obtain saline, may use a gentle soap and water.    Dressing care: Apply Mupirocin, adaptic and Dry dressing, Tubi - apply every other day.. An Xray has been ordered and culture sent to

## 2025-02-03 ENCOUNTER — HOSPITAL ENCOUNTER (OUTPATIENT)
Age: 75
Discharge: HOME | End: 2025-02-03
Payer: MEDICARE

## 2025-02-03 ENCOUNTER — HOSPITAL ENCOUNTER (OUTPATIENT)
Dept: GENERAL RADIOLOGY | Age: 75
Discharge: HOME | End: 2025-02-03
Payer: MEDICARE

## 2025-02-03 ENCOUNTER — HOSPITAL ENCOUNTER (OUTPATIENT)
Dept: WOUND CARE | Age: 75
Discharge: HOME | End: 2025-02-03
Payer: MEDICARE

## 2025-02-03 VITALS
HEART RATE: 69 BPM | RESPIRATION RATE: 16 BRPM | SYSTOLIC BLOOD PRESSURE: 135 MMHG | DIASTOLIC BLOOD PRESSURE: 73 MMHG | TEMPERATURE: 96.4 F

## 2025-02-03 DIAGNOSIS — L97.923: ICD-10-CM

## 2025-02-03 DIAGNOSIS — T14.8XXA WOUND INFECTION: ICD-10-CM

## 2025-02-03 DIAGNOSIS — T81.89XA WOUND, SURGICAL, NONHEALING, INITIAL ENCOUNTER: ICD-10-CM

## 2025-02-03 DIAGNOSIS — L08.9 WOUND INFECTION: ICD-10-CM

## 2025-02-03 DIAGNOSIS — S81.802A OPEN WOUND OF LOWER LEG, LEFT, INITIAL ENCOUNTER: Primary | ICD-10-CM

## 2025-02-03 DIAGNOSIS — S81.802A OPEN WOUND OF LOWER LEG, LEFT, INITIAL ENCOUNTER: ICD-10-CM

## 2025-02-03 PROCEDURE — 87077 CULTURE AEROBIC IDENTIFY: CPT

## 2025-02-03 PROCEDURE — 11043 DBRDMT MUSC&/FSCA 1ST 20/<: CPT

## 2025-02-03 PROCEDURE — 99213 OFFICE O/P EST LOW 20 MIN: CPT

## 2025-02-03 PROCEDURE — 87070 CULTURE OTHR SPECIMN AEROBIC: CPT

## 2025-02-03 PROCEDURE — 73590 X-RAY EXAM OF LOWER LEG: CPT

## 2025-02-03 PROCEDURE — 87205 SMEAR GRAM STAIN: CPT

## 2025-02-03 RX ORDER — BACITRACIN ZINC AND POLYMYXIN B SULFATE 500; 1000 [USP'U]/G; [USP'U]/G
OINTMENT TOPICAL PRN
OUTPATIENT
Start: 2025-02-03

## 2025-02-03 RX ORDER — LIDOCAINE HYDROCHLORIDE 20 MG/ML
JELLY TOPICAL PRN
OUTPATIENT
Start: 2025-02-03

## 2025-02-03 RX ORDER — MUPIROCIN 20 MG/G
OINTMENT TOPICAL PRN
OUTPATIENT
Start: 2025-02-03

## 2025-02-03 RX ORDER — CLOBETASOL PROPIONATE 0.5 MG/G
OINTMENT TOPICAL PRN
OUTPATIENT
Start: 2025-02-03

## 2025-02-03 RX ORDER — TRIAMCINOLONE ACETONIDE 1 MG/G
OINTMENT TOPICAL PRN
OUTPATIENT
Start: 2025-02-03

## 2025-02-03 RX ORDER — GENTAMICIN SULFATE 1 MG/G
OINTMENT TOPICAL PRN
OUTPATIENT
Start: 2025-02-03

## 2025-02-03 RX ORDER — SILVER SULFADIAZINE 10 MG/G
CREAM TOPICAL PRN
OUTPATIENT
Start: 2025-02-03

## 2025-02-03 RX ORDER — LIDOCAINE 50 MG/G
OINTMENT TOPICAL PRN
OUTPATIENT
Start: 2025-02-03

## 2025-02-03 RX ORDER — BETAMETHASONE DIPROPIONATE 0.5 MG/G
CREAM TOPICAL PRN
OUTPATIENT
Start: 2025-02-03

## 2025-02-03 RX ORDER — GINSENG 100 MG
CAPSULE ORAL PRN
OUTPATIENT
Start: 2025-02-03

## 2025-02-03 RX ORDER — LIDOCAINE 40 MG/G
CREAM TOPICAL PRN
OUTPATIENT
Start: 2025-02-03

## 2025-02-03 RX ORDER — MUPIROCIN 20 MG/G
OINTMENT TOPICAL
Qty: 30 G | Refills: 0 | Status: SHIPPED | OUTPATIENT
Start: 2025-02-03 | End: 2025-03-03

## 2025-02-03 RX ORDER — SODIUM CHLOR/HYPOCHLOROUS ACID 0.033 %
SOLUTION, IRRIGATION IRRIGATION PRN
OUTPATIENT
Start: 2025-02-03

## 2025-02-03 RX ORDER — NEOMYCIN/BACITRACIN/POLYMYXINB 3.5-400-5K
OINTMENT (GRAM) TOPICAL PRN
OUTPATIENT
Start: 2025-02-03

## 2025-02-03 RX ORDER — LIDOCAINE HYDROCHLORIDE 40 MG/ML
SOLUTION TOPICAL PRN
OUTPATIENT
Start: 2025-02-03

## 2025-02-03 RX ORDER — MUPIROCIN 20 MG/G
OINTMENT TOPICAL
Qty: 30 G | Refills: 0 | Status: SHIPPED | OUTPATIENT
Start: 2025-02-03 | End: 2025-02-03 | Stop reason: ALTCHOICE

## 2025-02-03 NOTE — PROGRESS NOTES
[facility]     Note Type: Medical Staff Progress Note    Referring Provider: Edd Lorenzo MD  Reason for Referral: Left lower leg patient presents    Tye Resendez  MEDICAL RECORD NUMBER:  [id number]  AGE: 74 y.o.   GENDER: male  : 1950  EPISODE DATE:  2/3/2025    Chief complaint and reason for visit:     Chief Complaint   Patient presents with    Wound Check     LLE, RLE        HPI/Wound Narrative:      Tye Resendez is a 74 y.o. male who presents today for an evaluation of a wound/ulcer. Wound duration:  5 month(s).  Patient presents for returning wound care visit referred by Dr. Martinez for left lower leg nonhealing surgical wound since 10/6/2024.  Patient lives in assisted living at [facility] with his wife.  History of chronic kidney disease stage IV, schizophrenia, hypertension, and hyperlipidemia.  Patient unsure of current treatment to left lower extremity.  Patient denies pain, fever, chills and/or nausea/vomiting.    Wound/Ulcer Pain Timing/Severity: none  Quality of pain: N/A  Severity:  0 / 10   Modifying Factors: None  Associated Signs/Symptoms: none    Medical Decision Making:     Historian(s): patient .     Ulcer Identification:  Ulcer Type: non-healing surgical    Contributing Factors: edema, venous stasis, decreased mobility, obesity, and non-adherence    Acute Wound: N/A not an acute wound    Comorbid conditions affecting wound healing: As noted in PMH and PSH which was reviewed.  Pertinent labs reviewed.   Review of medical records and external note (s) from other providers was done for this visit.    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

## 2025-02-03 NOTE — PLAN OF CARE
Discharge instructions given.  Patient verbalized understanding.  Return to [facility] in 1 week(s).  Called/faxed orders to  Lab for Culture,  [facility] -Mupirocin,

## 2025-02-03 NOTE — PROGRESS NOTES
[facility] Fax # [phone]        Patient Instructions     [facility]  [address]   [place], [place] [address]  Telephone: [phone]     FAX [phone]    Discharge Instructions    Important reminders:    **If you have any signs and symptoms of illness (Cough, fever, congestion, nausea, vomiting, diarrhea, etc.) please call the wound care center prior to your appointment.    1. Increase Protein intake for optimal wound healing  2. No added salt to reduce any swelling  3. If diabetic, maintain good glucose control  4. If you smoke, smoking prohibits wound healing, we ask that you refrain from smoking.  5. When taking antibiotics take the entire prescription as ordered. Do not stop taking until medication is all gone unless otherwise instructed.   6. Exercise as tolerated.   7. Keep weight off wounds and reposition every 2 hours if applicable.  8. If wound(s) is on your lower extremity, elevate legs to the level of the heart or above for 30 minutes 4-5 times a day and/or when sitting. Avoid standing for long periods of time.   9. Do not get wounds wet in bath or shower unless otherwise instructed by your physician. If your wound is on your foot or leg, you may purchase a cast bag. Please ask at the pharmacy.      If Vascular testing is ordered, please call [phone] to schedule.    Vascular tests ordered by Wound Care Physicians may take up to 2 hours to complete. Please keep that in mind when scheduling.     If Vascular testing is scheduled, please bring supplies to replace your dressing after testing is done. The vascular department does not stock supplies.     Wound: Left Lower Leg     With each dressing change, rinse wounds with 0.9% Saline. (May use wound wash or soft contact solution. Both can be purchased at a local drug store). If unable to obtain saline, may use a gentle soap and water.    Dressing care: Apply Mupirocin, adaptic and Dry dressing, Tubi - apply

## 2025-02-03 NOTE — PROGRESS NOTES
Wound Care Supplies      Supply Company:     [employer] [address] [place] [address] f: [phone] f: [phone] p: [phone] [email or web address]                                                                                                                                                                                                                                                                                                                                                                                                 Ordering Center:    [facility]  [address]  [facility] [address]  [phone]  Dept: [phone]   Fax# [phone]    Patient Demographics:     Tye Garcia  [address]  [facility]  [place] [address]   [phone]   : 1950  AGE: 74 y.o.     GENDER: male   PATIENT EMAIL: [email or web address]  TODAYS DATE:  2/3/2025      Insurance Information:     PRIMARY INSURANCE:  Plan: ANTHBrightergyBS [place] MEDICARE  Coverage: [place] BC[place]  Effective Date: 2024  Group Number: [unfilled]  Subscriber Number: [id number] - (Medicare Managed)    Payer/Plan Subscr  Sex Relation Sub. Ins. ID Effective Group Num   1. [place] MEDIC* TYE GARCIA 1950 Male Self [id number]                                   [address]   2. [employer] -* TYE GARCIA 1950 Male Self [id number]* 2000                                    [address]         Wound Information:    Additional ICD-10 Codes:     Patient Active Problem List   Diagnosis Code    DIMA (acute kidney injury) ([facility]) N17.9    Hematuria R31.9    Hyponatremia E87.1    Sepsis (HCC) A41.9    Soft tissue mass M79.89    Abscess of left leg L02.416    Leg mass, left R22.42    Urinary tract infection due to Pseudomonas aeruginosa N39.0, B96.5    Bacteremia R78.81    Class 1 obesity due to excess calories with body mass index (BMI) of 30.0 to 30.9 in adult E66.811, E66.09,

## 2025-02-05 LAB
BACTERIA SPEC AEROBE CULT: ABNORMAL
GRAM STN SPEC: ABNORMAL
ORGANISM: ABNORMAL

## 2025-02-07 NOTE — PATIENT INSTRUCTIONS
[facility]  [address]   [place], [place] [address]  Telephone: [phone]     FAX [phone]    Discharge Instructions    Important reminders:    **If you have any signs and symptoms of illness (Cough, fever, congestion, nausea, vomiting, diarrhea, etc.) please call the wound care center prior to your appointment.    1. Increase Protein intake for optimal wound healing  2. No added salt to reduce any swelling  3. If diabetic, maintain good glucose control  4. If you smoke, smoking prohibits wound healing, we ask that you refrain from smoking.  5. When taking antibiotics take the entire prescription as ordered. Do not stop taking until medication is all gone unless otherwise instructed.   6. Exercise as tolerated.   7. Keep weight off wounds and reposition every 2 hours if applicable.  8. If wound(s) is on your lower extremity, elevate legs to the level of the heart or above for 30 minutes 4-5 times a day and/or when sitting. Avoid standing for long periods of time.   9. Do not get wounds wet in bath or shower unless otherwise instructed by your physician. If your wound is on your foot or leg, you may purchase a cast bag. Please ask at the pharmacy.      If Vascular testing is ordered, please call [phone] to schedule.    Vascular tests ordered by Wound Care Physicians may take up to 2 hours to complete. Please keep that in mind when scheduling.     If Vascular testing is scheduled, please bring supplies to replace your dressing after testing is done. The vascular department does not stock supplies.     Wound: Left Lower Leg     With each dressing change, rinse wounds with 0.9% Saline. (May use wound wash or soft contact solution. Both can be purchased at a local drug store). If unable to obtain saline, may use a gentle soap and water.    Dressing care: Apply Mupirocin, adaptic and Dry dressing, Tubi - apply and change every other day..      Home Care Agency/Facility: [facility]

## 2025-02-10 ENCOUNTER — HOSPITAL ENCOUNTER (OUTPATIENT)
Dept: WOUND CARE | Age: 75
Discharge: HOME | End: 2025-02-10
Payer: MEDICARE

## 2025-02-10 VITALS — DIASTOLIC BLOOD PRESSURE: 82 MMHG | RESPIRATION RATE: 15 BRPM | HEART RATE: 80 BPM | SYSTOLIC BLOOD PRESSURE: 128 MMHG

## 2025-02-10 DIAGNOSIS — S81.802A OPEN WOUND OF LOWER LEG, LEFT, INITIAL ENCOUNTER: Primary | ICD-10-CM

## 2025-02-10 DIAGNOSIS — L97.923: ICD-10-CM

## 2025-02-10 DIAGNOSIS — T81.89XA WOUND, SURGICAL, NONHEALING, INITIAL ENCOUNTER: ICD-10-CM

## 2025-02-10 PROCEDURE — 11043 DBRDMT MUSC&/FSCA 1ST 20/<: CPT

## 2025-02-10 RX ORDER — LIDOCAINE 40 MG/G
CREAM TOPICAL PRN
Status: DISCONTINUED | OUTPATIENT
Start: 2025-02-10 | End: 2025-02-11 | Stop reason: HOSPADM

## 2025-02-10 RX ORDER — LIDOCAINE HYDROCHLORIDE 40 MG/ML
SOLUTION TOPICAL PRN
OUTPATIENT
Start: 2025-02-10

## 2025-02-10 RX ORDER — GINSENG 100 MG
CAPSULE ORAL PRN
OUTPATIENT
Start: 2025-02-10

## 2025-02-10 RX ORDER — TRIAMCINOLONE ACETONIDE 1 MG/G
OINTMENT TOPICAL PRN
OUTPATIENT
Start: 2025-02-10

## 2025-02-10 RX ORDER — LIDOCAINE HYDROCHLORIDE 20 MG/ML
JELLY TOPICAL PRN
OUTPATIENT
Start: 2025-02-10

## 2025-02-10 RX ORDER — CLOBETASOL PROPIONATE 0.5 MG/G
OINTMENT TOPICAL PRN
OUTPATIENT
Start: 2025-02-10

## 2025-02-10 RX ORDER — LIDOCAINE 50 MG/G
OINTMENT TOPICAL PRN
OUTPATIENT
Start: 2025-02-10

## 2025-02-10 RX ORDER — BACITRACIN ZINC AND POLYMYXIN B SULFATE 500; 1000 [USP'U]/G; [USP'U]/G
OINTMENT TOPICAL PRN
OUTPATIENT
Start: 2025-02-10

## 2025-02-10 RX ORDER — MUPIROCIN 20 MG/G
OINTMENT TOPICAL PRN
OUTPATIENT
Start: 2025-02-10

## 2025-02-10 RX ORDER — SILVER SULFADIAZINE 10 MG/G
CREAM TOPICAL PRN
OUTPATIENT
Start: 2025-02-10

## 2025-02-10 RX ORDER — BETAMETHASONE DIPROPIONATE 0.5 MG/G
CREAM TOPICAL PRN
OUTPATIENT
Start: 2025-02-10

## 2025-02-10 RX ORDER — LIDOCAINE 40 MG/G
CREAM TOPICAL PRN
OUTPATIENT
Start: 2025-02-10

## 2025-02-10 RX ORDER — SODIUM CHLOR/HYPOCHLOROUS ACID 0.033 %
SOLUTION, IRRIGATION IRRIGATION PRN
OUTPATIENT
Start: 2025-02-10

## 2025-02-10 RX ORDER — NEOMYCIN/BACITRACIN/POLYMYXINB 3.5-400-5K
OINTMENT (GRAM) TOPICAL PRN
OUTPATIENT
Start: 2025-02-10

## 2025-02-10 RX ORDER — GENTAMICIN SULFATE 1 MG/G
OINTMENT TOPICAL PRN
OUTPATIENT
Start: 2025-02-10

## 2025-02-10 NOTE — PROGRESS NOTES
[facility]     Note Type: Medical Staff Progress Note    Referring Provider: Edd Lorenzo MD  Reason for Referral: Left lower leg patient presents    Tye Resendez  MEDICAL RECORD NUMBER:  [id number]  AGE: 74 y.o.   GENDER: male  : 1950  EPISODE DATE:  2/10/2025    Chief complaint and reason for visit:     Chief Complaint   Patient presents with    Wound Check     Follow up        HPI/Wound Narrative:      Tye Resendez is a 74 y.o. male who presents today for an evaluation of a wound/ulcer. Wound duration:  5 month(s).  Patient presents for returning wound care visit referred by Dr. Martinez for left lower leg nonhealing surgical wound since 10/6/2024.  Patient lives in assisted living at [facility] with his wife.  History of chronic kidney disease stage IV, schizophrenia, hypertension, and hyperlipidemia.  Patient unsure of current treatment to left lower extremity.  Patient denies pain, fever, chills and/or nausea/vomiting.    Wound/Ulcer Pain Timing/Severity: none  Quality of pain: N/A  Severity:  0 / 10   Modifying Factors: None  Associated Signs/Symptoms: none    2/10/2025: X-ray reviewed.  No osteomyelitis noted.  Patient states home care did not start care and dressing has been left in place since last visit.  Discussed with family.  Order sent for [facility] home care to assist with dressing changes.  Continue current treatment with mupirocin ointment.  Follow-up in 1 week.    Medical Decision Making:     Historian(s): patient .     Ulcer Identification:  Ulcer Type: non-healing surgical    Contributing Factors: edema, venous stasis, decreased mobility, obesity, and non-adherence    Acute Wound: N/A not an acute wound    Comorbid conditions affecting wound healing: As noted in PMH and PSH which was reviewed.  Pertinent labs reviewed.   Review of medical records and external note (s) from other providers was done for this visit.    Problem List Items 
change dressings as needed as instructed when Home Care unavailable.    WOUNDS REQUIRING DRESSING Changes:     Wound 02/03/25 Leg Left #1 (Active)   Wound Image   02/03/25 0854   Wound Etiology Other 02/10/25 0800   Wound Cleansed Cleansed with saline 02/10/25 0800   Wound Length (cm) 1.8 cm 02/10/25 0800   Wound Width (cm) 1.3 cm 02/10/25 0800   Wound Depth (cm) 0.1 cm 02/10/25 0800   Wound Surface Area (cm^2) 2.34 cm^2 02/10/25 0800   Wound Volume (cm^3) 0.234 cm^3 02/10/25 0800   Post-Procedure Length (cm) 1.8 cm 02/10/25 0812   Post-Procedure Width (cm) 1.3 cm 02/10/25 0812   Post-Procedure Depth (cm) 0.15 cm 02/10/25 0812   Post-Procedure Surface Area (cm^2) 2.34 cm^2 02/10/25 0812   Post-Procedure Volume (cm^3) 0.351 cm^3 02/10/25 0812   Wound Assessment Bleeding 02/10/25 0812   Drainage Amount Moderate (25-50%) 02/10/25 0812   Drainage Description Serosanguinous 02/10/25 0812   Odor None 02/10/25 0800   Gilda-wound Assessment Intact 02/10/25 0800   Margins Defined edges;Attached edges 02/10/25 0800   Number of days: 7          Patient seen and treated on 2/10/2025    By Brianna Guillen NP  [id number]   (provider/NPI)

## 2025-02-10 NOTE — PLAN OF CARE
Discharge instructions given.  Patient verbalized understanding.  Return to [facility] in 1 week(s).  Called/faxed orders to  Home Care   ( Waiting for phone number of patient's Home Care)

## 2025-03-19 NOTE — CONSULTS
Thank you for letting us care for you during your recent visit at [facility]. We would love to hear about your experience with us.         We’re always looking for ways to make our patients’ experiences even better. Do you have any recommendations on ways we may improve?         I appreciate you taking the time to respond. Please be on the lookout for a survey about your recent visit from [facility] via text or email. We would greatly appreciate if you could fill that out and turn it back in. We want your voice to be heard and we value your feedback.         Thank you for choosing [facility] for your healthcare needs.       
(ZOFRAN) injection 4 mg, 4 mg, IntraVENous, Q6H PRN  melatonin tablet 3 mg, 3 mg, Oral, Nightly PRN  acetaminophen (TYLENOL) tablet 650 mg, 650 mg, Oral, Q6H PRN **OR** acetaminophen (TYLENOL) suppository 650 mg, 650 mg, Rectal, Q6H PRN  cefTRIAXone (ROCEPHIN) 1,000 mg in sterile water 10 mL IV syringe, 1,000 mg, IntraVENous, Q24H  [START ON 11/10/2024] heparin (porcine) injection 5,000 Units, 5,000 Units, SubCUTAneous, BID  Allergies:    Patient has no known allergies.   Social History:     reports that he has quit smoking. He has never used smokeless tobacco. He reports that he does not currently use alcohol. He reports that he does not use drugs.   Family History:   family history is not on file.     REVIEW OF SYSTEMS:    System review was done , pertinent positives are mentioned in the HPI    Negative fatigue  No chest pain nor palpitations  No SOB, coughing nor hemoptysis  No abdominal pain, nausea, vomiting nor diarrhea  No fever/chills  No leg swelling  No change in urine output nor gross hematuria    PHYSICAL EXAM:    Vitals:  BP (!) 178/82   Pulse (!) 106   Temp 98.6 °F (37 °C) (Oral)   Resp 20   Ht 1.727 m (5' 8\")   Wt 93.4 kg (206 lb)   SpO2 94%   BMI 31.32 kg/m²       CONSTITUTIONAL:  awake, alert, cooperative, no apparent distress, and appears stated age  EYES:  Lids and lashes normal, pupils equal, round   NECK:  Supple, symmetrical, trachea midline, no adenopathy, thyroid symmetric, not enlarged and no tenderness, skin normal  HEMATOLOGIC/LYMPHATICS:  no cervical lymphadenopathy  LUNGS:  No increased work of breathing, good air exchange, clear to auscultation bilaterally, no crackles or wheezing  CARDIOVASCULAR:  Normal apical impulse, regular rate and rhythm, normal S1 and S2  ABDOMEN:  No scars, normal bowel sounds, soft, non-distended, non-tender, no masses palpated, no hepatosplenomegaly  MUSCULOSKELETAL:  There is no redness, warmth, or swelling of the joints.   NEUROLOGIC:  Awake, alert, 
Automated exposure control, iterative reconstruction, and/or weight based adjustment of the mA/kV was utilized to reduce the radiation dose to as low as reasonably achievable. COMPARISON: CT dated 08/31/2024 HISTORY: ORDERING SYSTEM PROVIDED HISTORY: Hospital discharge follow-up TECHNOLOGIST PROVIDED HISTORY: Additional Contrast?->None Reason for exam:->follow up for hydro. Reason for Exam: Hospital discharge follow-up Z09 (ICD-10-CM); Chronic kidney disease, stage 3b ; Other hydronephrosis ; Benign prostatic hyperplasia without lower urinary tract symptoms ; Benign hypertension with stage 3b chronic kidney disease FINDINGS: Lower chest: The lung bases are clear. Liver: Evaluation limited by the absence of intravenous contrast.  There are multiple scattered low-attenuation foci are present, similar in size and distribution compared to prior examination and consistent with benign entities such as hepatic cysts or hemangiomas. Biliary system/Gallbladder: No intrahepatic or extrahepatic biliary ductal dilation. The gallbladder is decompressed. Spleen: Normal. Pancreas: Normal. Adrenals: Normal. Kidneys/Bladder: The kidneys demonstrate chronic atrophy and cortical scarring.  Interval resolution of bilateral hydronephrosis, which was secondary to reflux from a distended urinary bladder.  Relative hyperdense fluid within the urinary bladder is consistent with urine homogeneously mixed with blood products and likely sequelae from prior malpositioned Guerra catheter.  A Guerra catheter is appropriate position within the urinary bladder. Pelvic organs: Prostate is stable in appearance. GI tract: The stomach is normal in appearance. The intestines demonstrate no evidence of focal thickening or obstruction. The appendix is visualized and normal in appearance. Colonic diverticulosis is present without evidence of acute diverticulitis. Peritoneum: No evidence of ascites or peritoneal nodularity. Lymph nodes: No evidence of

## 2025-04-18 NOTE — PATIENT INSTRUCTIONS
[facility]  [address]   [place], [place] [address]  Telephone: [phone]     FAX [phone]    Discharge Instructions    Important reminders:    **If you have any signs and symptoms of illness (Cough, fever, congestion, nausea, vomiting, diarrhea, etc.) please call the wound care center prior to your appointment.    1. Increase Protein intake for optimal wound healing  2. No added salt to reduce any swelling  3. If diabetic, maintain good glucose control  4. If you smoke, smoking prohibits wound healing, we ask that you refrain from smoking.  5. When taking antibiotics take the entire prescription as ordered. Do not stop taking until medication is all gone unless otherwise instructed.   6. Exercise as tolerated.   7. Keep weight off wounds and reposition every 2 hours if applicable.  8. If wound(s) is on your lower extremity, elevate legs to the level of the heart or above for 30 minutes 4-5 times a day and/or when sitting. Avoid standing for long periods of time.   9. Do not get wounds wet in bath or shower unless otherwise instructed by your physician. If your wound is on your foot or leg, you may purchase a cast bag. Please ask at the pharmacy.      If Vascular testing is ordered, please call [phone] to schedule.    Vascular tests ordered by Wound Care Physicians may take up to 2 hours to complete. Please keep that in mind when scheduling.     If Vascular testing is scheduled, please bring supplies to replace your dressing after testing is done. The vascular department does not stock supplies.     Wound:  Left Leg     With each dressing change, rinse wounds with 0.9% Saline. (May use wound wash or soft contact solution. Both can be purchased at a local drug store). If unable to obtain saline, may use a gentle soap and water.    Dressing care: Apply Collagen and Silicone Border-Change every other day    Home Care Agency/Facility:     Your wound-care supplies will be provided

## 2025-04-21 ENCOUNTER — HOSPITAL ENCOUNTER (OUTPATIENT)
Dept: WOUND CARE | Age: 75
Discharge: HOME | End: 2025-04-21
Payer: MEDICARE

## 2025-04-21 VITALS
HEART RATE: 72 BPM | SYSTOLIC BLOOD PRESSURE: 127 MMHG | DIASTOLIC BLOOD PRESSURE: 80 MMHG | RESPIRATION RATE: 17 BRPM | TEMPERATURE: 97.9 F

## 2025-04-21 DIAGNOSIS — T81.89XA WOUND, SURGICAL, NONHEALING, INITIAL ENCOUNTER: ICD-10-CM

## 2025-04-21 DIAGNOSIS — E66.811 CLASS 1 OBESITY DUE TO EXCESS CALORIES WITH SERIOUS COMORBIDITY AND BODY MASS INDEX (BMI) OF 30.0 TO 30.9 IN ADULT: ICD-10-CM

## 2025-04-21 DIAGNOSIS — E66.09 CLASS 1 OBESITY DUE TO EXCESS CALORIES WITH SERIOUS COMORBIDITY AND BODY MASS INDEX (BMI) OF 30.0 TO 30.9 IN ADULT: ICD-10-CM

## 2025-04-21 DIAGNOSIS — L97.923: Primary | ICD-10-CM

## 2025-04-21 PROCEDURE — 11043 DBRDMT MUSC&/FSCA 1ST 20/<: CPT

## 2025-04-21 NOTE — PLAN OF CARE
Discharge instructions given.  Patient verbalized understanding.  Return to [facility] in 2 week(s).  Called/faxed orders to  [facility]

## 2025-04-21 NOTE — PROGRESS NOTES
obesity, and non-adherence    Acute Wound: N/A not an acute wound    Comorbid conditions affecting wound healing: As noted in PMH and PSH which was reviewed.  Pertinent labs reviewed.   Review of medical records and external note (s) from other providers was done for this visit.    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  Nonpressure wound.  Severity of muscle fascia exposed with exposed tendon.  Wound hyper granulated with exposed tendon.  No overt signs of infection or constitutional issues.  Debridement performed.  Silver nitrate rolled onto hypergranulating areas.  Treatment-collagen with silicone border every other day with Tubigrip  Elevate bilateral lower extremities as tolerated  Follow-up in 1 week  Peripheral Vascular Disease: No  N/A  Venous Hypertension with acute inflammation and dermatitis: Yes  Compression therapy per discharge instructions  Nutritional support: Education and counseling provided.  Protein emphasis with meals and/or protein shakes  Impaired mobility and/or Offloading needs discussed with education and counseling provided.   Pressure relief to help with skin perfusion discussed  Infectious concerns were discussed in detail with patient:   Acute signs of infection today: No, wound cx and Mupirocin ointment ordered 2/3/2025  Wound culture obtained: Yes, obtained 2/3/2025  Imaging Ordered: Yes, 2 view tib/fib left x-ray on 2/3/2025-findings show large soft tissue defect with anterior distal shin, consistent with provided history without soft tissue gas or radiographic evidence of adjacent osteomyelitis.  Diffuse soft tissue calcification, decrease from comparison, partial osseous

## 2025-05-05 ENCOUNTER — HOSPITAL ENCOUNTER (OUTPATIENT)
Dept: WOUND CARE | Age: 75
Discharge: HOME | End: 2025-05-05
Payer: MEDICARE

## 2025-05-05 VITALS — RESPIRATION RATE: 16 BRPM | DIASTOLIC BLOOD PRESSURE: 96 MMHG | HEART RATE: 65 BPM | SYSTOLIC BLOOD PRESSURE: 113 MMHG

## 2025-05-05 DIAGNOSIS — E66.09 CLASS 1 OBESITY DUE TO EXCESS CALORIES WITH SERIOUS COMORBIDITY AND BODY MASS INDEX (BMI) OF 30.0 TO 30.9 IN ADULT: ICD-10-CM

## 2025-05-05 DIAGNOSIS — E66.811 CLASS 1 OBESITY DUE TO EXCESS CALORIES WITH SERIOUS COMORBIDITY AND BODY MASS INDEX (BMI) OF 30.0 TO 30.9 IN ADULT: ICD-10-CM

## 2025-05-05 DIAGNOSIS — T81.89XA WOUND, SURGICAL, NONHEALING, INITIAL ENCOUNTER: Primary | ICD-10-CM

## 2025-05-05 DIAGNOSIS — L97.923: ICD-10-CM

## 2025-05-05 PROCEDURE — 11043 DBRDMT MUSC&/FSCA 1ST 20/<: CPT

## 2025-05-05 RX ORDER — LIDOCAINE HYDROCHLORIDE 40 MG/ML
SOLUTION TOPICAL PRN
OUTPATIENT
Start: 2025-05-05

## 2025-05-05 RX ORDER — GENTAMICIN SULFATE 1 MG/G
OINTMENT TOPICAL PRN
OUTPATIENT
Start: 2025-05-05

## 2025-05-05 RX ORDER — BACITRACIN ZINC AND POLYMYXIN B SULFATE 500; 1000 [USP'U]/G; [USP'U]/G
OINTMENT TOPICAL PRN
OUTPATIENT
Start: 2025-05-05

## 2025-05-05 RX ORDER — TRIAMCINOLONE ACETONIDE 1 MG/G
OINTMENT TOPICAL PRN
OUTPATIENT
Start: 2025-05-05

## 2025-05-05 RX ORDER — CLOBETASOL PROPIONATE 0.5 MG/G
OINTMENT TOPICAL PRN
OUTPATIENT
Start: 2025-05-05

## 2025-05-05 RX ORDER — LIDOCAINE HYDROCHLORIDE 20 MG/ML
JELLY TOPICAL PRN
OUTPATIENT
Start: 2025-05-05

## 2025-05-05 RX ORDER — SILVER SULFADIAZINE 10 MG/G
CREAM TOPICAL PRN
OUTPATIENT
Start: 2025-05-05

## 2025-05-05 RX ORDER — GINSENG 100 MG
CAPSULE ORAL PRN
OUTPATIENT
Start: 2025-05-05

## 2025-05-05 RX ORDER — BETAMETHASONE DIPROPIONATE 0.5 MG/G
CREAM TOPICAL PRN
OUTPATIENT
Start: 2025-05-05

## 2025-05-05 RX ORDER — MUPIROCIN 20 MG/G
OINTMENT TOPICAL PRN
OUTPATIENT
Start: 2025-05-05

## 2025-05-05 RX ORDER — LIDOCAINE 50 MG/G
OINTMENT TOPICAL PRN
OUTPATIENT
Start: 2025-05-05

## 2025-05-05 RX ORDER — SODIUM CHLOR/HYPOCHLOROUS ACID 0.033 %
SOLUTION, IRRIGATION IRRIGATION PRN
OUTPATIENT
Start: 2025-05-05

## 2025-05-05 RX ORDER — LIDOCAINE 40 MG/G
CREAM TOPICAL PRN
OUTPATIENT
Start: 2025-05-05

## 2025-05-05 RX ORDER — NEOMYCIN/BACITRACIN/POLYMYXINB 3.5-400-5K
OINTMENT (GRAM) TOPICAL PRN
OUTPATIENT
Start: 2025-05-05

## 2025-05-05 NOTE — PLAN OF CARE
.carepl  
Discharge instructions given.  Patient verbalized understanding.  Return to [facility] in 1 week(s).  Called/faxed orders to [facility].      
Wound Care Supplies      Supply Company:     [employer] [address] [place] [address] f: [phone] f: [phone] p: [phone] [email or web address]                                                                                                                                                                                                                                                                                                                                                                                                 Ordering Center:    [facility]  [address]  [facility] [address]  Phone - [phone]  Fax - [phone]    Patient Demographics:     Tye Garcia  [address]  [facility]  [facility] [address]   [phone]   : 1950  AGE: 74 y.o.     GENDER: male   PATIENT EMAIL: [email or web address]  TODAYS DATE:  2025      Insurance Information:     PRIMARY INSURANCE:  Plan: [place]  Coverage[place]  Effective Date: 2024  Group Number: [unfilled]  Subscriber Number: [id number] - (Medicare Managed)    Payer/Plan Subscr  Sex Relation Sub. Ins. ID Effective Group Num   1. [place] MED[place]* TYE GARCIA 1950 Male Self [id number]                                   [address]   2. [employer] -* TYE GARCIA 1950 Male Self [id number]* 1998                                    [address]         Wound Information:    Additional ICD-10 Codes:     Patient Active Problem List   Diagnosis Code    DIMA (acute kidney injury) N17.9    Hematuria R31.9    Hyponatremia E87.1    Sepsis (HCC) A41.9    Soft tissue mass M79.89    Abscess of left leg L02.416    Leg mass, left R22.42    Urinary tract infection due to Pseudomonas aeruginosa N39.0, B96.5    Bacteremia R78.81    Class 1 obesity due to excess calories with body mass index (BMI) of 30.0 to 30.9 in adult E66.811, 
day    Home Care Agency/Facility:     Your wound-care supplies will be provided by: [facility]  Please note, depending on your insurance coverage, you may have out-of-pocket expenses for these supplies. Someone from the company should call you to confirm your order and discuss those potential costs before they ship your products -- please anticipate that call. If your out-of-pocket cost could be substantial, Many companies have financial hardship programs for patients who qualify, so please ask about that if you might need a hand. If you have any questions about your supplies or your potential out-of-pocket costs, or if you need to place an order for a refill of supplies (typically monthly), please call the company directly.     Your  is Olivia    Follow up with Geo Guillen Np  In 2 week(s) in the wound care center.       Wound Care Center Information: Should you experience any significant changes in your wound(s) or have questions about your wound care, please contact the [facility] at [phone] Monday  - Thursday 8:00 am - 4:00 pm and Friday 8:00 am - 1:00pm. If you need help with your wound outside these hours and cannot wait until we are again available, contact your PCP or go to the hospital emergency room.     PLEASE NOTE: IF YOU ARE UNABLE TO OBTAIN WOUND SUPPLIES, CONTINUE TO USE THE SUPPLIES YOU HAVE AVAILABLE UNTIL YOU ARE ABLE TO REACH US. IT IS MOST IMPORTANT TO KEEP THE WOUND COVERED AT ALL TIMES.    Patient Experience    Thank you for trusting us with your care.  You may receive a survey from a company called [employer] asking for your feedback.  We would appreciate it if you took a few minutes to share your experience.  Your input is very valuable to us.          Skilled nurse to evaluate and treat for wound care. Change dressing as ordered  once a day on Monday, Wednesday, and Friday using clean technique. Patient/Family/caregiver may change

## 2025-05-05 NOTE — PROGRESS NOTES
soap and water.    Dressing care: Apply Collagen and Silicone Border-Change every other day    Home Care Agency/Facility:     Your wound-care supplies will be provided by: [facility]  Please note, depending on your insurance coverage, you may have out-of-pocket expenses for these supplies. Someone from the company should call you to confirm your order and discuss those potential costs before they ship your products -- please anticipate that call. If your out-of-pocket cost could be substantial, Many companies have financial hardship programs for patients who qualify, so please ask about that if you might need a hand. If you have any questions about your supplies or your potential out-of-pocket costs, or if you need to place an order for a refill of supplies (typically monthly), please call the company directly.     Your  is Olivia    Follow up with Geo Guillen Np  In 2 week(s) in the wound care center.       Wound Care Center Information: Should you experience any significant changes in your wound(s) or have questions about your wound care, please contact the [facility] at [phone] Monday  - Thursday 8:00 am - 4:00 pm and Friday 8:00 am - 1:00pm. If you need help with your wound outside these hours and cannot wait until we are again available, contact your PCP or go to the hospital emergency room.     PLEASE NOTE: IF YOU ARE UNABLE TO OBTAIN WOUND SUPPLIES, CONTINUE TO USE THE SUPPLIES YOU HAVE AVAILABLE UNTIL YOU ARE ABLE TO REACH US. IT IS MOST IMPORTANT TO KEEP THE WOUND COVERED AT ALL TIMES.    Patient Experience    Thank you for trusting us with your care.  You may receive a survey from a company called [employer] asking for your feedback.  We would appreciate it if you took a few minutes to share your experience.  Your input is very valuable to us.          Electronically signed by JOSE Yang CNP on 5/5/2025 at 9:05 AM

## 2025-05-05 NOTE — PATIENT INSTRUCTIONS
[facility]  [address]   [place], [place] [address]  Telephone: [phone]     FAX [phone]    Discharge Instructions    Important reminders:    **If you have any signs and symptoms of illness (Cough, fever, congestion, nausea, vomiting, diarrhea, etc.) please call the wound care center prior to your appointment.    1. Increase Protein intake for optimal wound healing  2. No added salt to reduce any swelling  3. If diabetic, maintain good glucose control  4. If you smoke, smoking prohibits wound healing, we ask that you refrain from smoking.  5. When taking antibiotics take the entire prescription as ordered. Do not stop taking until medication is all gone unless otherwise instructed.   6. Exercise as tolerated.   7. Keep weight off wounds and reposition every 2 hours if applicable.  8. If wound(s) is on your lower extremity, elevate legs to the level of the heart or above for 30 minutes 4-5 times a day and/or when sitting. Avoid standing for long periods of time.   9. Do not get wounds wet in bath or shower unless otherwise instructed by your physician. If your wound is on your foot or leg, you may purchase a cast bag. Please ask at the pharmacy.      If Vascular testing is ordered, please call [phone] to schedule.    Vascular tests ordered by Wound Care Physicians may take up to 2 hours to complete. Please keep that in mind when scheduling.     If Vascular testing is scheduled, please bring supplies to replace your dressing after testing is done. The vascular department does not stock supplies.     Wound:  Left Leg     With each dressing change, rinse wounds with 0.9% Saline. (May use wound wash or soft contact solution. Both can be purchased at a local drug store). If unable to obtain saline, may use a gentle soap and water.    Dressing care: Apply Collagen and Silicone Border-Change every other day    Home Care Agency/Facility:     Your wound-care supplies will be provided

## 2025-05-19 ENCOUNTER — HOSPITAL ENCOUNTER (OUTPATIENT)
Dept: WOUND CARE | Age: 75
Discharge: HOME | End: 2025-05-19
Payer: MEDICARE

## 2025-05-19 VITALS — DIASTOLIC BLOOD PRESSURE: 75 MMHG | SYSTOLIC BLOOD PRESSURE: 122 MMHG | RESPIRATION RATE: 20 BRPM | HEART RATE: 65 BPM

## 2025-05-19 DIAGNOSIS — L97.923: ICD-10-CM

## 2025-05-19 DIAGNOSIS — T81.89XA WOUND, SURGICAL, NONHEALING, INITIAL ENCOUNTER: Primary | ICD-10-CM

## 2025-05-19 DIAGNOSIS — E66.09 CLASS 1 OBESITY DUE TO EXCESS CALORIES WITH SERIOUS COMORBIDITY AND BODY MASS INDEX (BMI) OF 30.0 TO 30.9 IN ADULT: ICD-10-CM

## 2025-05-19 DIAGNOSIS — E66.811 CLASS 1 OBESITY DUE TO EXCESS CALORIES WITH SERIOUS COMORBIDITY AND BODY MASS INDEX (BMI) OF 30.0 TO 30.9 IN ADULT: ICD-10-CM

## 2025-05-19 PROCEDURE — 11043 DBRDMT MUSC&/FSCA 1ST 20/<: CPT

## 2025-05-19 RX ORDER — LIDOCAINE HYDROCHLORIDE 20 MG/ML
JELLY TOPICAL PRN
OUTPATIENT
Start: 2025-05-19

## 2025-05-19 RX ORDER — GINSENG 100 MG
CAPSULE ORAL PRN
OUTPATIENT
Start: 2025-05-19

## 2025-05-19 RX ORDER — BACITRACIN ZINC AND POLYMYXIN B SULFATE 500; 1000 [USP'U]/G; [USP'U]/G
OINTMENT TOPICAL PRN
OUTPATIENT
Start: 2025-05-19

## 2025-05-19 RX ORDER — SODIUM CHLOR/HYPOCHLOROUS ACID 0.033 %
SOLUTION, IRRIGATION IRRIGATION PRN
OUTPATIENT
Start: 2025-05-19

## 2025-05-19 RX ORDER — NEOMYCIN/BACITRACIN/POLYMYXINB 3.5-400-5K
OINTMENT (GRAM) TOPICAL PRN
OUTPATIENT
Start: 2025-05-19

## 2025-05-19 RX ORDER — LIDOCAINE HYDROCHLORIDE 40 MG/ML
SOLUTION TOPICAL PRN
OUTPATIENT
Start: 2025-05-19

## 2025-05-19 RX ORDER — MUPIROCIN 20 MG/G
OINTMENT TOPICAL PRN
OUTPATIENT
Start: 2025-05-19

## 2025-05-19 RX ORDER — LIDOCAINE 50 MG/G
OINTMENT TOPICAL PRN
OUTPATIENT
Start: 2025-05-19

## 2025-05-19 RX ORDER — LIDOCAINE 40 MG/G
CREAM TOPICAL PRN
Status: DISCONTINUED | OUTPATIENT
Start: 2025-05-19 | End: 2025-05-20 | Stop reason: HOSPADM

## 2025-05-19 RX ORDER — SILVER SULFADIAZINE 10 MG/G
CREAM TOPICAL PRN
OUTPATIENT
Start: 2025-05-19

## 2025-05-19 RX ORDER — BETAMETHASONE DIPROPIONATE 0.5 MG/G
CREAM TOPICAL PRN
OUTPATIENT
Start: 2025-05-19

## 2025-05-19 RX ORDER — LIDOCAINE 40 MG/G
CREAM TOPICAL PRN
OUTPATIENT
Start: 2025-05-19

## 2025-05-19 RX ORDER — GENTAMICIN SULFATE 1 MG/G
OINTMENT TOPICAL PRN
OUTPATIENT
Start: 2025-05-19

## 2025-05-19 RX ORDER — TRIAMCINOLONE ACETONIDE 1 MG/G
OINTMENT TOPICAL PRN
OUTPATIENT
Start: 2025-05-19

## 2025-05-19 RX ORDER — CLOBETASOL PROPIONATE 0.5 MG/G
OINTMENT TOPICAL PRN
OUTPATIENT
Start: 2025-05-19

## 2025-05-19 NOTE — PATIENT INSTRUCTIONS
by: [email or web address]  Please note, depending on your insurance coverage, you may have out-of-pocket expenses for these supplies. Someone from the company should call you to confirm your order and discuss those potential costs before they ship your products -- please anticipate that call. If your out-of-pocket cost could be substantial, Many companies have financial hardship programs for patients who qualify, so please ask about that if you might need a hand. If you have any questions about your supplies or your potential out-of-pocket costs, or if you need to place an order for a refill of supplies (typically monthly), please call the company directly.     Your  is Olivia    Follow up with Geo Guillen Np  In 2 week(s) in the wound care center.       Wound Care Center Information: Should you experience any significant changes in your wound(s) or have questions about your wound care, please contact the [facility] at [phone] Monday  - Thursday 8:00 am - 4:00 pm and Friday 8:00 am - 1:00pm. If you need help with your wound outside these hours and cannot wait until we are again available, contact your PCP or go to the hospital emergency room.     PLEASE NOTE: IF YOU ARE UNABLE TO OBTAIN WOUND SUPPLIES, CONTINUE TO USE THE SUPPLIES YOU HAVE AVAILABLE UNTIL YOU ARE ABLE TO REACH US. IT IS MOST IMPORTANT TO KEEP THE WOUND COVERED AT ALL TIMES.    Patient Experience    Thank you for trusting us with your care.  You may receive a survey from a company called [employer] asking for your feedback.  We would appreciate it if you took a few minutes to share your experience.  Your input is very valuable to us.

## 2025-05-19 NOTE — PROGRESS NOTES
[facility]     Note Type: Medical Staff Progress Note    Referring Provider: Edd Lorenzo MD  Reason for Referral: Left lower leg patient presents    Tye Resendez  MEDICAL RECORD NUMBER:  [id number]  AGE: 74 y.o.   GENDER: male  : 1950  EPISODE DATE:  2025    Chief complaint and reason for visit:     Chief Complaint   Patient presents with    Wound Check     Left lower extremity        HPI/Wound Narrative:      Tye Resendez is a 74 y.o. male who presents today for an evaluation of a wound/ulcer. Wound duration:  5 month(s).  Patient presents for returning wound care visit referred by Dr. Martinez for left lower leg nonhealing surgical wound since 10/6/2024.  Patient lives in assisted living at [facility] with his wife.  History of chronic kidney disease stage IV, schizophrenia, hypertension, and hyperlipidemia.  Patient unsure of current treatment to left lower extremity.  Patient denies pain, fever, chills and/or nausea/vomiting.    Wound/Ulcer Pain Timing/Severity: none  Quality of pain: N/A  Severity:  0 / 10   Modifying Factors: None  Associated Signs/Symptoms: none    2025: No issues or concerns reported    2025: Patient states his dressing has not been changed in over a week.  Wound appearance improved with smaller wound measurements.  Tendon minimally exposed.  Continue current treatment.   to call [facility] to discuss dressing change orders.    2025: Patient last seen 2/10/2025.  Patient still residing at [facility].  Wound appearance improved with smaller measurements and minimal exposure of tendon.  Treatment changed to collagen with silicone border.  Edema management with Tubigrip.  Follow-up in 2 weeks.    2/10/2025: X-ray reviewed.  No osteomyelitis noted.  Patient states home care did not start care and dressing has been left in place since last visit.  Discussed with family.  Order sent

## 2025-05-19 NOTE — PLAN OF CARE
Discharge instructions given.  Patient verbalized understanding.  Return to [facility] in 2 week(s).  Faxed orders to [facility].    
day    Home Care Agency/Facility:     Your wound-care supplies will be provided by: [facility]  Please note, depending on your insurance coverage, you may have out-of-pocket expenses for these supplies. Someone from the company should call you to confirm your order and discuss those potential costs before they ship your products -- please anticipate that call. If your out-of-pocket cost could be substantial, Many companies have financial hardship programs for patients who qualify, so please ask about that if you might need a hand. If you have any questions about your supplies or your potential out-of-pocket costs, or if you need to place an order for a refill of supplies (typically monthly), please call the company directly.     Your  is Olivia    Follow up with Geo Guillen Np  In 2 week(s) in the wound care center.       Wound Care Center Information: Should you experience any significant changes in your wound(s) or have questions about your wound care, please contact the [facility] at [phone] Monday  - Thursday 8:00 am - 4:00 pm and Friday 8:00 am - 1:00pm. If you need help with your wound outside these hours and cannot wait until we are again available, contact your PCP or go to the hospital emergency room.     PLEASE NOTE: IF YOU ARE UNABLE TO OBTAIN WOUND SUPPLIES, CONTINUE TO USE THE SUPPLIES YOU HAVE AVAILABLE UNTIL YOU ARE ABLE TO REACH US. IT IS MOST IMPORTANT TO KEEP THE WOUND COVERED AT ALL TIMES.    Patient Experience    Thank you for trusting us with your care.  You may receive a survey from a company called [employer] asking for your feedback.  We would appreciate it if you took a few minutes to share your experience.  Your input is very valuable to us.        Skilled nurse to evaluate and treat for wound care. Change dressing as ordered  once a day on Wednesday and Friday using clean technique. Patient/Family/caregiver may change dressings as

## 2025-05-30 NOTE — PATIENT INSTRUCTIONS
[facility]  [address]   [place], [place] [address]  Telephone: [phone]     FAX [phone]    Discharge Instructions:  Foam border and tubigrip(give extra) keep area covered for a week.    Congratulations! You have completed your treatment program. We have outlined a list of reminders to assist you in maintaining your continues health.    Return to your primary care physician for all your health issues.   Resume your ordinary activities as prescribed.  Take your medications as prescribed by your doctor.  Check your skin daily for cracks, bruises, sores, or dryness.  Clean and dry your skin thoroughly.  Avoid alcohol. Quit smoking if applicable.  Maintain a nutritious diet; take a multivitamin daily.  Avoid pressure on the wound site. Keep your legs elevated above the level of your heart whenever possible.  Continue to use wraps/stockings/compresion if prescribed/  Replace compression hose/stockings every 6 months to insure proper fit.  Wear well fitting shoes.    Call the Wound Care Center if your wound reopens or you develop new wounds or if you have any other questions about follow up care [phone]

## 2025-06-02 ENCOUNTER — HOSPITAL ENCOUNTER (OUTPATIENT)
Dept: WOUND CARE | Age: 75
Discharge: HOME | End: 2025-06-02
Payer: MEDICARE

## 2025-06-02 VITALS — HEART RATE: 73 BPM | RESPIRATION RATE: 16 BRPM | SYSTOLIC BLOOD PRESSURE: 135 MMHG | DIASTOLIC BLOOD PRESSURE: 86 MMHG

## 2025-06-02 DIAGNOSIS — T81.89XA WOUND, SURGICAL, NONHEALING, INITIAL ENCOUNTER: Primary | ICD-10-CM

## 2025-06-02 DIAGNOSIS — L97.923: ICD-10-CM

## 2025-06-02 DIAGNOSIS — E66.811 CLASS 1 OBESITY DUE TO EXCESS CALORIES WITH SERIOUS COMORBIDITY AND BODY MASS INDEX (BMI) OF 30.0 TO 30.9 IN ADULT: ICD-10-CM

## 2025-06-02 DIAGNOSIS — E66.09 CLASS 1 OBESITY DUE TO EXCESS CALORIES WITH SERIOUS COMORBIDITY AND BODY MASS INDEX (BMI) OF 30.0 TO 30.9 IN ADULT: ICD-10-CM

## 2025-06-02 PROCEDURE — 99213 OFFICE O/P EST LOW 20 MIN: CPT | Performed by: EMERGENCY MEDICINE

## 2025-06-02 PROCEDURE — 99212 OFFICE O/P EST SF 10 MIN: CPT

## 2025-06-02 RX ORDER — LIDOCAINE 50 MG/G
OINTMENT TOPICAL PRN
OUTPATIENT
Start: 2025-06-02

## 2025-06-02 RX ORDER — BACITRACIN ZINC AND POLYMYXIN B SULFATE 500; 1000 [USP'U]/G; [USP'U]/G
OINTMENT TOPICAL PRN
OUTPATIENT
Start: 2025-06-02

## 2025-06-02 RX ORDER — LIDOCAINE 40 MG/G
CREAM TOPICAL PRN
Status: DISCONTINUED | OUTPATIENT
Start: 2025-06-02 | End: 2025-06-03 | Stop reason: HOSPADM

## 2025-06-02 RX ORDER — BETAMETHASONE DIPROPIONATE 0.5 MG/G
CREAM TOPICAL PRN
OUTPATIENT
Start: 2025-06-02

## 2025-06-02 RX ORDER — CLOBETASOL PROPIONATE 0.5 MG/G
OINTMENT TOPICAL PRN
OUTPATIENT
Start: 2025-06-02

## 2025-06-02 RX ORDER — SODIUM CHLOR/HYPOCHLOROUS ACID 0.033 %
SOLUTION, IRRIGATION IRRIGATION PRN
OUTPATIENT
Start: 2025-06-02

## 2025-06-02 RX ORDER — LIDOCAINE 40 MG/G
CREAM TOPICAL PRN
OUTPATIENT
Start: 2025-06-02

## 2025-06-02 RX ORDER — LIDOCAINE HYDROCHLORIDE 40 MG/ML
SOLUTION TOPICAL PRN
OUTPATIENT
Start: 2025-06-02

## 2025-06-02 RX ORDER — NEOMYCIN/BACITRACIN/POLYMYXINB 3.5-400-5K
OINTMENT (GRAM) TOPICAL PRN
OUTPATIENT
Start: 2025-06-02

## 2025-06-02 RX ORDER — GENTAMICIN SULFATE 1 MG/G
OINTMENT TOPICAL PRN
OUTPATIENT
Start: 2025-06-02

## 2025-06-02 RX ORDER — MUPIROCIN 20 MG/G
OINTMENT TOPICAL PRN
OUTPATIENT
Start: 2025-06-02

## 2025-06-02 RX ORDER — SILVER SULFADIAZINE 10 MG/G
CREAM TOPICAL PRN
OUTPATIENT
Start: 2025-06-02

## 2025-06-02 RX ORDER — LIDOCAINE HYDROCHLORIDE 20 MG/ML
JELLY TOPICAL PRN
OUTPATIENT
Start: 2025-06-02

## 2025-06-02 RX ORDER — GINSENG 100 MG
CAPSULE ORAL PRN
OUTPATIENT
Start: 2025-06-02

## 2025-06-02 RX ORDER — TRIAMCINOLONE ACETONIDE 1 MG/G
OINTMENT TOPICAL PRN
OUTPATIENT
Start: 2025-06-02

## 2025-06-02 NOTE — PROGRESS NOTES
[facility]     Note Type: Medical Staff Progress Note    Referring Provider: Edd Lorenzo MD  Reason for Referral: Left lower leg patient presents    Tye Resendez  MEDICAL RECORD NUMBER:  [id number]  AGE: 74 y.o.   GENDER: male  : 1950  EPISODE DATE:  2025    Chief complaint and reason for visit:     Chief Complaint   Patient presents with    Wound Check     Left lower leg        HPI/Wound Narrative:     Per NP originally: Tye Resendez is a 74 y.o. male who presents today for an evaluation of a wound/ulcer. Wound duration:  5 month(s).  Patient presents for returning wound care visit referred by Dr. Martinez for left lower leg nonhealing surgical wound since 10/6/2024.  Patient lives in assisted living at [facility] with his wife.  History of chronic kidney disease stage IV, schizophrenia, hypertension, and hyperlipidemia.  Patient unsure of current treatment to left lower extremity.  Patient denies pain, fever, chills and/or nausea/vomiting.  Wound/Ulcer Pain Timing/Severity: none  Quality of pain: N/A  Severity:  0 / 10   Modifying Factors: None  Associated Signs/Symptoms: none    25: no new wound care issues    2025: No issues or concerns reported    2025: Patient states his dressing has not been changed in over a week.  Wound appearance improved with smaller wound measurements.  Tendon minimally exposed.  Continue current treatment.   to call [facility] to discuss dressing change orders.    2025: Patient last seen 2/10/2025.  Patient still residing at [facility].  Wound appearance improved with smaller measurements and minimal exposure of tendon.  Treatment changed to collagen with silicone border.  Edema management with Tubigrip.  Follow-up in 2 weeks.    2/10/2025: X-ray reviewed.  No osteomyelitis noted.  Patient states home care did not start care and dressing has been left in place since

## 2025-06-02 NOTE — PLAN OF CARE
Patient's wound is healed.  Discharge instructions given.  Patient verbalized understanding.

## 2025-06-02 NOTE — PLAN OF CARE
[facility]: [phone], [phone]    Patient Instructions     [facility]  [address]   [place], [place] [address]  Telephone: [phone]     FAX [phone]    Discharge Instructions:  Foam border and tubigrip(give extra) keep area covered for a week.    Congratulations! You have completed your treatment program. We have outlined a list of reminders to assist you in maintaining your continues health.    Return to your primary care physician for all your health issues.   Resume your ordinary activities as prescribed.  Take your medications as prescribed by your doctor.  Check your skin daily for cracks, bruises, sores, or dryness.  Clean and dry your skin thoroughly.  Avoid alcohol. Quit smoking if applicable.  Maintain a nutritious diet; take a multivitamin daily.  Avoid pressure on the wound site. Keep your legs elevated above the level of your heart whenever possible.  Continue to use wraps/stockings/compresion if prescribed/  Replace compression hose/stockings every 6 months to insure proper fit.  Wear well fitting shoes.    Call the Wound Care Center if your wound reopens or you develop new wounds or if you have any other questions about follow up care [phone]      WOUNDS REQUIRING DRESSING Changes:               Patient seen and treated on 6/2/2025    By Dr Murray NPI: [id number]   (provider/NPI)

## (undated) DEVICE — SYRINGE MED 30ML SLIP TIP BLNT FILL AND LUERLOCK DISP

## (undated) DEVICE — SUTURE PDS + SZ 2 0 L27IN ABSRB VLT L36MM CT 1 1 2 CIR PDP339H

## (undated) DEVICE — BANDAGE COBAN 6 IN WND 6INX5YD FOAM

## (undated) DEVICE — GOWN,SIRUS,POLYRNF,BRTHSLV,XL,30/CS: Brand: MEDLINE

## (undated) DEVICE — CYSTO: Brand: MEDLINE INDUSTRIES, INC.

## (undated) DEVICE — LOWER EXTREMITY: Brand: MEDLINE INDUSTRIES, INC.

## (undated) DEVICE — BAG DRNGE 4000ML CONT IRRIG ROUNDED TEARDROP SHP DISP

## (undated) DEVICE — BAG DRAINAGE NS

## (undated) DEVICE — BANDAGE,GAUZE,BULKEE II,4.5"X4.1YD,STRL: Brand: MEDLINE

## (undated) DEVICE — STERILE LATEX POWDER-FREE SURGICAL GLOVESWITH NITRILE COATING: Brand: PROTEXIS

## (undated) DEVICE — COTTON UNDERCAST PADDING,CRIMPED FINISH: Brand: WEBRIL

## (undated) DEVICE — 19 IN KNEE IMMOBILIZER: Brand: DEROYAL

## (undated) DEVICE — SYRINGE, LUER LOCK, 10ML: Brand: MEDLINE

## (undated) DEVICE — GLOVE 8 LTX ST TRIFLEX POWDER LK

## (undated) DEVICE — PAD,ABDOMINAL,8"X10",ST,LF: Brand: MEDLINE

## (undated) DEVICE — DRESSING,GAUZE,XEROFORM,CURAD,1"X8",ST: Brand: CURAD

## (undated) DEVICE — SOLUTION IRRIG 3000ML 1.5% GL USP UROMATIC CONT

## (undated) DEVICE — BLADDER EVACUATOR: Brand: UROVAC BLADDER EVACUATOR

## (undated) DEVICE — APPLICATOR MEDICATED 26 CC SOLUTION HI LT ORNG CHLORAPREP

## (undated) DEVICE — STERILE POLYISOPRENE POWDER-FREE SURGICAL GLOVES: Brand: PROTEXIS

## (undated) DEVICE — SUTURE ETHILON SZ 3-0 L18IN NONABSORBABLE BLK PS-2 L19MM 3/8 1669H

## (undated) DEVICE — CATHETER URETH 24FR BLLN 30CC STD LTX 3 W TWO OPP DRNGE EYE

## (undated) DEVICE — SPONGE LAP W18XL18IN WHT COT 4 PLY FLD STRUNG RADPQ DISP ST 2 PER PACK

## (undated) DEVICE — HANDPIECE SET WITH HIGH FLOW TIP AND SUCTION TUBE: Brand: INTERPULSE

## (undated) DEVICE — STOCKINETTE,IMPERVIOUS,12X48,STERILE: Brand: MEDLINE

## (undated) DEVICE — SUTURE PDS + SZ 0 L36IN ABSRB VLT CT 1 L36MM 1 2 CIR PDP346H

## (undated) DEVICE — SOLUTION IRRIG 1000ML STRL H2O USP PLAS POUR BTL